# Patient Record
Sex: MALE | Race: WHITE | Employment: FULL TIME | URBAN - METROPOLITAN AREA
[De-identification: names, ages, dates, MRNs, and addresses within clinical notes are randomized per-mention and may not be internally consistent; named-entity substitution may affect disease eponyms.]

---

## 2017-01-09 ENCOUNTER — RADIANT APPOINTMENT (OUTPATIENT)
Dept: ULTRASOUND IMAGING | Facility: CLINIC | Age: 62
End: 2017-01-09
Attending: PREVENTIVE MEDICINE
Payer: COMMERCIAL

## 2017-01-09 DIAGNOSIS — I71.9 AORTIC ANEURYSM OF UNSPECIFIED SITE, WITHOUT RUPTURE (H): ICD-10-CM

## 2017-01-09 PROCEDURE — 76775 US EXAM ABDO BACK WALL LIM: CPT

## 2017-01-10 ENCOUNTER — OFFICE VISIT (OUTPATIENT)
Dept: FAMILY MEDICINE | Facility: CLINIC | Age: 62
End: 2017-01-10
Payer: COMMERCIAL

## 2017-01-10 VITALS
SYSTOLIC BLOOD PRESSURE: 128 MMHG | BODY MASS INDEX: 34.36 KG/M2 | DIASTOLIC BLOOD PRESSURE: 82 MMHG | OXYGEN SATURATION: 97 % | HEART RATE: 68 BPM | WEIGHT: 240 LBS | HEIGHT: 70 IN

## 2017-01-10 DIAGNOSIS — I10 BENIGN ESSENTIAL HYPERTENSION: Primary | ICD-10-CM

## 2017-01-10 DIAGNOSIS — M25.552 HIP PAIN, LEFT: ICD-10-CM

## 2017-01-10 DIAGNOSIS — N52.8 OTHER MALE ERECTILE DYSFUNCTION: ICD-10-CM

## 2017-01-10 DIAGNOSIS — Z87.898 H/O MOTION SICKNESS: ICD-10-CM

## 2017-01-10 PROCEDURE — 99213 OFFICE O/P EST LOW 20 MIN: CPT | Performed by: PHYSICIAN ASSISTANT

## 2017-01-10 RX ORDER — SCOLOPAMINE TRANSDERMAL SYSTEM 1 MG/1
PATCH, EXTENDED RELEASE TRANSDERMAL
Qty: 10 PATCH | Refills: 1 | Status: SHIPPED
Start: 2017-01-10 | End: 2017-03-28

## 2017-01-10 RX ORDER — AMLODIPINE AND BENAZEPRIL HYDROCHLORIDE 10; 40 MG/1; MG/1
1 CAPSULE ORAL DAILY
Qty: 90 CAPSULE | Refills: 1 | Status: SHIPPED | OUTPATIENT
Start: 2017-01-10 | End: 2017-06-09

## 2017-01-10 RX ORDER — TADALAFIL 20 MG/1
10-20 TABLET ORAL DAILY PRN
Qty: 6 TABLET | Refills: 11 | Status: SHIPPED | OUTPATIENT
Start: 2017-01-10 | End: 2017-03-28

## 2017-01-10 RX ORDER — TRAMADOL HYDROCHLORIDE 50 MG/1
50-100 TABLET ORAL EVERY 6 HOURS PRN
Qty: 30 TABLET | Refills: 0 | Status: ON HOLD | OUTPATIENT
Start: 2017-01-10 | End: 2017-04-19

## 2017-01-10 NOTE — MR AVS SNAPSHOT
After Visit Summary   1/10/2017    Santos Rajan    MRN: [id number]           Patient Information     Date Of Birth          1955        Visit Information        Provider Department      1/10/2017 8:00 AM Roly Tomas PA-C [facility]        Today's Diagnoses     Benign essential hypertension    -  1     Other male erectile dysfunction         Hip pain, left         H/O motion sickness            Follow-ups after your visit        Your next 10 appointments already scheduled     Jan 13, 2017  3:15 PM   XR LUMBAR EPIDURAL INJECTION with MGXR3, MG NEURO RAD   [facility] ([facility]    [address] [address] [place] [id number] [phone]           For nerve root injection, please send or bring copies of any MRIs or other scans you have had.  Bring a list of your current medicines to your exam. (Include vitamins, minerals and over-the-counter medicines.) Leave your valuables at home.  Plan to have someone drive you home afterward.  Stop taking the following medicines (but talk to your doctor first):   If you take blood thinners, you may need to stop taking them a few days before treatment. Talk to your doctor before stopping these medicines.Stop taking Coumadin (warfarin) 3 days before treatment. Restart the day after treatment.   If you take Plavix, Ticlid, Pletal or Persantine, please ask your doctor if you should stop these medicines. You may need extra tests on the morning of your scan. You may take your other medicines as normal.  Stop all food and drink (including water) 3 hours before your test or treatment.  Please tell the doctor:   If you are allergic to X-ray dye (contrast fluid).   If you may be pregnant.  Injections take about 30 to 45 minutes. Most people spend up to 2 hours in the clinic or hospital.  Please call the Imaging Department at your exam site with any questions              Who to contact     Normal or  "non-critical lab and imaging results will be communicated to you by MyChart, letter or phone within 4 business days after the clinic has received the results. If you do not hear from us within 7 days, please contact the clinic through [employer] or phone. If you have a critical or abnormal lab result, we will notify you by phone as soon as possible.  Submit refill requests through [employer] or call your pharmacy and they will forward the refill request to us. Please allow 3 business days for your refill to be completed.          If you need to speak with a  for additional information , please call: [phone]             Additional Information About Your Visit        [employer] Information     [employer] gives you secure access to your electronic health record. If you see a primary care provider, you can also send messages to your care team and make appointments. If you have questions, please call your primary care clinic.  If you do not have a primary care provider, please call [phone] and they will assist you.        Care EveryWhere ID     This is your Care EveryWhere ID. This could be used by other organizations to access your [facility] medical records  [id number]        Your Vitals Were     Pulse Height BMI (Body Mass Index) Pulse Oximetry          68 5' 10\" (1.778 m) 34.44 kg/m2 97%         Blood Pressure from Last 3 Encounters:   01/10/17 128/82   12/30/16 160/94   12/22/16 155/91    Weight from Last 3 Encounters:   01/10/17 240 lb (108.863 kg)   12/19/16 240 lb 3.2 oz (108.954 kg)   11/15/16 235 lb 11.2 oz (106.913 kg)              Today, you had the following     No orders found for display         Today's Medication Changes          These changes are accurate as of: 1/10/17  9:43 AM.  If you have any questions, ask your nurse or doctor.               Start taking these medicines.        Dose/Directions    scopolamine 72 hr patch   Commonly known as:  TRANSDERM   Used for:  H/O motion sickness "   Started by:  Roly Tomas PA-C        Place 1 patch onto the skin every 72 hours.  Apply to hairless area behind one ear at least 4 hours before travel.  Remove old patch and change every 3 days.   Quantity:  10 patch   Refills:  1       tadalafil 20 MG tablet   Commonly known as:  CIALIS   Used for:  Other male erectile dysfunction   Started by:  Roly Tomas PA-C        Dose:  10-20 mg   Take 0.5-1 tablets (10-20 mg) by mouth daily as needed for erectile dysfunction Never use with nitroglycerin, terazosin or doxazosin.   Quantity:  6 tablet   Refills:  11       traMADol 50 MG tablet   Commonly known as:  ULTRAM   Used for:  Hip pain, left   Started by:  Roly Tomas PA-C        Dose:   mg   Take 1-2 tablets ( mg) by mouth every 6 hours as needed for pain   Quantity:  30 tablet   Refills:  0         These medicines have changed or have updated prescriptions.        Dose/Directions    tiZANidine 4 MG tablet   Commonly known as:  ZANAFLEX   This may have changed:  Another medication with the same name was removed. Continue taking this medication, and follow the directions you see here.   Used for:  Bursitis of left hip, Lumbar herniated disc   Changed by:  Nico Alva MD        Dose:  4-8 mg   Take 1-2 tablets (4-8 mg) by mouth nightly as needed for muscle spasms   Quantity:  30 tablet   Refills:  1         Stop taking these medicines if you haven't already. Please contact your care team if you have questions.     ADVIL PO   Stopped by:  Roly Tomas PA-C           sildenafil 20 MG tablet   Commonly known as:  REVATIO/VIAGRA   Stopped by:  Roly Tomas PA-C                Where to get your medicines      These medications were sent to [facility] - [place], [place] - [address]  AT [facility] [address]  [address] , [facility] [address]     Phone:  [phone]    - amLODIPine-benazepril 10-40 MG per capsule  - scopolamine 72 hr  patch  - tadalafil 20 MG tablet      Some of these will need a paper prescription and others can be bought over the counter.  Ask your nurse if you have questions.     Bring a paper prescription for each of these medications    - traMADol 50 MG tablet             Primary Care Provider Office Phone # Fax #    Roly Tomas PA-C [phone]       [facility] [address]  [facility] [address]        Thank you!     Thank you for choosing [facility]  for your care. Our goal is always to provide you with excellent care. Hearing back from our patients is one way we can continue to improve our services. Please take a few minutes to complete the written survey that you may receive in the mail after your visit with us. Thank you!             Your Updated Medication List - Protect others around you: Learn how to safely use, store and throw away your medicines at www.disposemymeds.org.          This list is accurate as of: 1/10/17  9:43 AM.  Always use your most recent med list.                   Brand Name Dispense Instructions for use    amLODIPine-benazepril 10-40 MG per capsule    LOTREL    90 capsule    Take 1 capsule by mouth daily       naproxen 500 MG tablet    NAPROSYN    40 tablet    Take 1 tablet (500 mg) by mouth 2 times daily (with meals)       nicotine 21 MG/24HR 24 hr patch    NICODERM CQ    15 patch    Place 1 patch onto the skin every 24 hours       scopolamine 72 hr patch    TRANSDERM    10 patch    Place 1 patch onto the skin every 72 hours.  Apply to hairless area behind one ear at least 4 hours before travel.  Remove old patch and change every 3 days.       tadalafil 20 MG tablet    CIALIS    6 tablet    Take 0.5-1 tablets (10-20 mg) by mouth daily as needed for erectile dysfunction Never use with nitroglycerin, terazosin or doxazosin.       tiZANidine 4 MG tablet    ZANAFLEX    30 tablet    Take 1-2 tablets (4-8 mg) by mouth nightly as needed for muscle spasms        traMADol 50 MG tablet    ULTRAM    30 tablet    Take 1-2 tablets ( mg) by mouth every 6 hours as needed for pain

## 2017-01-10 NOTE — PROGRESS NOTES
SUBJECTIVE:                                                    Santos Rajan is a 61 year old male who presents to clinic today for the following health issues:      Hypertension Follow-up      Outpatient blood pressures are not being checked.    Low Salt Diet: not monitoring salt     Erectile dysfunction-tried viagra without success. Would like to discuss other medication options      Amount of exercise or physical activity: goes for 1 mile walks daily    Problems taking medications regularly: No    Medication side effects: none    Diet: regular (no restrictions)        Problem list and histories reviewed & adjusted, as indicated.  Additional history: as documented    Problem list, Medication list, Allergies, and Medical/Social/Surgical histories reviewed in [facility] and updated as appropriate.    Patient Active Problem List   Diagnosis     CARDIOVASCULAR SCREENING; LDL GOAL LESS THAN 160     Impaired fasting glucose     Low testosterone     Fatty liver     Umbilical hernia     Colon polyps     Advanced directives, counseling/discussion     Benign essential hypertension     Diverticulosis of large intestine without hemorrhage     Social History   Substance Use Topics     Smoking status: Current Some Day Smoker -- 0.50 packs/day for 40 years     Start date: 03/01/1973     Last Attempt to Quit: 08/01/2015     Smokeless tobacco: Never Used      Comment: Quiting as of 7/1/15      Alcohol Use: 0.0 oz/week     0 Standard drinks or equivalent per week      Comment: minimal     All other systems negative except as outline above  OBJECTIVE:    Eye exam - right eye normal lid, conjunctiva, cornea, pupil and fundus, left eye normal lid, conjunctiva, cornea, pupil and fundus.  Thyroid not palpable, not enlarged, no nodules detected.  CHEST:chest clear to IPPA, no tachypnea, retractions or cyanosis and S1, S2 normal, no murmur, no gallop, rate regular.  Pulses normal.    Santos was seen today for hypertension and erectile  dysfunction.    Diagnoses and all orders for this visit:    Benign essential hypertension  -     amLODIPine-benazepril (LOTREL) 10-40 MG per capsule; Take 1 capsule by mouth daily      work on lifestyle modification  Recheck in 6mos

## 2017-01-13 ENCOUNTER — RADIANT APPOINTMENT (OUTPATIENT)
Dept: GENERAL RADIOLOGY | Facility: CLINIC | Age: 62
End: 2017-01-13
Attending: PREVENTIVE MEDICINE
Payer: COMMERCIAL

## 2017-01-13 VITALS — DIASTOLIC BLOOD PRESSURE: 86 MMHG | OXYGEN SATURATION: 99 % | HEART RATE: 70 BPM | SYSTOLIC BLOOD PRESSURE: 159 MMHG

## 2017-01-13 DIAGNOSIS — M51.16 LUMBAR DISC HERNIATION WITH RADICULOPATHY: ICD-10-CM

## 2017-01-13 DIAGNOSIS — M51.369 LUMBAR DEGENERATIVE DISC DISEASE: ICD-10-CM

## 2017-01-13 PROCEDURE — 62323 NJX INTERLAMINAR LMBR/SAC: CPT | Performed by: RADIOLOGY

## 2017-01-13 RX ORDER — LIDOCAINE HYDROCHLORIDE 10 MG/ML
5 INJECTION, SOLUTION EPIDURAL; INFILTRATION; INTRACAUDAL; PERINEURAL ONCE
Status: COMPLETED | OUTPATIENT
Start: 2017-01-13 | End: 2017-01-13

## 2017-01-13 RX ORDER — BUPIVACAINE HYDROCHLORIDE 5 MG/ML
10 INJECTION, SOLUTION PERINEURAL ONCE
Status: COMPLETED | OUTPATIENT
Start: 2017-01-13 | End: 2017-01-13

## 2017-01-13 RX ORDER — IOPAMIDOL 408 MG/ML
10 INJECTION, SOLUTION INTRATHECAL ONCE
Status: COMPLETED | OUTPATIENT
Start: 2017-01-13 | End: 2017-01-13

## 2017-01-13 RX ORDER — METHYLPREDNISOLONE ACETATE 80 MG/ML
80 INJECTION, SUSPENSION INTRA-ARTICULAR; INTRALESIONAL; INTRAMUSCULAR; SOFT TISSUE ONCE
Status: COMPLETED | OUTPATIENT
Start: 2017-01-13 | End: 2017-01-13

## 2017-01-13 RX ADMIN — LIDOCAINE HYDROCHLORIDE 50 MG: 10 INJECTION, SOLUTION EPIDURAL; INFILTRATION; INTRACAUDAL; PERINEURAL at 15:53

## 2017-01-13 RX ADMIN — METHYLPREDNISOLONE ACETATE 80 MG: 80 INJECTION, SUSPENSION INTRA-ARTICULAR; INTRALESIONAL; INTRAMUSCULAR; SOFT TISSUE at 15:54

## 2017-01-13 RX ADMIN — BUPIVACAINE HYDROCHLORIDE 10 ML: 5 INJECTION, SOLUTION PERINEURAL at 15:51

## 2017-01-13 RX ADMIN — IOPAMIDOL 2 ML: 408 INJECTION, SOLUTION INTRATHECAL at 15:53

## 2017-01-13 NOTE — PROGRESS NOTES
: Santos was seen in X-ray today for a lumbar epidural injection. Patient rated pain before procedure 5/10. After procedure patient rated pain 4/10. This pain level is acceptable to patient. Patient discharged home with his .

## 2017-01-23 ENCOUNTER — MYC MEDICAL ADVICE (OUTPATIENT)
Dept: ORTHOPEDICS | Facility: CLINIC | Age: 62
End: 2017-01-23

## 2017-01-24 NOTE — TELEPHONE ENCOUNTER
Spoke with Dr. Alva on 1/23/17 and he is OK for left hip MRI. Orders placed and signed and patient was contacted that he may schedule at earliest convenience.

## 2017-01-26 ENCOUNTER — RADIANT APPOINTMENT (OUTPATIENT)
Dept: MRI IMAGING | Facility: CLINIC | Age: 62
End: 2017-01-26
Attending: PREVENTIVE MEDICINE
Payer: COMMERCIAL

## 2017-01-26 DIAGNOSIS — M25.552 LEFT HIP PAIN: ICD-10-CM

## 2017-01-26 PROCEDURE — 73721 MRI JNT OF LWR EXTRE W/O DYE: CPT | Mod: TC

## 2017-01-30 ENCOUNTER — OFFICE VISIT (OUTPATIENT)
Dept: ORTHOPEDICS | Facility: CLINIC | Age: 62
End: 2017-01-30
Payer: COMMERCIAL

## 2017-01-30 VITALS — HEART RATE: 71 BPM | DIASTOLIC BLOOD PRESSURE: 79 MMHG | SYSTOLIC BLOOD PRESSURE: 127 MMHG

## 2017-01-30 DIAGNOSIS — M51.16 LUMBAR DISC HERNIATION WITH RADICULOPATHY: ICD-10-CM

## 2017-01-30 DIAGNOSIS — M51.9 LUMBAR DISC DISORDER: Primary | ICD-10-CM

## 2017-01-30 PROCEDURE — 99213 OFFICE O/P EST LOW 20 MIN: CPT | Performed by: PREVENTIVE MEDICINE

## 2017-01-30 RX ORDER — NAPROXEN 500 MG/1
500 TABLET ORAL 2 TIMES DAILY WITH MEALS
Qty: 40 TABLET | Refills: 1 | Status: ON HOLD | OUTPATIENT
Start: 2017-01-30 | End: 2017-03-21

## 2017-01-30 RX ORDER — GABAPENTIN 100 MG/1
100 CAPSULE ORAL 3 TIMES DAILY
Qty: 60 CAPSULE | Refills: 0 | Status: SHIPPED | OUTPATIENT
Start: 2017-01-30 | End: 2017-02-23

## 2017-01-30 ASSESSMENT — PAIN SCALES - GENERAL: PAINLEVEL: SEVERE PAIN (6)

## 2017-01-30 NOTE — PATIENT INSTRUCTIONS
Thanks for coming today.  [facility] Medicine Clinic  [address] [place] [address]    To schedule future appointments in Ortho Clinic, you may call [phone].    To schedule ordered imaging by your Provider: Call [facility] at [phone]    [email or web address] available online at:   [email or web address]    Please call if any further questions or concerns [phone] and ask for the Orthopedic Department. Clinic hours 8 am to 5 pm.    Return to clinic if symptoms worsen.

## 2017-01-30 NOTE — PROGRESS NOTES
HISTORY OF PRESENT ILLNESS  Santos returns for left low back and hip pain. He would like to go over his left hip MRI  He continues to have pain and uses advil and tizanadine for this. He had an SI joint and left ANAY about 2 weeks ago injected, he has less pain in his low back but still gets pain when he walks a lot that goes into his hip and left leg  Location: left hip and low back  Quality:  sharp    Severity:   8  Of 10 at worst and   1  of 10 at best    Duration: back: years, hip: months  Timing: with activity and rolling over in bed  Context: occurs while walking and standing a lot  Associated symptoms: chronic low back pain and radicular symptoms into his bilateral thighs  Additional history: as documented  Problem list, Medication list, Allergies, and Medical/Social/Surgical histories reviewed in [facility] and updated as appropriate.     REVIEW OF SYSTEMS 1/30  Constitutional: negative for fever, chills, change in weight  Skin: negative for worrisome rashes, moles or lesions  Eyes: negative for vision changes or irritation  GI: negative for nausea, abdominal pain, heartburn, or change in bowel habits   MSK: negative for other joint problems currently, refer to [facility]     PHYSICAL EXAM  Vital Signs: /79 mmHg  Pulse 71 Patient declined being weighed. There is no weight on file to calculate BMI.    General  - normal appearance, in no obvious distress  CV  - normal femoral pulse  Pulm  - normal respiratory pattern, non-labored  Musculoskeletal - left hip and lumbar spine  - stance: gait today shows no limp, no obvious leg length discrepancy, normal heel and toe walk  - inspection: no swelling or effusion,  normal bone and joint alignment, no obvious deformity  - palpation: no lateral or anterior hip tenderness, left lateral hip TTP over bursa  - ROM:  Today, NOT hip limited flexion and internal rotation secondary to pain, no pain with passive and active ROM   - strength: 5/5 in all planes  - special tests:  (-)  HANNAH  (+) FADIR on left hip- worse today  no pain with axial femoral load  Neuro  - no sensory or motor deficit, grossly normal coordination, normal muscle tone  Skin  - no ecchymosis, erythema, warmth, or induration, no obvious rash  Psych  - interactive, appropriate, normal mood and affect  Lumbar spine: has NO pain with full extension of back, negative SLR, this pain is located over his left SI joint  Overall improved     ASSESSMENT    Mr. Rajan is a 61 year old year old male who is in the office today for : left hip pain due to lumbar radicular pain, lumbar DDD, improved, not resolved      PLAN  I ordered PT for hip  Start gabapentin as well - night time with slowly adding day time dose.    Santos had all of their questions answered and understand to refrain from activities that are not tolerable    Consider A nerve root specific lumbar injection in the future as well   He should follow up sooner if the condition worsens or if other problems arise.  It was a pleasure taking care of Santos.    Dr. Nico Alva

## 2017-01-30 NOTE — NURSING NOTE
"Santos Rajan's goals for this visit include:Follow up after left hip MRI.   He requests these members of his care team be copied on today's visit information: no    PCP: Roly Tomas    Referring Provider:  Referred Self, MD  No address on file    Chief Complaint   Patient presents with     RECHECK     Follow up for left hip following MRI. No change in symptoms.        Initial /79 mmHg  Pulse 71 Estimated body mass index is 34.44 kg/(m^2) as calculated from the following:    Height as of 1/10/17: 1.778 m (5' 10\").    Weight as of 1/10/17: 108.863 kg (240 lb).  BP completed using cuff size: regular    "

## 2017-02-01 ENCOUNTER — THERAPY VISIT (OUTPATIENT)
Dept: PHYSICAL THERAPY | Facility: CLINIC | Age: 62
End: 2017-02-01
Payer: COMMERCIAL

## 2017-02-01 DIAGNOSIS — M54.42 CHRONIC BILATERAL LOW BACK PAIN WITH LEFT-SIDED SCIATICA: Primary | ICD-10-CM

## 2017-02-01 DIAGNOSIS — G89.29 CHRONIC BILATERAL LOW BACK PAIN WITH LEFT-SIDED SCIATICA: Primary | ICD-10-CM

## 2017-02-01 DIAGNOSIS — M25.552 HIP PAIN, LEFT: ICD-10-CM

## 2017-02-01 PROCEDURE — 97161 PT EVAL LOW COMPLEX 20 MIN: CPT | Mod: GP | Performed by: PHYSICAL THERAPIST

## 2017-02-01 PROCEDURE — 97110 THERAPEUTIC EXERCISES: CPT | Mod: GP | Performed by: PHYSICAL THERAPIST

## 2017-02-01 NOTE — Clinical Note
[facility] ATHLETIC [facility] PHYSICAL [facility]  [address]  [facility] [id number] [phone]    2017    Re: Santos Rajan   :   1955  MRN:  [id number]   REFERRING PHYSICIAN:   Nico Alva    [facility]    Date of Initial Evaluation:  ***  Visits:  Rxs Used: 1  Reason for Referral:     Chronic bilateral low back pain with left-sided sciatica  Hip pain, left    EVALUATION SUMMARY    [facility] Initial Evaluation      Subjective:    Santos Rajan is a 61 year old male with a lumbar condition.  Condition occurred with:  Insidious onset (slowly over time getting more and more hip pain and difficulty walking).  Condition occurred: for unknown reasons.  This is a chronic condition  Back pain on and off for a while and now pain is progressing into his left leg 17 Date of MD order. .    Patient reports pain:  Lower lumbar spine.  Radiates to:  Gluteals left, thigh left and lower leg left.  Pain is described as burning and sharp and is intermittent and reported as 8/10.  Associated symptoms:  Tingling, loss of strength and loss of motion/stiffness ( tingling bilateral thighs). Pain is the same all the time.  Exacerbated by: walking about 100 yards or more, standing 40-45 min. Relieved by: sitting or lying down.   Since onset symptoms are gradually worsening.  Special tests:  X-ray and MRI.  Previous treatment: 1 injection in low back  no help, injection in left hip bursa no relief, Left hip joint  injection slight relief but then no long term relief, SI injection no relief, epidural in low back again with no relief.     General health as reported by patient is fair.  Pertinent medical history includes:  History of fractures, overweight, high blood pressure and smoking.    Other surgeries include:  Orthopedic surgery (Broken Right femur, jaw).  Current medications:  Pain medication,  muscle relaxants and high blood pressure medication.  Current occupation is Contractor     .  Patient is working in normal job without restrictions.  Primary job tasks include:  Repetitive tasks and prolonged sitting.    Barriers include:  None as reported by the patient.    Red flags:  None as reported by the patient.                      Objective:    Standing Alignment:        Lumbar:  Lordosis decr            Gait:    Gait Type:  Normal   Assistive Devices:  None                 Lumbar/SI Evaluation    Lumbar Myotomes:    T12-L3 (Hip Flex):  Left: 4+    Right: 5  L2-4 (Quads):  Left:  5    Right:  5  L4 (Ankle DF):  Left:  5    Right:  5  L5 (Great Toe Ext): Left: 5    Right: 5   S1 (Toe Raise):  Left: 5    Right: 5      Lumbar Dermtomes:  Lumbar dermatomes: L3 hyposensation from scaring from femur fracture.        L3 Left:  Normal-light touch     L3 Right:  Normal-light touch  L4 Left:  Normal-light touch       L4 Right:  Normal-light touch  L5 Left:  Normal-light touch     L5 Right:  Normal-light touch  S1 Left:  Normal-light touch     S1 Right:  Normal-light touch  Neural Tension/Mobility:    Left side:  Slump positive.  Left side:SLR  negative.     Right side:   Slump or SLR  negative.   Lumbar Palpation:  normal        Lumbar Provocation:      Left negative with:  Mobility and PROM hip    Right negative with:  Mobility and PROM hip  Spinal Segmental Conclusions:     Level: Hypo noted at L1, L2, L3, L5 and L4                                        Hip Evaluation  HIP AROM:  AROM:   Left Hip:     Normal (slight end range abduction pain. )    Right Hip:                      Hip Strength:  : Left hip abduction 3+/5, ER 5/5, extension 4/5, flexion 4+/5.                          Hip Special Testing:      Left hip negative for the following special tests:  César or Fadir/Labrum      Hip Palpation:  Normal                      Cherry Lumbar Evaluation        Test Movements:  FIS: During: no effect   After: no effect    Repeat FIS: During: no effect  After: no effect    EIS: During: no effect  After: no effect    Repeat EIS: During: increases  After: no effect              Conclusion lumbar: no directional preference indentified, subjective consistent with stenosis.                                          ROS    Assessment/Plan:      Patient is a 61 year old male with lumbar and left side hip complaints.    Patient has the following significant findings with corresponding treatment plan.                Diagnosis 1:  Left low back pain with sciatica / hip pain  Pain -  hot/cold therapy, manual therapy, self management, education, directional preference exercise and home program  Decreased ROM/flexibility - manual therapy, therapeutic exercise, therapeutic activity and home program  Decreased joint mobility - manual therapy and therapeutic exercise  Decreased strength - therapeutic exercise, therapeutic activities and home program  Decreased function - therapeutic activities and home program  Impaired posture - neuro re-education, therapeutic activities and home program    Therapy Evaluation Codes:   1) History comprised of:   Personal factors that impact the plan of care:      Past/current experiences.    Comorbidity factors that impact the plan of care are:      None.     Medications impacting care: None.  2) Examination of Body Systems comprised of:   Body structures and functions that impact the plan of care:      Hip and Lumbar spine.   Activity limitations that impact the plan of care are:      Standing and Walking.  3) Clinical presentation characteristics are:   Stable/Uncomplicated.  4) Decision-Making    Moderate complexity using standardized patient assessment instrument and/or measureable assessment of functional outcome.  Cumulative Therapy Evaluation is: Low complexity.    Previous and current functional limitations:  (See Goal Flow Sheet for this information)    Short term and Long term goals: (See  Goal Flow Sheet for this information)     Communication ability:  Patient appears to be able to clearly communicate and understand verbal and written communication and follow directions correctly.  Treatment Explanation - The following has been discussed with the patient:   RX ordered/plan of care  Anticipated outcomes  Possible risks and side effects  This patient would benefit from PT intervention to resume normal activities.   Rehab potential is good.    Frequency:  1 X week, once daily  Duration:  for 8 weeks  Discharge Plan:  Achieve all LTG.  Independent in home treatment program.  Reach maximal therapeutic benefit.      Thank you for your referral.    INQUIRIES  Therapist:    [facility]  [address] [place] [address] [address]  [facility] [address]  Phone: [phone]  Fax: [phone]

## 2017-02-01 NOTE — PROGRESS NOTES
[facility] Initial Evaluation      Subjective:    Santos Rajan is a 61 year old male with a lumbar condition.  Condition occurred with:  Insidious onset (slowly over time getting more and more hip pain and difficulty walking).  Condition occurred: for unknown reasons.  This is a chronic condition  Back pain on and off for a while and now pain is progressing into his left leg 1/30/17 Date of MD order. .    Patient reports pain:  Lower lumbar spine.  Radiates to:  Gluteals left, thigh left and lower leg left.  Pain is described as burning and sharp and is intermittent and reported as 8/10.  Associated symptoms:  Tingling, loss of strength and loss of motion/stiffness ( tingling bilateral thighs). Pain is the same all the time.  Exacerbated by: walking about 100 yards or more, standing 40-45 min. Relieved by: sitting or lying down.   Since onset symptoms are gradually worsening.  Special tests:  X-ray and MRI.  Previous treatment: 1 injection in low back  no help, injection in left hip bursa no relief, Left hip joint  injection slight relief but then no long term relief, SI injection no relief, epidural in low back again with no relief.     General health as reported by patient is fair.  Pertinent medical history includes:  History of fractures, overweight, high blood pressure and smoking.    Other surgeries include:  Orthopedic surgery (Broken Right femur, jaw).  Current medications:  Pain medication, muscle relaxants and high blood pressure medication.  Current occupation is Contractor     .  Patient is working in normal job without restrictions.  Primary job tasks include:  Repetitive tasks and prolonged sitting.    Barriers include:  None as reported by the patient.    Red flags:  None as reported by the patient.                      Objective:    Standing Alignment:        Lumbar:  Lordosis decr            Gait:    Gait Type:  Normal   Assistive Devices:  None                  Lumbar/SI Evaluation    Lumbar Myotomes:    T12-L3 (Hip Flex):  Left: 4+    Right: 5  L2-4 (Quads):  Left:  5    Right:  5  L4 (Ankle DF):  Left:  5    Right:  5  L5 (Great Toe Ext): Left: 5    Right: 5   S1 (Toe Raise):  Left: 5    Right: 5      Lumbar Dermtomes:  Lumbar dermatomes: L3 hyposensation from scaring from femur fracture.        L3 Left:  Normal-light touch     L3 Right:  Normal-light touch  L4 Left:  Normal-light touch       L4 Right:  Normal-light touch  L5 Left:  Normal-light touch     L5 Right:  Normal-light touch  S1 Left:  Normal-light touch     S1 Right:  Normal-light touch  Neural Tension/Mobility:    Left side:  Slump positive.  Left side:SLR  negative.     Right side:   Slump or SLR  negative.   Lumbar Palpation:  normal        Lumbar Provocation:      Left negative with:  Mobility and PROM hip    Right negative with:  Mobility and PROM hip  Spinal Segmental Conclusions:     Level: Hypo noted at L1, L2, L3, L5 and L4                                        Hip Evaluation  HIP AROM:  AROM:   Left Hip:     Normal (slight end range abduction pain. )    Right Hip:                      Hip Strength:  : Left hip abduction 3+/5, ER 5/5, extension 4/5, flexion 4+/5.                          Hip Special Testing:      Left hip negative for the following special tests:  César or Fadir/Labrum      Hip Palpation:  Normal                      Cherry Lumbar Evaluation        Test Movements:  FIS: During: no effect  After: no effect    Repeat FIS: During: no effect  After: no effect    EIS: During: no effect  After: no effect    Repeat EIS: During: increases  After: no effect              Conclusion lumbar: no directional preference indentified, subjective consistent with stenosis.                                          ROS    Assessment/Plan:      Patient is a 61 year old male with lumbar and left side hip complaints.    Patient has the following significant findings with corresponding treatment plan.                 Diagnosis 1:  Left low back pain with sciatica / hip pain  Pain -  hot/cold therapy, manual therapy, self management, education, directional preference exercise and home program  Decreased ROM/flexibility - manual therapy, therapeutic exercise, therapeutic activity and home program  Decreased joint mobility - manual therapy and therapeutic exercise  Decreased strength - therapeutic exercise, therapeutic activities and home program  Decreased function - therapeutic activities and home program  Impaired posture - neuro re-education, therapeutic activities and home program    Therapy Evaluation Codes:   1) History comprised of:   Personal factors that impact the plan of care:      Past/current experiences.    Comorbidity factors that impact the plan of care are:      None.     Medications impacting care: None.  2) Examination of Body Systems comprised of:   Body structures and functions that impact the plan of care:      Hip and Lumbar spine.   Activity limitations that impact the plan of care are:      Standing and Walking.  3) Clinical presentation characteristics are:   Stable/Uncomplicated.  4) Decision-Making    Moderate complexity using standardized patient assessment instrument and/or measureable assessment of functional outcome.  Cumulative Therapy Evaluation is: Low complexity.    Previous and current functional limitations:  (See Goal Flow Sheet for this information)    Short term and Long term goals: (See Goal Flow Sheet for this information)     Communication ability:  Patient appears to be able to clearly communicate and understand verbal and written communication and follow directions correctly.  Treatment Explanation - The following has been discussed with the patient:   RX ordered/plan of care  Anticipated outcomes  Possible risks and side effects  This patient would benefit from PT intervention to resume normal activities.   Rehab potential is good.    Frequency:  1 X week, once  daily  Duration:  for 8 weeks  Discharge Plan:  Achieve all LTG.  Independent in home treatment program.  Reach maximal therapeutic benefit.    Please refer to the daily flowsheet for treatment today, total treatment time and time spent performing 1:1 timed codes.

## 2017-02-08 ENCOUNTER — THERAPY VISIT (OUTPATIENT)
Dept: PHYSICAL THERAPY | Facility: CLINIC | Age: 62
End: 2017-02-08
Payer: COMMERCIAL

## 2017-02-08 DIAGNOSIS — M54.42 CHRONIC BILATERAL LOW BACK PAIN WITH LEFT-SIDED SCIATICA: ICD-10-CM

## 2017-02-08 DIAGNOSIS — M25.552 HIP PAIN, LEFT: Primary | ICD-10-CM

## 2017-02-08 DIAGNOSIS — G89.29 CHRONIC BILATERAL LOW BACK PAIN WITH LEFT-SIDED SCIATICA: ICD-10-CM

## 2017-02-08 PROCEDURE — 97110 THERAPEUTIC EXERCISES: CPT | Mod: GP | Performed by: PHYSICAL THERAPIST

## 2017-02-16 ENCOUNTER — OFFICE VISIT (OUTPATIENT)
Dept: ORTHOPEDICS | Facility: CLINIC | Age: 62
End: 2017-02-16
Payer: COMMERCIAL

## 2017-02-16 VITALS — DIASTOLIC BLOOD PRESSURE: 91 MMHG | HEART RATE: 64 BPM | SYSTOLIC BLOOD PRESSURE: 148 MMHG

## 2017-02-16 DIAGNOSIS — M51.9 LUMBAR DISC DISORDER: ICD-10-CM

## 2017-02-16 DIAGNOSIS — I73.9 CLAUDICATION OF BOTH LOWER EXTREMITIES (H): Primary | ICD-10-CM

## 2017-02-16 DIAGNOSIS — I73.9 PERIPHERAL ARTERIAL DISEASE (H): ICD-10-CM

## 2017-02-16 PROCEDURE — 99214 OFFICE O/P EST MOD 30 MIN: CPT | Performed by: PREVENTIVE MEDICINE

## 2017-02-16 ASSESSMENT — PAIN SCALES - GENERAL: PAINLEVEL: MILD PAIN (3)

## 2017-02-16 NOTE — PATIENT INSTRUCTIONS
Thanks for coming today.  Ortho/Sports Medicine Clinic  [address] [place] [address]    To schedule future appointments in Ortho Clinic, you may call [phone].    To schedule ordered imaging by your Provider: Call [facility] at [phone]    Cont[employer] available online at:   [email or web address]    Please call if any further questions or concerns [phone] and ask for the Orthopedic Department. Clinic hours 8 am to 5 pm.    Return to clinic if symptoms worsen.

## 2017-02-16 NOTE — MR AVS SNAPSHOT
After Visit Summary   2/16/2017    Santos Rajan    MRN: [id number]           Patient Information     Date Of Birth          1955        Visit Information        Provider Department      2/16/2017 4:40 PM Nico Alva MD [facility]        Today's Diagnoses     Claudication of both lower extremities (H)    -  1    Peripheral arterial disease (H)        Lumbar disc disorder          Care Instructions    Thanks for coming today.  Ortho/Sports Medicine Clinic  [address] [place] [address]    To schedule future appointments in Ortho Clinic, you may call [phone].    To schedule ordered imaging by your Provider: Call [facility] at [phone]    [employer] available online at:   Network/[employer]    Please call if any further questions or concerns [phone] and ask for the Orthopedic Department. Clinic hours 8 am to 5 pm.    Return to clinic if symptoms worsen.        Follow-ups after your visit        Additional Services     VASCULAR SURGERY REFERRAL       Your provider has referred you to: [facility]: [facility] [phone]   [email or web address]    Please be aware that coverage of these services is subject to the terms and limitations of your health insurance plan.  Call member services at your health plan with any benefit or coverage questions.      Please bring the following with you to your appointment:    (1) Any X-Rays, CTs or MRIs which have been performed.  Contact the facility where they were done to arrange for  prior to your scheduled appointment.    (2) List of current medications   (3) This referral request   (4) Any documents/labs given to you for this referral                  Your next 10 appointments already scheduled     Feb 27, 2017 12:00 PM CST   SHORT with Roly Tomas PA-C   [facility] [place] [facility]    [address]  [place]  [place] [address]   [phone]            Mar 08, 2017  5:20 PM CST   TINO Spine with Ever Boles PT   [facility] ([facility] [place]  )    [address]  [place] [id number] [phone]              Who to contact     If you have questions or need follow up information about today's clinic visit or your schedule please contact [facility] directly at [phone].  Normal or non-critical lab and imaging results will be communicated to you by SMCproshart, letter or phone within 4 business days after the clinic has received the results. If you do not hear from us within 7 days, please contact the clinic through SMCproshart or phone. If you have a critical or abnormal lab result, we will notify you by phone as soon as possible.  Submit refill requests through [email or web address] or call your pharmacy and they will forward the refill request to us. Please allow 3 business days for your refill to be completed.          Additional Information About Your Visit        SMCprosharBlipify Information     [email or web address] gives you secure access to your electronic health record. If you see a primary care provider, you can also send messages to your care team and make appointments. If you have questions, please call your primary care clinic.  If you do not have a primary care provider, please call [phone] and they will assist you.      [email or web address] is an electronic gateway that provides easy, online access to your medical records. With [email or web address], you can request a clinic appointment, read your test results, renew a prescription or communicate with your care team.     To access your existing account, please contact your [facility] or call [phone] for assistance.        Care EveryWhere ID     This is your Care EveryWhere ID. This could be used by other organizations to access your [facility] medical records  [id number]        Your Vitals Were     Pulse                    64            Blood Pressure from Last 3 Encounters:   02/16/17 (!) 148/91   01/30/17 127/79   01/13/17 159/86    Weight from Last 3 Encounters:   01/10/17 108.9 kg (240 lb)   12/19/16 109 kg (240 lb 3.2 oz)   11/15/16 106.9 kg (235 lb 11.2 oz)              We Performed the Following     VASCULAR SURGERY REFERRAL          Today's Medication Changes          These changes are accurate as of: 2/16/17 11:59 PM.  If you have any questions, ask your nurse or doctor.               These medicines have changed or have updated prescriptions.        Dose/Directions    * traMADol 50 MG tablet   Commonly known as:  ULTRAM   This may have changed:  Another medication with the same name was added. Make sure you understand how and when to take each.   Used for:  Hip pain, left        Dose:   mg   Take 1-2 tablets ( mg) by mouth every 6 hours as needed for pain   Quantity:  30 tablet   Refills:  0       * traMADol 50 MG tablet   Commonly known as:  ULTRAM   This may have changed:  You were already taking a medication with the same name, and this prescription was added. Make sure you understand how and when to take each.   Used for:  Peripheral arterial disease (H), Claudication of both lower extremities (H)        Dose:   mg   Take 1-2 tablets ( mg) by mouth every 6 hours as needed for pain   Quantity:  20 tablet   Refills:  0       * Notice:  This list has 2 medication(s) that are the same as other medications prescribed for you. Read the directions carefully, and ask your doctor or other care provider to review them with you.         Where to get your medicines      These medications were sent to [facility] [address] [place], [place] - [address] [address]  AT [facility] [address]  [address] [address] , [facility] [address]     Phone:  [phone]     gabapentin 100 MG capsule         Some of these will need a paper prescription and others can be bought over the counter.  Ask your  nurse if you have questions.     Bring a paper prescription for each of these medications     traMADol 50 MG tablet                Primary Care Provider Office Phone # Fax #    Roly Tomas PA-C [phone]       [facility] [place] [facility] [place] [address]  [place] [address]        Thank you!     Thank you for choosing [facility]  for your care. Our goal is always to provide you with excellent care. Hearing back from our patients is one way we can continue to improve our services. Please take a few minutes to complete the written survey that you may receive in the mail after your visit with us. Thank you!             Your Updated Medication List - Protect others around you: Learn how to safely use, store and throw away your medicines at www.disposemymeds.org.          This list is accurate as of: 2/16/17 11:59 PM.  Always use your most recent med list.                   Brand Name Dispense Instructions for use    amLODIPine-benazepril 10-40 MG per capsule    LOTREL    90 capsule    Take 1 capsule by mouth daily       gabapentin 100 MG capsule    NEURONTIN    60 capsule    Take 1 capsule (100 mg) by mouth 3 times daily       * naproxen 500 MG tablet    NAPROSYN    40 tablet    Take 1 tablet (500 mg) by mouth 2 times daily (with meals)       * naproxen 500 MG tablet    NAPROSYN    40 tablet    Take 1 tablet (500 mg) by mouth 2 times daily (with meals)       nicotine 21 MG/24HR 24 hr patch    NICODERM CQ    15 patch    Place 1 patch onto the skin every 24 hours       scopolamine 72 hr patch    TRANSDERM    10 patch    Place 1 patch onto the skin every 72 hours.  Apply to hairless area behind one ear at least 4 hours before travel.  Remove old patch and change every 3 days.       tadalafil 20 MG tablet    CIALIS    6 tablet    Take 0.5-1 tablets (10-20 mg) by mouth daily as needed for erectile dysfunction Never use with nitroglycerin, terazosin or doxazosin.       tiZANidine 4 MG tablet     ZANAFLEX    30 tablet    Take 1-2 tablets (4-8 mg) by mouth nightly as needed for muscle spasms       * traMADol 50 MG tablet    ULTRAM    30 tablet    Take 1-2 tablets ( mg) by mouth every 6 hours as needed for pain       * traMADol 50 MG tablet    ULTRAM    20 tablet    Take 1-2 tablets ( mg) by mouth every 6 hours as needed for pain       * Notice:  This list has 4 medication(s) that are the same as other medications prescribed for you. Read the directions carefully, and ask your doctor or other care provider to review them with you.

## 2017-02-16 NOTE — PROGRESS NOTES
HISTORY OF PRESENT ILLNESS  Santos returns for left low back and hip pain that radiates to his legs moreso now with more walking. He has an aortic aneurysm that we are aware of that he has not followed up with his PCP yet for.   Location: left hip and low back  Quality:  sharp    Severity:   8  Of 10 at worst and   1  of 10 at best    Duration: back: years, hip: months  Timing: with activity and rolling over in bed  Context: occurs while walking and standing a lot  Associated symptoms: chronic low back pain and radicular symptoms into his bilateral thighs  Additional history: as documented  Problem list, Medication list, Allergies, and Medical/Social/Surgical histories reviewed in [facility] and updated as appropriate.     REVIEW OF SYSTEMS 2/16  Constitutional: negative for fever, chills, change in weight  Skin: negative for worrisome rashes, moles or lesions  Eyes: negative for vision changes or irritation  GI: negative for nausea, abdominal pain, heartburn, or change in bowel habits   MSK: negative for other joint problems currently, refer to HPI     PHYSICAL EXAM  Vital Signs: BP (!) 148/91 (BP Location: Left arm, Patient Position: Chair, Cuff Size: Adult Large)  Pulse 64 Patient declined being weighed. There is no height or weight on file to calculate BMI.    General  - normal appearance, in no obvious distress  CV  - normal femoral pulse  Pulm  - normal respiratory pattern, non-labored  Musculoskeletal - left hip and lumbar spine  - stance: gait today shows no limp, no obvious leg length discrepancy, normal heel and toe walk  - inspection: no swelling or effusion,  normal bone and joint alignment, no obvious deformity  - palpation: no lateral or anterior hip tenderness, left lateral hip TTP over bursa  - ROM:  Today, NOT hip limited flexion and internal rotation secondary to pain, no pain with passive and active ROM   - strength: 5/5 in all planes  - special tests:  (-) HANNAH  (+) FADIR on left hip- worse today  no  pain with axial femoral load  Neuro  - no sensory or motor deficit, grossly normal coordination, normal muscle tone  Skin  - no ecchymosis, erythema, warmth, or induration, no obvious rash  Psych  - interactive, appropriate, normal mood and affect  Lumbar spine: has NO pain with full extension of back, negative SLR, this pain is located over his left SI joint  Overall similar to previous visit     ASSESSMENT    Mr. Rajan is a 61 year old year old male who is in the office today for : left hip pain due to lumbar radicular pain, lumbar DDD, improved, not resolved, and lower extremity claudication/PAD      PLAN    Cont. Gabapentin, tramadol PRN  LE KIRILL exercise study ordered  Sent referral for him to see his PCP    Santos had all of their questions answered and understand to refrain from activities that are not tolerable  Consider referral to vascular surgery after lower extremities ABIs   He should follow up sooner if the condition worsens or if other problems arise.  It was a pleasure taking care of Santos.    Dr. Nico Alva

## 2017-02-17 ENCOUNTER — TELEPHONE (OUTPATIENT)
Dept: ORTHOPEDICS | Facility: CLINIC | Age: 62
End: 2017-02-17

## 2017-02-17 NOTE — TELEPHONE ENCOUNTER
Message from Dr. Alva, schedule KIRILL and US for his lower extremities.  Order placed by Dr. Alva, revised per imaging guidelines.  Gave phone number to patient. Also reminded patient to follow up with primary care provider.  Pt expressed understanding of this and will call back with any other questions  Jing Dailey RN

## 2017-02-23 ENCOUNTER — TELEPHONE (OUTPATIENT)
Dept: OTHER | Facility: CLINIC | Age: 62
End: 2017-02-23

## 2017-02-23 RX ORDER — TRAMADOL HYDROCHLORIDE 50 MG/1
50-100 TABLET ORAL EVERY 6 HOURS PRN
Qty: 20 TABLET | Refills: 0 | Status: ON HOLD | OUTPATIENT
Start: 2017-02-23 | End: 2017-03-21

## 2017-02-23 RX ORDER — GABAPENTIN 100 MG/1
100 CAPSULE ORAL 3 TIMES DAILY
Qty: 60 CAPSULE | Refills: 0 | Status: SHIPPED | OUTPATIENT
Start: 2017-02-23 | End: 2017-04-17

## 2017-02-23 NOTE — TELEPHONE ENCOUNTER
Pt referred to [facility] by  for PAD with claudication.    Pt had KIRILL and bilateral LE arterial US done 2/21/17.    Pt needs to be scheduled for a consult with vascular surgery or IR.  Will route to scheduling to coordinate an appointment next available.    Ashleigh Garcia RN BSN

## 2017-02-24 ASSESSMENT — ENCOUNTER SYMPTOMS
CLAUDICATION: 1
DIZZINESS: 0
HEARTBURN: 0
BOWEL INCONTINENCE: 0
DECREASED APPETITE: 0
WEIGHT GAIN: 0
PALPITATIONS: 1
ABDOMINAL PAIN: 0
ORTHOPNEA: 0
NUMBNESS: 1
ARTHRALGIAS: 1
POLYDIPSIA: 0
CHILLS: 0
MYALGIAS: 1
BLOATING: 0
JOINT SWELLING: 0
PARALYSIS: 0
MUSCLE CRAMPS: 1
STIFFNESS: 1
NECK PAIN: 1
LIGHT-HEADEDNESS: 0
WEIGHT LOSS: 0
BACK PAIN: 1
SYNCOPE: 0
TINGLING: 1
LOSS OF CONSCIOUSNESS: 0
BLOOD IN STOOL: 0
RECTAL PAIN: 0
HEADACHES: 0
VOMITING: 0
SLEEP DISTURBANCES DUE TO BREATHING: 0
INCREASED ENERGY: 1
CONSTIPATION: 1
HALLUCINATIONS: 0
POLYPHAGIA: 0
DISTURBANCES IN COORDINATION: 0
MUSCLE WEAKNESS: 1
DIARRHEA: 0
HYPERTENSION: 1
LEG PAIN: 1
BRUISES/BLEEDS EASILY: 1
LEG SWELLING: 0
FATIGUE: 1
NAUSEA: 0
TREMORS: 0
TACHYCARDIA: 1
RECTAL BLEEDING: 0
EXERCISE INTOLERANCE: 1
HYPOTENSION: 0
ALTERED TEMPERATURE REGULATION: 0
WEAKNESS: 1
SEIZURES: 0
NIGHT SWEATS: 1
MEMORY LOSS: 0
SWOLLEN GLANDS: 0
FEVER: 0
JAUNDICE: 0
SPEECH CHANGE: 0

## 2017-02-27 ENCOUNTER — CARE COORDINATION (OUTPATIENT)
Dept: VASCULAR SURGERY | Facility: CLINIC | Age: 62
End: 2017-02-27

## 2017-02-27 ENCOUNTER — OFFICE VISIT (OUTPATIENT)
Dept: VASCULAR SURGERY | Facility: CLINIC | Age: 62
End: 2017-02-27

## 2017-02-27 VITALS — HEART RATE: 62 BPM | DIASTOLIC BLOOD PRESSURE: 100 MMHG | SYSTOLIC BLOOD PRESSURE: 164 MMHG

## 2017-02-27 DIAGNOSIS — I73.9 PVD (PERIPHERAL VASCULAR DISEASE) (H): Primary | ICD-10-CM

## 2017-02-27 RX ORDER — ROSUVASTATIN CALCIUM 10 MG/1
10 TABLET, COATED ORAL DAILY
Qty: 30 TABLET | Refills: 1 | Status: SHIPPED | OUTPATIENT
Start: 2017-02-27 | End: 2017-04-11

## 2017-02-27 ASSESSMENT — PAIN SCALES - GENERAL: PAINLEVEL: MILD PAIN (3)

## 2017-02-27 NOTE — TELEPHONE ENCOUNTER
Left message on home number for patient to call back to schedule consult appointment for PAD with Vasc Surg/IR.

## 2017-02-27 NOTE — NURSING NOTE
Vascular Rooming Note    Santos Rajan's goals for this visit include:   Chief Complaint   Patient presents with     Referral     Santos is here today for Claudication of both lower extremities      Randee Mccoy MA

## 2017-02-27 NOTE — PROGRESS NOTES
VASCULAR SURGERY CLINIC PATIENT CONSULTATION NOTE    HPI:  62 yo male smoker, 1/2 - 1 ppd for 40 years comes for evaluation of left leg severe lifestyle limiting claudication.  He can walk only 50 yards and then needs to stop and rest.  He has had physical therapy and it appears he is walking less.  Denies sores or tissue loss on the feet.  He denies any previous MI, or DM.  He has history of HTN. He is planning on a trip to [place] in 2 weeks.  He also was aware of a small aortic aneurysm that his PCP was following.    REFERRAL SOURCE:  Nico Alva.  PCP Roly Tomas    PAST MEDICAL HISTORY:  Past Medical History   Diagnosis Date     Arthritis 1995     knee and hands     Benign essential hypertension 6/14/2016     Brain tumor (benign) (H)      Being monitored, Lizbeth      Diverticula of colon      No surgery        PAST SURGICAL HISTORY:  Past Surgical History   Procedure Laterality Date     Gallbladder surgery       gall bladder removal      Dental surgery       implants      Abdomen surgery  1998     Gall bladder     Cholecystectomy  1998     Colonoscopy  2015     Eye surgery  2013     Lasik     Orthopedic surgery  1996     Broken jaw & femur       FAMILY HISTORY:  Family History   Problem Relation Age of Onset     CANCER Mother      kidney     Other Cancer Mother      kidney     Aneurysm Father      Other Cancer Brother      lieukemia     CANCER Brother      Leukemia      Sleep Apnea Brother      Coronary Artery Disease No family hx of      DIABETES No family hx of        SOCIAL HISTORY:   Social History   Substance Use Topics     Smoking status: Current Some Day Smoker     Packs/day: 0.50     Years: 40.00     Start date: 3/1/1973     Last attempt to quit: 8/1/2015     Smokeless tobacco: Never Used      Comment: Quiting as of 7/1/15      Alcohol use 0.0 oz/week     0 Standard drinks or equivalent per week      Comment: minimal       TOBACCO USE:  Yes, 1/2 - 1 ppd x 40 yrs.    FUNCTIONAL CAPACITY: Home, fully  functional.    HOME MEDICATIONS:  Prior to Admission medications    Medication Sig Start Date End Date Taking? Authorizing Provider   traMADol (ULTRAM) 50 MG tablet Take 1-2 tablets ( mg) by mouth every 6 hours as needed for pain 2/23/17   Nico Alva MD   gabapentin (NEURONTIN) 100 MG capsule Take 1 capsule (100 mg) by mouth 3 times daily 2/23/17   Nico Alva MD   naproxen (NAPROSYN) 500 MG tablet Take 1 tablet (500 mg) by mouth 2 times daily (with meals) 1/30/17   Nico Alva MD   amLODIPine-benazepril (LOTREL) 10-40 MG per capsule Take 1 capsule by mouth daily 1/10/17   Roly Tomas PA-C   tadalafil (CIALIS) 20 MG tablet Take 0.5-1 tablets (10-20 mg) by mouth daily as needed for erectile dysfunction Never use with nitroglycerin, terazosin or doxazosin. 1/10/17   Roly Tomas PA-C   scopolamine (TRANSDERM) 72 hr patch Place 1 patch onto the skin every 72 hours.  Apply to hairless area behind one ear at least 4 hours before travel.  Remove old patch and change every 3 days. 1/10/17   Roly Tomas PA-C   traMADol (ULTRAM) 50 MG tablet Take 1-2 tablets ( mg) by mouth every 6 hours as needed for pain 1/10/17   Roly Tomas PA-C   naproxen (NAPROSYN) 500 MG tablet Take 1 tablet (500 mg) by mouth 2 times daily (with meals) 12/22/16   Nico Alva MD   nicotine (NICODERM CQ) 21 MG/24HR patch 2h hr Place 1 patch onto the skin every 24 hours 11/15/16   Lisbeth Horowitz MD   tiZANidine (ZANAFLEX) 4 MG tablet Take 1-2 tablets (4-8 mg) by mouth nightly as needed for muscle spasms 9/16/16   Nico Alva MD       VITAL SIGNS:      BP: (!) 164/100 Pulse: 62              0 lbs 0 oz    PHYSICAL EXAM:  NEURO/PSYCH:  The patient is alert and oriented.  Appropriate.  Moves all extremities.   SKIN: Color is appropriate for race, warm, dry.  CARDIAC: RRR  PULMONARY:  Lungs symmetric.    GI:  Bowel sounds present.  Abdomen soft, nontender to light  palpation.  EXTREMITIES: Skin over the extremities warm, no tissue loss.  Non-palpable femoral pulses bilaterally.  Palpable upper extremity brachial arteries.    2/21/2017 KIRILL RESULTS:  RIGHT 0.81  LEFT 0.56    ASSESSMENT:  Aortoiliac occlusive disease, symptomatic, dejon 3, lifestyle-limiting.  Small aortic aneurysm    Patient Active Problem List   Diagnosis     CARDIOVASCULAR SCREENING; LDL GOAL LESS THAN 160     Impaired fasting glucose     Low testosterone     Fatty liver     Umbilical hernia     Colon polyps     Advanced directives, counseling/discussion     Benign essential hypertension     Diverticulosis of large intestine without hemorrhage     Hip pain, left     Chronic bilateral low back pain with left-sided sciatica       PLAN:  Begin statin therapy.  Long discussion with patient regarding likely aortoiliac occlusive disease and need for EVAR given small aneurysm, versus open surgery aortobifemoral bypass.  Will need to obtain CTA aorta with runoff and also cardiac evaluation to better determine risk.  I encourage him to go to [place] as planned as this is purely elective and he has no tissue loss.  I will call him after his CT scan and cardiac evaluation.    Nelly Granados MD      ANTI-PLATELET: Yes, ASA 81mg.  ANTI-COAGULANT: No  STATIN:  No, crestor started  DIABETES MEDICATIONS: No  TOBACCO CESSATION: Discussed for greater than 10 minutes.      Answers for HPI/ROS submitted by the patient on 2/24/2017   General Symptoms: Yes  Skin Symptoms: No  HENT Symptoms: No  EYE SYMPTOMS: No  HEART SYMPTOMS: Yes  LUNG SYMPTOMS: No  INTESTINAL SYMPTOMS: Yes  URINARY SYMPTOMS: No  REPRODUCTIVE SYMPTOMS: Yes  SKELETAL SYMPTOMS: Yes  BLOOD SYMPTOMS: Yes  NERVOUS SYSTEM SYMPTOMS: Yes  MENTAL HEALTH SYMPTOMS: No  Fever: No  Loss of appetite: No  Weight loss: No  Weight gain: No  Fatigue: Yes  Night sweats: Yes  Chills: No  Increased stress: No  Excessive hunger: No  Excessive thirst: No  Feeling hot or cold when  others believe the temperature is normal: No  Loss of height: No  Post-operative complications: No  Surgical site pain: No  Hallucinations: No  Change in or Loss of Energy: Yes  Hyperactivity: No  Confusion: No  Chest pain or pressure: No  Fast or irregular heartbeat: Yes  Pain in legs with walking: Yes  Swelling in feet or ankles: No  Trouble breathing while lying down: No  Fingers or Toes appear blue: No  High blood pressure: Yes  Low blood pressure: No  Fainting: No  Murmurs: No  Chest pain on exertion: No  Chest pain at rest: No  Cramping pain in leg during exercise: Yes  Pacemaker: No  Varicose veins: No  Edema or swelling: No  Fast heart beat: Yes  Wake up at night with shortness of breath: No  Heart flutters: No  Light-headedness: No  Exercise intolerance: Yes  Heart burn or indigestion: No  Nausea: No  Vomiting: No  Abdominal pain: No  Bloating: No  Constipation: Yes  Diarrhea: No  Blood in stool: No  Black stools: No  Rectal or Anal pain: No  Fecal incontinence: No  Rectal bleeding: No  Yellowing of skin or eyes: No  Vomit with blood: No  Change in stools: No  Hemorrhoids: No  Back pain: Yes  Muscle aches: Yes  Neck pain: Yes  Swollen joints: No  Joint pain: Yes  Bone pain: No  Muscle cramps: Yes  Muscle weakness: Yes  Joint stiffness: Yes  Bone fracture: No  Anemia: No  Swollen glands: No  Easy bleeding or bruising: Yes  Trouble with coordination: No  Dizziness or trouble with balance: No  Fainting or black-out spells: No  Memory loss: No  Headache: No  Seizures: No  Speech problems: No  Tingling: Yes  Tremor: No  Weakness: Yes  Difficulty walking: Yes  Paralysis: No  Numbness: Yes  Scrotal pain or swelling: No  Erectile dysfunction: No  Penile discharge: No  Genital ulcers: No  Reduced libido: Yes      Addendum:  Long discussion encouraging smoking cessation as well.  Time spent face to face ~ 45minutes+.  Nelly Granados MD

## 2017-02-27 NOTE — LETTER
2/27/2017       RE: Santos Rajan  [address]  [place] [address]     Dear Colleague,    Thank you for referring your patient, Santos Rajan, to the [facility]ULAR [facility] at [facility]. Please see a copy of my visit note below.    VASCULAR SURGERY CLINIC PATIENT CONSULTATION NOTE    HPI:  62 yo male smoker, 1/2 - 1 ppd for 40 years comes for evaluation of left leg severe lifestyle limiting claudication.  He can walk only 50 yards and then needs to stop and rest.  He has had physical therapy and it appears he is walking less.  Denies sores or tissue loss on the feet.  He denies any previous MI, or DM.  He has history of HTN. He is planning on a trip to [place] in 2 weeks.  He also was aware of a small aortic aneurysm that his PCP was following.    REFERRAL SOURCE:  Nico Alva.  PCP Roly Tomas    PAST MEDICAL HISTORY:  Past Medical History   Diagnosis Date     Arthritis 1995     knee and hands     Benign essential hypertension 6/14/2016     Brain tumor (benign) (H)      Being monitored, Lizbeth      Diverticula of colon      No surgery        PAST SURGICAL HISTORY:  Past Surgical History   Procedure Laterality Date     Gallbladder surgery       gall bladder removal      Dental surgery       implants      Abdomen surgery  1998     Gall bladder     Cholecystectomy  1998     Colonoscopy  2015     Eye surgery  2013     Lasik     Orthopedic surgery  1996     Broken jaw & femur       FAMILY HISTORY:  Family History   Problem Relation Age of Onset     CANCER Mother      kidney     Other Cancer Mother      kidney     Aneurysm Father      Other Cancer Brother      lieukemia     CANCER Brother      Leukemia      Sleep Apnea Brother      Coronary Artery Disease No family hx of      DIABETES No family hx of        SOCIAL HISTORY:   Social History   Substance Use Topics     Smoking status: Current Some Day Smoker     Packs/day: 0.50     Years: 40.00     Start date: 3/1/1973      Last attempt to quit: 8/1/2015     Smokeless tobacco: Never Used      Comment: Quiting as of 7/1/15      Alcohol use 0.0 oz/week     0 Standard drinks or equivalent per week      Comment: minimal       TOBACCO USE:  Yes, 1/2 - 1 ppd x 40 yrs.    FUNCTIONAL CAPACITY: Home, fully functional.    HOME MEDICATIONS:  Prior to Admission medications    Medication Sig Start Date End Date Taking? Authorizing Provider   traMADol (ULTRAM) 50 MG tablet Take 1-2 tablets ( mg) by mouth every 6 hours as needed for pain 2/23/17   Nico Alva MD   gabapentin (NEURONTIN) 100 MG capsule Take 1 capsule (100 mg) by mouth 3 times daily 2/23/17   Nico Alva MD   naproxen (NAPROSYN) 500 MG tablet Take 1 tablet (500 mg) by mouth 2 times daily (with meals) 1/30/17   Nico Alva MD   amLODIPine-benazepril (LOTREL) 10-40 MG per capsule Take 1 capsule by mouth daily 1/10/17   Roly Tomas PA-C   tadalafil (CIALIS) 20 MG tablet Take 0.5-1 tablets (10-20 mg) by mouth daily as needed for erectile dysfunction Never use with nitroglycerin, terazosin or doxazosin. 1/10/17   Roly Tomas PA-C   scopolamine (TRANSDERM) 72 hr patch Place 1 patch onto the skin every 72 hours.  Apply to hairless area behind one ear at least 4 hours before travel.  Remove old patch and change every 3 days. 1/10/17   Roly Tomas PA-C   traMADol (ULTRAM) 50 MG tablet Take 1-2 tablets ( mg) by mouth every 6 hours as needed for pain 1/10/17   Roly Tomas PA-C   naproxen (NAPROSYN) 500 MG tablet Take 1 tablet (500 mg) by mouth 2 times daily (with meals) 12/22/16   Nico Alva MD   nicotine (NICODERM CQ) 21 MG/24HR patch 2h hr Place 1 patch onto the skin every 24 hours 11/15/16   Lisbeth Horowitz MD   tiZANidine (ZANAFLEX) 4 MG tablet Take 1-2 tablets (4-8 mg) by mouth nightly as needed for muscle spasms 9/16/16   Nico Alva MD       VITAL SIGNS:      BP: (!) 164/100 Pulse: 62               0 lbs 0 oz    PHYSICAL EXAM:  NEURO/PSYCH:  The patient is alert and oriented.  Appropriate.  Moves all extremities.   SKIN: Color is appropriate for race, warm, dry.  CARDIAC: RRR  PULMONARY:  Lungs symmetric.    GI:  Bowel sounds present.  Abdomen soft, nontender to light palpation.  EXTREMITIES: Skin over the extremities warm, no tissue loss.  Non-palpable femoral pulses bilaterally.  Palpable upper extremity brachial arteries.    2/21/2017 KIRILL RESULTS:  RIGHT 0.81  LEFT 0.56    ASSESSMENT:  Aortoiliac occlusive disease, symptomatic, dejon 3, lifestyle-limiting.  Small aortic aneurysm    Patient Active Problem List   Diagnosis     CARDIOVASCULAR SCREENING; LDL GOAL LESS THAN 160     Impaired fasting glucose     Low testosterone     Fatty liver     Umbilical hernia     Colon polyps     Advanced directives, counseling/discussion     Benign essential hypertension     Diverticulosis of large intestine without hemorrhage     Hip pain, left     Chronic bilateral low back pain with left-sided sciatica       PLAN:  Begin statin therapy.  Long discussion with patient regarding likely aortoiliac occlusive disease and need for EVAR given small aneurysm, versus open surgery aortobifemoral bypass.  Will need to obtain CTA aorta with runoff and also cardiac evaluation to better determine risk.  I encourage him to go to [place] as planned as this is purely elective and he has no tissue loss.  I will call him after his CT scan and cardiac evaluation.    ANTI-PLATELET: Yes, ASA 81mg.  ANTI-COAGULANT: No  STATIN:  No, crestor started  DIABETES MEDICATIONS: No  TOBACCO CESSATION: Discussed for greater than 10 minutes.    Addendum:  Long discussion encouraging smoking cessation as well.  Time spent face to face ~ 45minutes+.      Nelly Granados MD

## 2017-02-27 NOTE — MR AVS SNAPSHOT
After Visit Summary   2/27/2017    Santos Rajan    MRN: [id number]           Patient Information     Date Of Birth          1955        Visit Information        Provider Department      2/27/2017 2:15 PM Nelly Granados MD [facility]        Today's Diagnoses     PVD (peripheral vascular disease) (H)    -  1       Follow-ups after your visit        Follow-up notes from your care team     Return in about 4 weeks (around 3/27/2017).      Your next 10 appointments already scheduled     Mar 21, 2017 10:30 AM CDT   Procedure 1.5 hr with U2A ROOM 16   [address] [facility] ([facility])    [address] [address]               Mar 21, 2017 12:00 PM CDT   Cath 90 Minute with UCVR3   [facility],  Heart Cath Lab ([facility])    [address] [address] [phone]            Mar 28, 2017   Procedure with Nelly Granados MD   [facility], Same Day Surgery (--)    [address] [address] [phone]              Who to contact     Please call your clinic at [phone] to:    Ask questions about your health    Make or cancel appointments    Discuss your medicines    Learn about your test results    Speak to your doctor   If you have compliments or concerns about an experience at your clinic, or if you wish to file a complaint, please contact [facility] Patient Relations at [phone] or email us at [email or web address]         Additional Information About Your Visit        MyChart Information     [facility] gives you secure access to your electronic health record. If you see a primary care provider, you can also send messages to your care team and make appointments. If you have questions, please call your primary care clinic.  If you do not have a primary care provider, please call [phone] and they  will assist you.      [facility] is an electronic gateway that provides easy, online access to your medical records. With [facility], you can request a clinic appointment, read your test results, renew a prescription or communicate with your care team.     To access your existing account, please contact your [facility] or call [phone] for assistance.        Care EveryWhere ID     This is your Care EveryWhere ID. This could be used by other organizations to access your [place] medical records  [id number]        Your Vitals Were     Pulse                   62            Blood Pressure from Last 3 Encounters:   02/27/17 (!) 164/100   02/16/17 (!) 148/91   01/30/17 127/79    Weight from Last 3 Encounters:   01/10/17 240 lb   12/19/16 240 lb 3.2 oz   11/15/16 235 lb 11.2 oz                 Today's Medication Changes          These changes are accurate as of: 2/27/17 11:59 PM.  If you have any questions, ask your nurse or doctor.               Start taking these medicines.        Dose/Directions    rosuvastatin 10 MG tablet   Commonly known as:  CRESTOR   Used for:  PVD (peripheral vascular disease) (H)   Started by:  Nelly Granados MD        Dose:  10 mg   Take 1 tablet (10 mg) by mouth daily   Quantity:  30 tablet   Refills:  1            Where to get your medicines      These medications were sent to [facility] [address] [place] [address] [address] [facility] [address] [facility] [address]  [address] [facility], [facility] [address]     Phone:  [phone]     rosuvastatin 10 MG tablet                Primary Care Provider Office Phone # Fax #    Roly Tomas PA-C [phone]       [facility] [place] [address]  [facility] [address]        Thank you!     Thank you for choosing [facility] VASCULAR CLINIC  for your care. Our goal is always to provide you with excellent care. Hearing back from our patients is one way we can continue to improve our services.  Please take a few minutes to complete the written survey that you may receive in the mail after your visit with us. Thank you!             Your Updated Medication List - Protect others around you: Learn how to safely use, store and throw away your medicines at www.disposemymeds.org.          This list is accurate as of: 2/27/17 11:59 PM.  Always use your most recent med list.                   Brand Name Dispense Instructions for use    amLODIPine-benazepril 10-40 MG per capsule    LOTREL    90 capsule    Take 1 capsule by mouth daily       gabapentin 100 MG capsule    NEURONTIN    60 capsule    Take 1 capsule (100 mg) by mouth 3 times daily       * naproxen 500 MG tablet    NAPROSYN    40 tablet    Take 1 tablet (500 mg) by mouth 2 times daily (with meals)       * naproxen 500 MG tablet    NAPROSYN    40 tablet    Take 1 tablet (500 mg) by mouth 2 times daily (with meals)       nicotine 21 MG/24HR 24 hr patch    NICODERM CQ    15 patch    Place 1 patch onto the skin every 24 hours       rosuvastatin 10 MG tablet    CRESTOR    30 tablet    Take 1 tablet (10 mg) by mouth daily       scopolamine 72 hr patch    TRANSDERM    10 patch    Place 1 patch onto the skin every 72 hours.  Apply to hairless area behind one ear at least 4 hours before travel.  Remove old patch and change every 3 days.       tadalafil 20 MG tablet    CIALIS    6 tablet    Take 0.5-1 tablets (10-20 mg) by mouth daily as needed for erectile dysfunction Never use with nitroglycerin, terazosin or doxazosin.       tiZANidine 4 MG tablet    ZANAFLEX    30 tablet    Take 1-2 tablets (4-8 mg) by mouth nightly as needed for muscle spasms       * traMADol 50 MG tablet    ULTRAM    30 tablet    Take 1-2 tablets ( mg) by mouth every 6 hours as needed for pain       * traMADol 50 MG tablet    ULTRAM    20 tablet    Take 1-2 tablets ( mg) by mouth every 6 hours as needed for pain       * Notice:  This list has 4 medication(s) that are the same as  other medications prescribed for you. Read the directions carefully, and ask your doctor or other care provider to review them with you.

## 2017-02-27 NOTE — PROGRESS NOTES
Received call from Call Center regarding Mr Rajan: requesting appt for severe claudication.  Requesting appt soon.  Appointment made with Dr Granados on Monday 2/27 with Dr Granados.  Mr Rajan called and will come to appointment on Monday.

## 2017-02-28 DIAGNOSIS — Z01.810 PRE-OPERATIVE CARDIOVASCULAR EXAMINATION: Primary | ICD-10-CM

## 2017-03-02 ENCOUNTER — HOSPITAL ENCOUNTER (OUTPATIENT)
Dept: NUCLEAR MEDICINE | Facility: CLINIC | Age: 62
Setting detail: NUCLEAR MEDICINE
Discharge: HOME | End: 2017-03-02
Attending: SURGERY | Admitting: SURGERY
Payer: COMMERCIAL

## 2017-03-02 DIAGNOSIS — Z01.810 PRE-OPERATIVE CARDIOVASCULAR EXAMINATION: ICD-10-CM

## 2017-03-02 PROCEDURE — 25000128 H RX IP 250 OP 636: Performed by: SURGERY

## 2017-03-02 PROCEDURE — 34300033 ZZH RX 343: Performed by: SURGERY

## 2017-03-02 PROCEDURE — 78452 HT MUSCLE IMAGE SPECT MULT: CPT

## 2017-03-02 PROCEDURE — A9502 TC99M TETROFOSMIN: HCPCS | Performed by: SURGERY

## 2017-03-02 PROCEDURE — 93018 CV STRESS TEST I&R ONLY: CPT | Performed by: INTERNAL MEDICINE

## 2017-03-02 PROCEDURE — 78452 HT MUSCLE IMAGE SPECT MULT: CPT | Mod: 26 | Performed by: INTERNAL MEDICINE

## 2017-03-02 PROCEDURE — 93016 CV STRESS TEST SUPVJ ONLY: CPT | Performed by: INTERNAL MEDICINE

## 2017-03-02 PROCEDURE — 93017 CV STRESS TEST TRACING ONLY: CPT

## 2017-03-02 RX ORDER — REGADENOSON 0.08 MG/ML
0.4 INJECTION, SOLUTION INTRAVENOUS ONCE
Status: COMPLETED | OUTPATIENT
Start: 2017-03-02 | End: 2017-03-02

## 2017-03-02 RX ADMIN — REGADENOSON 0.4 MG: 0.08 INJECTION, SOLUTION INTRAVENOUS at 10:52

## 2017-03-02 RX ADMIN — TETROFOSMIN 33 MCI.: 0.23 INJECTION, POWDER, LYOPHILIZED, FOR SOLUTION INTRAVENOUS at 11:05

## 2017-03-02 RX ADMIN — TETROFOSMIN 10.7 MCI.: 0.23 INJECTION, POWDER, LYOPHILIZED, FOR SOLUTION INTRAVENOUS at 09:35

## 2017-03-03 ENCOUNTER — HOSPITAL ENCOUNTER (OUTPATIENT)
Facility: CLINIC | Age: 62
End: 2017-03-03
Payer: COMMERCIAL

## 2017-03-03 DIAGNOSIS — Z01.810 PRE-OPERATIVE CARDIOVASCULAR EXAMINATION: ICD-10-CM

## 2017-03-03 DIAGNOSIS — R94.39 POSITIVE CARDIAC STRESS TEST: Primary | ICD-10-CM

## 2017-03-21 ENCOUNTER — APPOINTMENT (OUTPATIENT)
Dept: CARDIOLOGY | Facility: CLINIC | Age: 62
End: 2017-03-21
Attending: SURGERY
Payer: COMMERCIAL

## 2017-03-21 ENCOUNTER — APPOINTMENT (OUTPATIENT)
Dept: MEDSURG UNIT | Facility: CLINIC | Age: 62
End: 2017-03-21
Attending: SURGERY
Payer: COMMERCIAL

## 2017-03-21 ENCOUNTER — HOSPITAL ENCOUNTER (OUTPATIENT)
Facility: CLINIC | Age: 62
Discharge: HOME | End: 2017-03-21
Attending: SURGERY | Admitting: INTERNAL MEDICINE
Payer: COMMERCIAL

## 2017-03-21 VITALS
HEIGHT: 70 IN | RESPIRATION RATE: 18 BRPM | DIASTOLIC BLOOD PRESSURE: 79 MMHG | OXYGEN SATURATION: 93 % | HEART RATE: 77 BPM | BODY MASS INDEX: 34.07 KG/M2 | WEIGHT: 238 LBS | TEMPERATURE: 98.1 F | SYSTOLIC BLOOD PRESSURE: 124 MMHG

## 2017-03-21 DIAGNOSIS — Z98.61 STATUS POST PERCUTANEOUS TRANSLUMINAL CORONARY ANGIOPLASTY: Primary | ICD-10-CM

## 2017-03-21 DIAGNOSIS — Z98.61 STATUS POST PERCUTANEOUS TRANSLUMINAL CORONARY ANGIOPLASTY: ICD-10-CM

## 2017-03-21 DIAGNOSIS — Z01.810 PRE-OPERATIVE CARDIOVASCULAR EXAMINATION: ICD-10-CM

## 2017-03-21 DIAGNOSIS — I25.83 CORONARY ATHEROSCLEROSIS DUE TO LIPID RICH PLAQUE: ICD-10-CM

## 2017-03-21 DIAGNOSIS — R94.39 POSITIVE CARDIAC STRESS TEST: ICD-10-CM

## 2017-03-21 DIAGNOSIS — Z98.890 STATUS POST CORONARY ANGIOGRAM: ICD-10-CM

## 2017-03-21 LAB
ANION GAP SERPL CALCULATED.3IONS-SCNC: 6 MMOL/L (ref 3–14)
BUN SERPL-MCNC: 17 MG/DL (ref 7–30)
CALCIUM SERPL-MCNC: 8.8 MG/DL (ref 8.5–10.1)
CHLORIDE SERPL-SCNC: 107 MMOL/L (ref 94–109)
CO2 SERPL-SCNC: 29 MMOL/L (ref 20–32)
CREAT SERPL-MCNC: 0.91 MG/DL (ref 0.66–1.25)
ERYTHROCYTE [DISTWIDTH] IN BLOOD BY AUTOMATED COUNT: 14.5 % (ref 10–15)
GFR SERPL CREATININE-BSD FRML MDRD: 84 ML/MIN/1.7M2
GLUCOSE SERPL-MCNC: 111 MG/DL (ref 70–99)
HCT VFR BLD AUTO: 50 % (ref 40–53)
HGB BLD-MCNC: 16.2 G/DL (ref 13.3–17.7)
KCT BLD-ACNC: 265 SEC (ref 105–167)
MCH RBC QN AUTO: 31.2 PG (ref 26.5–33)
MCHC RBC AUTO-ENTMCNC: 32.4 G/DL (ref 31.5–36.5)
MCV RBC AUTO: 96 FL (ref 78–100)
PLATELET # BLD AUTO: 200 10E9/L (ref 150–450)
POTASSIUM SERPL-SCNC: 4.2 MMOL/L (ref 3.4–5.3)
RBC # BLD AUTO: 5.2 10E12/L (ref 4.4–5.9)
SODIUM SERPL-SCNC: 143 MMOL/L (ref 133–144)
WBC # BLD AUTO: 10.3 10E9/L (ref 4–11)

## 2017-03-21 PROCEDURE — 99152 MOD SED SAME PHYS/QHP 5/>YRS: CPT

## 2017-03-21 PROCEDURE — 27210893 ZZH CATH CR5

## 2017-03-21 PROCEDURE — 27211089 ZZH KIT ACIST INJECTOR CR3

## 2017-03-21 PROCEDURE — 27210787 ZZH MANIFOLD CR2

## 2017-03-21 PROCEDURE — 93010 ELECTROCARDIOGRAM REPORT: CPT | Performed by: INTERNAL MEDICINE

## 2017-03-21 PROCEDURE — 99153 MOD SED SAME PHYS/QHP EA: CPT | Performed by: INTERNAL MEDICINE

## 2017-03-21 PROCEDURE — C1894 INTRO/SHEATH, NON-LASER: HCPCS

## 2017-03-21 PROCEDURE — 93571 IV DOP VEL&/PRESS C FLO 1ST: CPT | Mod: 26 | Performed by: INTERNAL MEDICINE

## 2017-03-21 PROCEDURE — 25000128 H RX IP 250 OP 636: Performed by: SURGERY

## 2017-03-21 PROCEDURE — 25000132 ZZH RX MED GY IP 250 OP 250 PS 637: Performed by: SURGERY

## 2017-03-21 PROCEDURE — C1769 GUIDE WIRE: HCPCS

## 2017-03-21 PROCEDURE — 85347 COAGULATION TIME ACTIVATED: CPT

## 2017-03-21 PROCEDURE — 40000065 ZZH STATISTIC EKG NON-CHARGEABLE

## 2017-03-21 PROCEDURE — 25000128 H RX IP 250 OP 636: Performed by: INTERNAL MEDICINE

## 2017-03-21 PROCEDURE — 4A033BC MEASUREMENT OF ARTERIAL PRESSURE, CORONARY, PERCUTANEOUS APPROACH: ICD-10-PCS | Performed by: INTERNAL MEDICINE

## 2017-03-21 PROCEDURE — 93571 IV DOP VEL&/PRESS C FLO 1ST: CPT

## 2017-03-21 PROCEDURE — 80048 BASIC METABOLIC PNL TOTAL CA: CPT | Performed by: SURGERY

## 2017-03-21 PROCEDURE — B2111ZZ FLUOROSCOPY OF MULTIPLE CORONARY ARTERIES USING LOW OSMOLAR CONTRAST: ICD-10-PCS | Performed by: INTERNAL MEDICINE

## 2017-03-21 PROCEDURE — C1887 CATHETER, GUIDING: HCPCS

## 2017-03-21 PROCEDURE — 93454 CORONARY ARTERY ANGIO S&I: CPT

## 2017-03-21 PROCEDURE — 93454 CORONARY ARTERY ANGIO S&I: CPT | Mod: 26 | Performed by: INTERNAL MEDICINE

## 2017-03-21 PROCEDURE — 99153 MOD SED SAME PHYS/QHP EA: CPT

## 2017-03-21 PROCEDURE — 93005 ELECTROCARDIOGRAM TRACING: CPT

## 2017-03-21 PROCEDURE — 27210946 ZZH KIT HC TOTES DISP CR8

## 2017-03-21 PROCEDURE — 25000125 ZZHC RX 250: Performed by: INTERNAL MEDICINE

## 2017-03-21 PROCEDURE — 40000172 ZZH STATISTIC PROCEDURE PREP ONLY

## 2017-03-21 PROCEDURE — 85027 COMPLETE CBC AUTOMATED: CPT | Performed by: SURGERY

## 2017-03-21 PROCEDURE — 99152 MOD SED SAME PHYS/QHP 5/>YRS: CPT | Performed by: INTERNAL MEDICINE

## 2017-03-21 RX ORDER — ARGATROBAN 1 MG/ML
350 INJECTION, SOLUTION INTRAVENOUS
Status: DISCONTINUED | OUTPATIENT
Start: 2017-03-21 | End: 2017-03-21 | Stop reason: HOSPADM

## 2017-03-21 RX ORDER — SODIUM CHLORIDE 9 MG/ML
INJECTION, SOLUTION INTRAVENOUS CONTINUOUS
Status: DISCONTINUED | OUTPATIENT
Start: 2017-03-21 | End: 2017-03-21 | Stop reason: HOSPADM

## 2017-03-21 RX ORDER — NITROGLYCERIN 0.4 MG/1
0.4 TABLET SUBLINGUAL EVERY 5 MIN PRN
Status: DISCONTINUED | OUTPATIENT
Start: 2017-03-21 | End: 2017-03-21 | Stop reason: HOSPADM

## 2017-03-21 RX ORDER — EPTIFIBATIDE 2 MG/ML
180 INJECTION, SOLUTION INTRAVENOUS EVERY 10 MIN PRN
Status: DISCONTINUED | OUTPATIENT
Start: 2017-03-21 | End: 2017-03-21 | Stop reason: HOSPADM

## 2017-03-21 RX ORDER — NALOXONE HYDROCHLORIDE 0.4 MG/ML
0.4 INJECTION, SOLUTION INTRAMUSCULAR; INTRAVENOUS; SUBCUTANEOUS EVERY 5 MIN PRN
Status: DISCONTINUED | OUTPATIENT
Start: 2017-03-21 | End: 2017-03-21 | Stop reason: HOSPADM

## 2017-03-21 RX ORDER — ADENOSINE 3 MG/ML
12-12000 INJECTION, SOLUTION INTRAVENOUS
Status: DISCONTINUED | OUTPATIENT
Start: 2017-03-21 | End: 2017-03-21 | Stop reason: HOSPADM

## 2017-03-21 RX ORDER — ARGATROBAN 1 MG/ML
150 INJECTION, SOLUTION INTRAVENOUS
Status: DISCONTINUED | OUTPATIENT
Start: 2017-03-21 | End: 2017-03-21 | Stop reason: HOSPADM

## 2017-03-21 RX ORDER — DOBUTAMINE HYDROCHLORIDE 200 MG/100ML
2-20 INJECTION INTRAVENOUS CONTINUOUS PRN
Status: DISCONTINUED | OUTPATIENT
Start: 2017-03-21 | End: 2017-03-21 | Stop reason: HOSPADM

## 2017-03-21 RX ORDER — ACETAMINOPHEN 325 MG/1
325-650 TABLET ORAL EVERY 4 HOURS PRN
Status: DISCONTINUED | OUTPATIENT
Start: 2017-03-21 | End: 2017-03-21 | Stop reason: HOSPADM

## 2017-03-21 RX ORDER — PRASUGREL 10 MG/1
10-60 TABLET, FILM COATED ORAL
Status: DISCONTINUED | OUTPATIENT
Start: 2017-03-21 | End: 2017-03-21 | Stop reason: HOSPADM

## 2017-03-21 RX ORDER — PROMETHAZINE HYDROCHLORIDE 25 MG/ML
6.25-25 INJECTION, SOLUTION INTRAMUSCULAR; INTRAVENOUS EVERY 4 HOURS PRN
Status: DISCONTINUED | OUTPATIENT
Start: 2017-03-21 | End: 2017-03-21 | Stop reason: HOSPADM

## 2017-03-21 RX ORDER — PROTAMINE SULFATE 10 MG/ML
25-100 INJECTION, SOLUTION INTRAVENOUS EVERY 5 MIN PRN
Status: DISCONTINUED | OUTPATIENT
Start: 2017-03-21 | End: 2017-03-21 | Stop reason: HOSPADM

## 2017-03-21 RX ORDER — IOPAMIDOL 755 MG/ML
110 INJECTION, SOLUTION INTRAVASCULAR ONCE
Status: COMPLETED | OUTPATIENT
Start: 2017-03-21 | End: 2017-03-21

## 2017-03-21 RX ORDER — SODIUM NITROPRUSSIDE 25 MG/ML
100-200 INJECTION INTRAVENOUS
Status: DISCONTINUED | OUTPATIENT
Start: 2017-03-21 | End: 2017-03-21 | Stop reason: HOSPADM

## 2017-03-21 RX ORDER — LIDOCAINE 40 MG/G
CREAM TOPICAL
Status: DISCONTINUED | OUTPATIENT
Start: 2017-03-21 | End: 2017-03-21 | Stop reason: HOSPADM

## 2017-03-21 RX ORDER — NICARDIPINE HYDROCHLORIDE 2.5 MG/ML
100 INJECTION INTRAVENOUS
Status: DISCONTINUED | OUTPATIENT
Start: 2017-03-21 | End: 2017-03-21 | Stop reason: HOSPADM

## 2017-03-21 RX ORDER — CLOPIDOGREL BISULFATE 75 MG/1
75 TABLET ORAL
Status: DISCONTINUED | OUTPATIENT
Start: 2017-03-21 | End: 2017-03-21 | Stop reason: HOSPADM

## 2017-03-21 RX ORDER — HEPARIN SODIUM 1000 [USP'U]/ML
1000-10000 INJECTION, SOLUTION INTRAVENOUS; SUBCUTANEOUS EVERY 5 MIN PRN
Status: DISCONTINUED | OUTPATIENT
Start: 2017-03-21 | End: 2017-03-21 | Stop reason: HOSPADM

## 2017-03-21 RX ORDER — NIFEDIPINE 10 MG/1
10 CAPSULE ORAL
Status: DISCONTINUED | OUTPATIENT
Start: 2017-03-21 | End: 2017-03-21 | Stop reason: HOSPADM

## 2017-03-21 RX ORDER — LORAZEPAM 2 MG/ML
.5-2 INJECTION INTRAMUSCULAR EVERY 4 HOURS PRN
Status: DISCONTINUED | OUTPATIENT
Start: 2017-03-21 | End: 2017-03-21 | Stop reason: HOSPADM

## 2017-03-21 RX ORDER — CLOPIDOGREL BISULFATE 75 MG/1
300-600 TABLET ORAL
Status: DISCONTINUED | OUTPATIENT
Start: 2017-03-21 | End: 2017-03-21 | Stop reason: HOSPADM

## 2017-03-21 RX ORDER — FENTANYL CITRATE 50 UG/ML
25-50 INJECTION, SOLUTION INTRAMUSCULAR; INTRAVENOUS
Status: DISCONTINUED | OUTPATIENT
Start: 2017-03-21 | End: 2017-03-21 | Stop reason: HOSPADM

## 2017-03-21 RX ORDER — ASPIRIN 81 MG/1
81 TABLET ORAL DAILY
Status: DISCONTINUED | OUTPATIENT
Start: 2017-03-22 | End: 2017-03-21 | Stop reason: HOSPADM

## 2017-03-21 RX ORDER — DIPHENHYDRAMINE HYDROCHLORIDE 50 MG/ML
25-50 INJECTION INTRAMUSCULAR; INTRAVENOUS
Status: DISCONTINUED | OUTPATIENT
Start: 2017-03-21 | End: 2017-03-21 | Stop reason: HOSPADM

## 2017-03-21 RX ORDER — DEXTROSE MONOHYDRATE 25 G/50ML
12.5-5 INJECTION, SOLUTION INTRAVENOUS EVERY 30 MIN PRN
Status: DISCONTINUED | OUTPATIENT
Start: 2017-03-21 | End: 2017-03-21 | Stop reason: HOSPADM

## 2017-03-21 RX ORDER — METOPROLOL SUCCINATE 25 MG/1
25 TABLET, EXTENDED RELEASE ORAL DAILY
Qty: 30 TABLET | Refills: 3 | Status: SHIPPED
Start: 2017-03-22 | End: 2017-04-27

## 2017-03-21 RX ORDER — FLUMAZENIL 0.1 MG/ML
0.2 INJECTION, SOLUTION INTRAVENOUS
Status: DISCONTINUED | OUTPATIENT
Start: 2017-03-21 | End: 2017-03-21 | Stop reason: HOSPADM

## 2017-03-21 RX ORDER — POTASSIUM CHLORIDE 29.8 MG/ML
20 INJECTION INTRAVENOUS
Status: DISCONTINUED | OUTPATIENT
Start: 2017-03-21 | End: 2017-03-21 | Stop reason: HOSPADM

## 2017-03-21 RX ORDER — DOPAMINE HYDROCHLORIDE 160 MG/100ML
2-20 INJECTION, SOLUTION INTRAVENOUS CONTINUOUS PRN
Status: DISCONTINUED | OUTPATIENT
Start: 2017-03-21 | End: 2017-03-21 | Stop reason: HOSPADM

## 2017-03-21 RX ORDER — NALOXONE HYDROCHLORIDE 0.4 MG/ML
.2-.4 INJECTION, SOLUTION INTRAMUSCULAR; INTRAVENOUS; SUBCUTANEOUS
Status: DISCONTINUED | OUTPATIENT
Start: 2017-03-21 | End: 2017-03-21 | Stop reason: HOSPADM

## 2017-03-21 RX ORDER — NALOXONE HYDROCHLORIDE 0.4 MG/ML
.1-.4 INJECTION, SOLUTION INTRAMUSCULAR; INTRAVENOUS; SUBCUTANEOUS
Status: DISCONTINUED | OUTPATIENT
Start: 2017-03-21 | End: 2017-03-21 | Stop reason: HOSPADM

## 2017-03-21 RX ORDER — ENALAPRILAT 1.25 MG/ML
1.25-2.5 INJECTION INTRAVENOUS
Status: DISCONTINUED | OUTPATIENT
Start: 2017-03-21 | End: 2017-03-21 | Stop reason: HOSPADM

## 2017-03-21 RX ORDER — ASPIRIN 81 MG/1
81-324 TABLET, CHEWABLE ORAL
Status: DISCONTINUED | OUTPATIENT
Start: 2017-03-21 | End: 2017-03-21 | Stop reason: HOSPADM

## 2017-03-21 RX ORDER — EPTIFIBATIDE 2 MG/ML
1 INJECTION, SOLUTION INTRAVENOUS CONTINUOUS PRN
Status: DISCONTINUED | OUTPATIENT
Start: 2017-03-21 | End: 2017-03-21 | Stop reason: HOSPADM

## 2017-03-21 RX ORDER — NITROGLYCERIN 20 MG/100ML
.07-1.85 INJECTION INTRAVENOUS CONTINUOUS PRN
Status: DISCONTINUED | OUTPATIENT
Start: 2017-03-21 | End: 2017-03-21 | Stop reason: HOSPADM

## 2017-03-21 RX ORDER — FUROSEMIDE 10 MG/ML
20-100 INJECTION INTRAMUSCULAR; INTRAVENOUS
Status: DISCONTINUED | OUTPATIENT
Start: 2017-03-21 | End: 2017-03-21 | Stop reason: HOSPADM

## 2017-03-21 RX ORDER — LIDOCAINE HYDROCHLORIDE 10 MG/ML
30 INJECTION, SOLUTION EPIDURAL; INFILTRATION; INTRACAUDAL; PERINEURAL
Status: DISCONTINUED | OUTPATIENT
Start: 2017-03-21 | End: 2017-03-21 | Stop reason: HOSPADM

## 2017-03-21 RX ORDER — POTASSIUM CHLORIDE 7.45 MG/ML
10 INJECTION INTRAVENOUS
Status: DISCONTINUED | OUTPATIENT
Start: 2017-03-21 | End: 2017-03-21 | Stop reason: HOSPADM

## 2017-03-21 RX ORDER — PROTAMINE SULFATE 10 MG/ML
1-5 INJECTION, SOLUTION INTRAVENOUS
Status: DISCONTINUED | OUTPATIENT
Start: 2017-03-21 | End: 2017-03-21 | Stop reason: HOSPADM

## 2017-03-21 RX ORDER — VERAPAMIL HYDROCHLORIDE 2.5 MG/ML
1-2.5 INJECTION, SOLUTION INTRAVENOUS
Status: COMPLETED | OUTPATIENT
Start: 2017-03-21 | End: 2017-03-21

## 2017-03-21 RX ORDER — NITROGLYCERIN 5 MG/ML
100-500 VIAL (ML) INTRAVENOUS
Status: COMPLETED | OUTPATIENT
Start: 2017-03-21 | End: 2017-03-21

## 2017-03-21 RX ORDER — PHENYLEPHRINE HCL IN 0.9% NACL 1 MG/10 ML
20-100 SYRINGE (ML) INTRAVENOUS
Status: DISCONTINUED | OUTPATIENT
Start: 2017-03-21 | End: 2017-03-21 | Stop reason: HOSPADM

## 2017-03-21 RX ORDER — METHYLPREDNISOLONE SODIUM SUCCINATE 125 MG/2ML
125 INJECTION, POWDER, LYOPHILIZED, FOR SOLUTION INTRAMUSCULAR; INTRAVENOUS
Status: DISCONTINUED | OUTPATIENT
Start: 2017-03-21 | End: 2017-03-21 | Stop reason: HOSPADM

## 2017-03-21 RX ORDER — ONDANSETRON 2 MG/ML
4 INJECTION INTRAMUSCULAR; INTRAVENOUS EVERY 4 HOURS PRN
Status: DISCONTINUED | OUTPATIENT
Start: 2017-03-21 | End: 2017-03-21 | Stop reason: HOSPADM

## 2017-03-21 RX ORDER — METOPROLOL SUCCINATE 25 MG/1
25 TABLET, EXTENDED RELEASE ORAL DAILY
Status: DISCONTINUED | OUTPATIENT
Start: 2017-03-22 | End: 2017-03-21 | Stop reason: HOSPADM

## 2017-03-21 RX ORDER — ASPIRIN 325 MG
325 TABLET ORAL
Status: DISCONTINUED | OUTPATIENT
Start: 2017-03-21 | End: 2017-03-21 | Stop reason: HOSPADM

## 2017-03-21 RX ORDER — NITROGLYCERIN 5 MG/ML
100-200 VIAL (ML) INTRAVENOUS
Status: DISCONTINUED | OUTPATIENT
Start: 2017-03-21 | End: 2017-03-21 | Stop reason: HOSPADM

## 2017-03-21 RX ORDER — HYDRALAZINE HYDROCHLORIDE 20 MG/ML
10-20 INJECTION INTRAMUSCULAR; INTRAVENOUS
Status: DISCONTINUED | OUTPATIENT
Start: 2017-03-21 | End: 2017-03-21 | Stop reason: HOSPADM

## 2017-03-21 RX ADMIN — Medication 5000 UNITS: at 12:31

## 2017-03-21 RX ADMIN — NITROGLYCERIN 200 MCG: 5 INJECTION, SOLUTION INTRAVENOUS at 12:10

## 2017-03-21 RX ADMIN — MIDAZOLAM HYDROCHLORIDE 1 MG: 1 INJECTION, SOLUTION INTRAMUSCULAR; INTRAVENOUS at 12:07

## 2017-03-21 RX ADMIN — ADENOSINE 40 MCG: 3 INJECTION, SOLUTION INTRAVENOUS at 13:00

## 2017-03-21 RX ADMIN — FENTANYL CITRATE 50 MCG: 50 INJECTION, SOLUTION INTRAMUSCULAR; INTRAVENOUS at 12:07

## 2017-03-21 RX ADMIN — ADENOSINE 80 MCG: 3 INJECTION, SOLUTION INTRAVENOUS at 13:02

## 2017-03-21 RX ADMIN — ADENOSINE 80 MCG: 3 INJECTION, SOLUTION INTRAVENOUS at 13:01

## 2017-03-21 RX ADMIN — SODIUM CHLORIDE: 9 INJECTION, SOLUTION INTRAVENOUS at 11:33

## 2017-03-21 RX ADMIN — ASPIRIN 325 MG: 325 TABLET, DELAYED RELEASE ORAL at 11:32

## 2017-03-21 RX ADMIN — IOPAMIDOL 110 ML: 755 INJECTION, SOLUTION INTRAVASCULAR at 13:30

## 2017-03-21 RX ADMIN — MIDAZOLAM HYDROCHLORIDE 1 MG: 1 INJECTION, SOLUTION INTRAMUSCULAR; INTRAVENOUS at 12:17

## 2017-03-21 RX ADMIN — Medication 5000 UNITS: at 12:10

## 2017-03-21 RX ADMIN — VERAPAMIL HYDROCHLORIDE 5 MG: 2.5 INJECTION, SOLUTION INTRAVENOUS at 12:10

## 2017-03-21 RX ADMIN — FENTANYL CITRATE 50 MCG: 50 INJECTION, SOLUTION INTRAMUSCULAR; INTRAVENOUS at 12:26

## 2017-03-21 NOTE — PROGRESS NOTES
Patient alert and oriented. TR band x2 with air being deflated from #2. Patient is tolerating oral fluids. Bedrest till 1545. Wife at bedside.

## 2017-03-21 NOTE — DISCHARGE INSTRUCTIONS
Going Home after Coronary Angiogram       Name: Santos Rajan  Medical Record Number:  [id number]  Today's Date: March 21, 2017        For 24 hours:         Have an adult stay with you for 24 hours.         Relax and take it easy.         Drink plenty of fluids.         You may eat your normal diet, unless your doctor tells you otherwise.         Do NOT make any important or legal decisions.         Do NOT drive or operate machines at home or at work.         Do NOT drink alcohol.      Do NOT smoke.     Medicines:         If you are on metformin (Glucophage), do not restart it until you have blood tests (within 2 to 3 days after discharge). When your doctor tells you it is safe, you may restart the metformin.         If you have stopped any other medicines, check with your nurse or provider about when to restart them.    Care of wrist or arm site:         It is normal to have soreness at the puncture site and mild tingling in your hand for up to 3 days.           Remove the Band-Aid after 24 hours. If there is minor oozing, apply another Band-aid and remove it after 12 hours.          Do NOT take a bath, or use a hot tub or pool for the next 48 hours. You may shower.          It is normal to have a small bruise.  There should not be a lump at the site.         Do not scrub the site.         Do not use lotion or powder near the puncture site for 3 days.         For 2 days, do not use your hand or arm to support your weight (such as rising from a chair) or bend your wrist (such as lifting a garage door).         For 2 days, do not lift more than 5 pounds or exercise your arm (tennis, golf or bowling).    If you start bleeding from the site in your arm: Sit down and press firmly on the site with your fingers for 10 minutes. Call your doctor as soon as you can.    Call 911 right away if you have bleeding that is heavy or does not stop.     Call your doctor if:         You have a large or growing hard lump around the  site.         The site is red, swollen, hot or tender.         Blood or fluid is draining from the site.         You have chills or a fever greater than 101 F (38 C).         Your leg or arm turns bluish, feels numb or cool.         You have hives, a rash or unusual itching.     ADDITIONAL INSTRUCTIONS: You are scheduled for Cardiology follow-up on this Saturday, March 25th at 11:00 am at the [facility] ([address] [place] [address]). If you have any questions, please contact the Cardiology Clinic at [phone].    [facility] at [place]:   [phone] (7 days a week)      Cardiology Fellow on call (24 hours per day) at [facility]:   [phone] (ask for Cardiology Fellow on call)      Number where we can reach you:  ____________

## 2017-03-21 NOTE — IP AVS SNAPSHOT
MRN:[id number]                      After Visit Summary   3/21/2017    Santos Rajan    MRN: [id number]           Thank you!     Thank you for choosing [facility] for your care. Our goal is always to provide you with excellent care. Hearing back from our patients is one way we can continue to improve our services. Please take a few minutes to complete the written survey that you may receive in the mail after you visit with us. Thank you!        Patient Information     Date Of Birth          1955        About your hospital stay     You were admitted on:  March 21, 2017 You last received care in the:  Unit 6D Observation [facility]    You were discharged on:  March 21, 2017       Who to Call     For medical emergencies, please call 911.  For non-urgent questions about your medical care, please call your primary care provider or clinic, [phone]          Attending Provider     Provider Specialty    Nelly Granados MD Vascular Surgery    Mike Burk MD Cardiology       Primary Care Provider Office Phone # Fax #    Roly Tomas PA-C [phone]       [facility] [place] [address]  [place] [address]        After Care Instructions     Discharge Instructions - IF on Metformin (Glucophage or Glucovance) or Metformin containing medications       IF on Metformin (Glucophage or Glucovance) or Metformin containing medications , schedule a Basic Metabolic Panel at [facility] or Primary Clinic in 48 - 72 hours post procedure and PRIOR TO resuming the Metformin or Metformin containing medications.  Hold Metformin (Glucophage or Glucovance) or Metformin containing medications until after the Basic Metabolic Panel on the 2nd or 3rd day following the procedure.  May resume after blood draw is complete.                  Your next 10 appointments already scheduled     Mar 25, 2017 11:00 AM CDT   (Arrive by 10:45 AM)   New Patient Visit with MD BRENT Teran  [facility] ([facility] [place])    [address]  3rd Floor  [facility] [address] [phone]            Mar 28, 2017   Procedure with Nelly Granados MD   [facility] [place], Same Day [facility] (--)    [address]  [place] [address]   [phone]              Further instructions from your care team       Going Home after Coronary Angiogram       Name: Santos Rajan  Medical Record Number:  [id number]  Today's Date: March 21, 2017        For 24 hours:         Have an adult stay with you for 24 hours.         Relax and take it easy.         Drink plenty of fluids.         You may eat your normal diet, unless your doctor tells you otherwise.         Do NOT make any important or legal decisions.         Do NOT drive or operate machines at home or at work.         Do NOT drink alcohol.      Do NOT smoke.     Medicines:         If you are on metformin (Glucophage), do not restart it until you have blood tests (within 2 to 3 days after discharge). When your doctor tells you it is safe, you may restart the metformin.         If you have stopped any other medicines, check with your nurse or provider about when to restart them.    Care of wrist or arm site:         It is normal to have soreness at the puncture site and mild tingling in your hand for up to 3 days.           Remove the Band-Aid after 24 hours. If there is minor oozing, apply another Band-aid and remove it after 12 hours.          Do NOT take a bath, or use a hot tub or pool for the next 48 hours. You may shower.          It is normal to have a small bruise.  There should not be a lump at the site.         Do not scrub the site.         Do not use lotion or powder near the puncture site for 3 days.         For 2 days, do not use your hand or arm to support your weight (such as rising from a chair) or bend your wrist (such as lifting a garage door).         For 2 days, do not lift more than 5 pounds or exercise your  "arm (tennis, golf or bowling).    If you start bleeding from the site in your arm: Sit down and press firmly on the site with your fingers for 10 minutes. Call your doctor as soon as you can.    Call 911 right away if you have bleeding that is heavy or does not stop.     Call your doctor if:         You have a large or growing hard lump around the site.         The site is red, swollen, hot or tender.         Blood or fluid is draining from the site.         You have chills or a fever greater than 101 F (38 C).         Your leg or arm turns bluish, feels numb or cool.         You have hives, a rash or unusual itching.     ADDITIONAL INSTRUCTIONS: You are scheduled for Cardiology follow-up on this Saturday, March 25th at 11:00 am at the [facility] ([address] [place] [address]). If you have any questions, please contact the Cardiology Clinic at [phone].    [facility] [place]:   [phone] (7 days a week)      Cardiology Fellow on call (24 hours per day) at [facility]:   [phone] (ask for Cardiology Fellow on call)      Number where we can reach you:  ____________    Pending Results     Date and Time Order Name Status Description    3/21/2017 1106 EKG 12-lead, tracing only Preliminary             Admission Information     Date & Time Provider Department Dept. Phone    3/21/2017 Mike Burk MD Unit 6D Observation [facility] [phone]      Your Vitals Were     Blood Pressure Pulse Temperature Respirations Height Weight    124/79 (BP Location: Left arm) 77 98.1  F (36.7  C) (Oral) 18 1.778 m (5' 10\") 108 kg (238 lb)    Pulse Oximetry BMI (Body Mass Index)                93% 34.15 kg/m2          MyChart Information     [employer] gives you secure access to your electronic health record. If you see a primary care provider, you can also send messages to your care team and make appointments. If you have questions, please call your primary " care clinic.  If you do not have a primary care provider, please call [phone] and they will assist you.        Care EveryWhere ID     This is your Care EveryWhere ID. This could be used by other organizations to access your [facility] medical records  [id number]           Review of your medicines      START taking        Dose / Directions    aspirin 81 MG EC tablet   Used for:  Coronary atherosclerosis due to lipid rich plaque        Dose:  81 mg   Start taking on:  3/22/2017   Take 1 tablet (81 mg) by mouth daily   Quantity:  60 tablet   Refills:  3       metoprolol 25 MG 24 hr tablet   Commonly known as:  TOPROL-XL   Used for:  Coronary atherosclerosis due to lipid rich plaque        Dose:  25 mg   Start taking on:  3/22/2017   Take 1 tablet (25 mg) by mouth daily   Quantity:  30 tablet   Refills:  3         CONTINUE these medicines which have NOT CHANGED        Dose / Directions    ADVIL COLD/SINUS PO        Dose:  1 tablet   Take 1 tablet by mouth 3 times daily as needed (for cold)   Refills:  0       amLODIPine-benazepril 10-40 MG per capsule   Commonly known as:  LOTREL   Used for:  Benign essential hypertension        Dose:  1 capsule   Take 1 capsule by mouth daily   Quantity:  90 capsule   Refills:  1       gabapentin 100 MG capsule   Commonly known as:  NEURONTIN   Used for:  Lumbar disc disorder        Dose:  100 mg   Take 1 capsule (100 mg) by mouth 3 times daily   Quantity:  60 capsule   Refills:  0       IBUPROFEN PO        Dose:  400 mg   Take 400 mg by mouth every 6 hours as needed for moderate pain (Takes 400-6-800 mg as needed)   Refills:  0       naproxen 500 MG tablet   Commonly known as:  NAPROSYN   Used for:  Pain of left sacroiliac joint, Lumbar degenerative disc disease        Dose:  500 mg   Take 1 tablet (500 mg) by mouth 2 times daily (with meals)   Quantity:  40 tablet   Refills:  1       nicotine 21 MG/24HR 24 hr patch   Commonly known as:  NICODERM CQ   Used for:  Tobacco abuse         Dose:  1 patch   Place 1 patch onto the skin every 24 hours   Quantity:  15 patch   Refills:  0       rosuvastatin 10 MG tablet   Commonly known as:  CRESTOR   Used for:  PVD (peripheral vascular disease) (H)        Dose:  10 mg   Take 1 tablet (10 mg) by mouth daily   Quantity:  30 tablet   Refills:  1       scopolamine 72 hr patch   Commonly known as:  TRANSDERM   Used for:  H/O motion sickness        Place 1 patch onto the skin every 72 hours.  Apply to hairless area behind one ear at least 4 hours before travel.  Remove old patch and change every 3 days.   Quantity:  10 patch   Refills:  1       tadalafil 20 MG tablet   Commonly known as:  CIALIS   Used for:  Other male erectile dysfunction        Dose:  10-20 mg   Take 0.5-1 tablets (10-20 mg) by mouth daily as needed for erectile dysfunction Never use with nitroglycerin, terazosin or doxazosin.   Quantity:  6 tablet   Refills:  11       tiZANidine 4 MG tablet   Commonly known as:  ZANAFLEX   Used for:  Bursitis of left hip, Lumbar herniated disc        Dose:  4-8 mg   Take 1-2 tablets (4-8 mg) by mouth nightly as needed for muscle spasms   Quantity:  30 tablet   Refills:  1       traMADol 50 MG tablet   Commonly known as:  ULTRAM   Used for:  Hip pain, left        Dose:   mg   Take 1-2 tablets ( mg) by mouth every 6 hours as needed for pain   Quantity:  30 tablet   Refills:  0            Where to get your medicines      [facility] [address] [place] [address] [facility] [address]  [address] [facility], [facility] [address]     Phone:  [phone]     aspirin 81 MG EC tablet    metoprolol 25 MG 24 hr tablet                Protect others around you: Learn how to safely use, store and throw away your medicines at www.disposemymeds.org.             Medication List: This is a list of all your medications and when to take them. Check marks below indicate your daily home schedule. Keep this  list as a reference.      Medications           Morning Afternoon Evening Bedtime As Needed    ADVIL COLD/SINUS PO   Take 1 tablet by mouth 3 times daily as needed (for cold)                                amLODIPine-benazepril 10-40 MG per capsule   Commonly known as:  LOTREL   Take 1 capsule by mouth daily                                aspirin 81 MG EC tablet   Take 1 tablet (81 mg) by mouth daily   Start taking on:  3/22/2017   Last time this was given:  325 mg on 3/21/2017 11:32 AM                                gabapentin 100 MG capsule   Commonly known as:  NEURONTIN   Take 1 capsule (100 mg) by mouth 3 times daily                                IBUPROFEN PO   Take 400 mg by mouth every 6 hours as needed for moderate pain (Takes 400-6-800 mg as needed)                                metoprolol 25 MG 24 hr tablet   Commonly known as:  TOPROL-XL   Take 1 tablet (25 mg) by mouth daily   Start taking on:  3/22/2017                                naproxen 500 MG tablet   Commonly known as:  NAPROSYN   Take 1 tablet (500 mg) by mouth 2 times daily (with meals)                                nicotine 21 MG/24HR 24 hr patch   Commonly known as:  NICODERM CQ   Place 1 patch onto the skin every 24 hours                                rosuvastatin 10 MG tablet   Commonly known as:  CRESTOR   Take 1 tablet (10 mg) by mouth daily                                scopolamine 72 hr patch   Commonly known as:  TRANSDERM   Place 1 patch onto the skin every 72 hours.  Apply to hairless area behind one ear at least 4 hours before travel.  Remove old patch and change every 3 days.                                tadalafil 20 MG tablet   Commonly known as:  CIALIS   Take 0.5-1 tablets (10-20 mg) by mouth daily as needed for erectile dysfunction Never use with nitroglycerin, terazosin or doxazosin.                                tiZANidine 4 MG tablet   Commonly known as:  ZANAFLEX   Take 1-2 tablets (4-8 mg) by mouth nightly as  needed for muscle spasms                                traMADol 50 MG tablet   Commonly known as:  ULTRAM   Take 1-2 tablets ( mg) by mouth every 6 hours as needed for pain

## 2017-03-21 NOTE — PROGRESS NOTES
PIV removed from left ac. TR bands off, right wrist is soft and flat to palpation, no signs of bleeding or hematoma. Circulating RN went through DC instructions with patient and wife, no questions at this time. Patient ready for discharge, waiting for wheelchair.

## 2017-03-21 NOTE — PROGRESS NOTES
Prep and teaching complete for [facility] heart Cath. Pt to [facility] with RN. Wife to [facility] Waiting Room.

## 2017-03-21 NOTE — PROCEDURES
FINAL CARDIAC CATH REPORT:     PROCEDURES PERFORMED:   Coronary Angiography  Physiologic Assessment (FFR)    PHYSICIANS:  Attending Physician: Mike Burk MD  Interventional Cardiology Fellow: Case Duron MD  Cardiology Fellow: Diony Quigley MD, PhD    INDICATION:  Santos Rajan is a 61 year old male with PAD who had a positive nuclear stress test prior to operation.  Coronary angiogram for risk stratification.    DESCRIPTION:  1. Consent obtained with discussion of risks.  All questions were answered.  2. Sterile prep and procedure.  3. Location with Sheaths:   RT Radial Arterial  5 Fr Slender 10 cm [short]  4. Access: Local anesthetic with lidocaine.  A standard 18 guage needle with ultrasound guidance was used to establish vascular access using a modified Seldinger technique.  5. Diagnostic Catheters:   5 Fr  Kenan, 5Fr IL3.5  6. Estimated blood loss: < 5 ml    MEDICATIONS:  The procedure was performed under conscious sedation for 57 minutes from 1207 to 1304.  Midazolam 2 mg and Fentanyl 100 mcg were administered.  Heart rate, BP, respiration, oxygen saturation and patient responses were monitored throughout the procedure with the assistance of the RN under my supervision.  >> IA Verapamil and IA NTG were administered to prophylax against radial artery spasm.  >> Antiplatelet Therapy: ASA 81 mg     Procedures:    CORONARY ANGIOGRAM:   1. Both coronary arteries arise from their respective cusps.  2. Dominance: Right  3. The LM is long with less than 10% disease.  The LM gives rise to a ramus intermedius, LCx, and LAD.   4. LAD: Type 3 [LAD supplies the entire apex].. The LAD gives rise to septal perforators, D1.  There is a 10% proximal disease, a 50% lesion distal to diagonal 1.  There is less than 10% disease in D1.  5. LCX gives rise to a large atrial recurrent, OM1 which branches in two vessels.  One of the vessels is small (2 mm) and there is a 99% occlusion prior to branching.  Each of the two avery  beyond the subtotal occlusion are small in caliber and fill by left to left collaterals.  There are significant left to right collaterals and the LCx supplies that PDA.  6. The RI shows less than 10% disease.  7. RCA has 100% proximal occlusion.  There is a % lesion of the backfilled mid RCA.  The RCA gives rise to the PDA which is backfilled from the LCx.     FUNCTIONAL ASSESSMENT:  Adequate anticoagulation was was obtained with IV UFH. A Radi Aeris wire was passed across the lesion.  Hyperemia was induced with IC Adenosine (200 mcg).  The FFR at peak hyperemia was 0.87.    Sheath Removal:  The arterial sheath was manually removed in the cardiac catheterization laboratory.    Contrast: Isovue, 110 ml     Fluoroscopy Time: 15.9 min    COMPLICATIONS:  1. None    SUMMARY:   Diffuse coronary arterial disease.  Proximal RCA disease with distal LCx disease in small vessels.  Non physiologically significant in mid-LAD distal to D1.    PLAN:   >> ASA 81 mg qd  >> Start statin therapy and beta-blocker   >>. Discharge today per protocol    The attending interventional cardiologist was present and supervised all critical aspects the procedure.    Findings discussed with Dr. Granados.    See CVIS report for final draft.    Diony Quigley MD, PhD  Cardiology Fellow      Staff Cardiologist:  I supervised the cardiology fellows and reviewed the coronary angiogram and physiologic data with the fellows at the completion of the procedure.  In summary, the patient has 3 vessel CAD.  The RCA is occluded proximally but well collateralized (both Rt PDA and Rt PL) from the LAD and LCx systems.  PCI of the RCA would be extremely difficult because of the length of the chronic total occlusion.  The LMCA is normal.  The LAD has a moderate stenosis (41% by QCA) in the mid segment with an FFR of 0.87.  Therefore, revascularization of the LAD is not warranted.  The LCx has an occlusion of a relatively small branch that emerges from the  proximal segment of OM1 and is collateralized by the LCx system (OM1).  This vessel is too small to stent and balloon angioplasty would probably not yield an enduring result. The patient therefore has territory in the RCA and LCx distribution (inferior / lateral) that is ischemic.  In the absence of symptoms, I would not recommend PCI of the RCA (due to difficulty) or CABG (single vessel disease).  The patient remains at intermediate risk of a cardiovascular event, particularly if there is prolonged hypotension / bradycardia given the collateral dependent territory.    The results of the stress nuclear study (moderately sized reversible inferior wall) are consistent with inferior wall ischemia.  I recommend initiation of a beta blocker, ASA, and statin at this time.  If additional input is desired, I recommend cardiology consultation.    Mike Burk MD   Cardiology Staff

## 2017-03-21 NOTE — IP AVS SNAPSHOT
Unit 6D Observation [address] [address]    Phone:  [phone]    Fax:  [phone]                                       After Visit Summary   3/21/2017    Santos Rajan    MRN: [id number]           After Visit Summary Signature Page     I have received my discharge instructions, and my questions have been answered. I have discussed any challenges I see with this plan with the nurse or doctor.    ..........................................................................................................................................  Patient/Patient Representative Signature      ..........................................................................................................................................  Patient Representative Print Name and Relationship to Patient    ..................................................               ................................................  Date                                            Time    ..........................................................................................................................................  Reviewed by Signature/Title    ...................................................              ..............................................  Date                                                            Time

## 2017-03-23 ENCOUNTER — PRE VISIT (OUTPATIENT)
Dept: CARDIOLOGY | Facility: CLINIC | Age: 62
End: 2017-03-23

## 2017-03-23 LAB — INTERPRETATION ECG - MUSE: NORMAL

## 2017-03-23 ASSESSMENT — ENCOUNTER SYMPTOMS
SORE THROAT: 0
SMELL DISTURBANCE: 0
MUSCLE CRAMPS: 0
POSTURAL DYSPNEA: 0
CHILLS: 0
WEIGHT LOSS: 0
SEIZURES: 0
TROUBLE SWALLOWING: 0
POLYDIPSIA: 0
SINUS PAIN: 0
NECK MASS: 0
TASTE DISTURBANCE: 0
NECK PAIN: 1
SINUS CONGESTION: 1
PARALYSIS: 0
SPEECH CHANGE: 0
HOARSE VOICE: 0
TREMORS: 0
MYALGIAS: 1
MUSCLE WEAKNESS: 1
HEADACHES: 1
DIZZINESS: 0
FEVER: 0
TINGLING: 1
SHORTNESS OF BREATH: 0
COUGH: 1
LOSS OF CONSCIOUSNESS: 0
DECREASED APPETITE: 0
WHEEZING: 0
MEMORY LOSS: 0
ALTERED TEMPERATURE REGULATION: 0
COUGH DISTURBING SLEEP: 1
FATIGUE: 1
ARTHRALGIAS: 0
RESPIRATORY PAIN: 0
SPUTUM PRODUCTION: 1
SNORES LOUDLY: 0
WEAKNESS: 1
INCREASED ENERGY: 0
DYSPNEA ON EXERTION: 1
DISTURBANCES IN COORDINATION: 0
HEMOPTYSIS: 0
WEIGHT GAIN: 0
BACK PAIN: 1
POLYPHAGIA: 0
NIGHT SWEATS: 1
HALLUCINATIONS: 0
NUMBNESS: 1
JOINT SWELLING: 0

## 2017-03-25 ENCOUNTER — OFFICE VISIT (OUTPATIENT)
Dept: CARDIOLOGY | Facility: CLINIC | Age: 62
End: 2017-03-25
Attending: INTERNAL MEDICINE
Payer: COMMERCIAL

## 2017-03-25 VITALS
HEIGHT: 70 IN | OXYGEN SATURATION: 95 % | SYSTOLIC BLOOD PRESSURE: 117 MMHG | WEIGHT: 236 LBS | DIASTOLIC BLOOD PRESSURE: 75 MMHG | HEART RATE: 73 BPM | BODY MASS INDEX: 33.79 KG/M2

## 2017-03-25 DIAGNOSIS — I73.9 PAD (PERIPHERAL ARTERY DISEASE) (H): Primary | ICD-10-CM

## 2017-03-25 DIAGNOSIS — Z01.810 PRE-OPERATIVE CARDIOVASCULAR EXAMINATION: ICD-10-CM

## 2017-03-25 PROCEDURE — 40000809 ZZH STATISTIC NO DOCUMENTATION TO SUPPORT CHARGE

## 2017-03-25 PROCEDURE — 99203 OFFICE O/P NEW LOW 30 MIN: CPT | Performed by: INTERNAL MEDICINE

## 2017-03-25 PROCEDURE — 99212 OFFICE O/P EST SF 10 MIN: CPT | Mod: ZF

## 2017-03-25 RX ORDER — HYDROCORTISONE 2.5 %
CREAM (GRAM) TOPICAL
Refills: 3 | COMMUNITY
Start: 2017-02-19 | End: 2017-04-11

## 2017-03-25 ASSESSMENT — PAIN SCALES - GENERAL: PAINLEVEL: NO PAIN (0)

## 2017-03-25 NOTE — NURSING NOTE
Return Appointment: Patient given instructions regarding scheduling next clinic visit. Patient demonstrated understanding of this information and agreed to call with further questions or concerns.  Patient stated he understood all health information given and agreed to call with further questions or concerns.

## 2017-03-25 NOTE — PROGRESS NOTES
SUBJECTIVE:  Santos Rajan is a 61 year old male who presents forpre-op evaluation. Planning for endovascular stent for Lt leg PAD.  Patient not very active for 8 months due to claudication.  Denied prior cardiac history. No prior cardiac symptoms.  Smoker. HTN+. On statin. No DM. No premature CAD in family.    Patient Active Problem List    Diagnosis Date Noted     Status post coronary angiogram 03/21/2017     Priority: Medium     Hip pain, left 02/01/2017     Priority: Medium     Chronic bilateral low back pain with left-sided sciatica 02/01/2017     Priority: Medium     Diverticulosis of large intestine without hemorrhage 07/23/2016     Priority: Medium     Benign essential hypertension 06/14/2016     Priority: Medium     Advanced directives, counseling/discussion 09/17/2015     Priority: Medium     Advance Care Planning 9/17/2015: ACP Review and Resources Provided:  Reviewed chart for advance care plan.  Santos Rajan has no plan or code status on file.  available resources provided with information.  code status current choice pending further ACP discussions.   Added by CHARLA DOUGLAS           Impaired fasting glucose 09/01/2015     Priority: Medium     Low testosterone 09/01/2015     Priority: Medium     Fatty liver 09/01/2015     Priority: Medium     Umbilical hernia 09/01/2015     Priority: Medium     Colon polyps 09/01/2015     Priority: Medium     On Colon[facility] 11/9/[facility]       CARDIOVASCULAR SCREENING; LDL GOAL LESS THAN 160 08/06/2015     Priority: Medium    .  Current Outpatient Prescriptions   Medication Sig     hydrocortisone 2.5 % cream APPLY TO FACE TOPICALLY BID     IBUPROFEN PO Take 400 mg by mouth every 6 hours as needed for moderate pain (Takes 400-6-800 mg as needed)     Pseudoephedrine-Ibuprofen (ADVIL COLD/SINUS PO) Take 1 tablet by mouth 3 times daily as needed (for cold)     aspirin EC 81 MG EC tablet Take 1 tablet (81 mg) by mouth daily     metoprolol  (TOPROL-XL) 25 MG 24 hr tablet Take 1 tablet (25 mg) by mouth daily     rosuvastatin (CRESTOR) 10 MG tablet Take 1 tablet (10 mg) by mouth daily     gabapentin (NEURONTIN) 100 MG capsule Take 1 capsule (100 mg) by mouth 3 times daily     amLODIPine-benazepril (LOTREL) 10-40 MG per capsule Take 1 capsule by mouth daily     tadalafil (CIALIS) 20 MG tablet Take 0.5-1 tablets (10-20 mg) by mouth daily as needed for erectile dysfunction Never use with nitroglycerin, terazosin or doxazosin.     scopolamine (TRANSDERM) 72 hr patch Place 1 patch onto the skin every 72 hours.  Apply to hairless area behind one ear at least 4 hours before travel.  Remove old patch and change every 3 days.     traMADol (ULTRAM) 50 MG tablet Take 1-2 tablets ( mg) by mouth every 6 hours as needed for pain     naproxen (NAPROSYN) 500 MG tablet Take 1 tablet (500 mg) by mouth 2 times daily (with meals)     nicotine (NICODERM CQ) 21 MG/24HR patch 2h hr Place 1 patch onto the skin every 24 hours     tiZANidine (ZANAFLEX) 4 MG tablet Take 1-2 tablets (4-8 mg) by mouth nightly as needed for muscle spasms     No current facility-administered medications for this visit.      Past Medical History:   Diagnosis Date     Arthritis 1995    knee and hands     Benign essential hypertension 6/14/2016     Brain tumor (benign) (H)     Being monitored, [facility]      Diverticula of colon     No surgery      Past Surgical History:   Procedure Laterality Date     ABDOMEN SURGERY  1998    Gall bladder     CHOLECYSTECTOMY  1998     COLONOSCOPY  2015     DENTAL SURGERY      implants      EYE SURGERY  2013    Lasik     GALLBLADDER SURGERY      gall bladder removal      ORTHOPEDIC SURGERY  1996    Broken jaw & femur     Allergies   Allergen Reactions     Codeine      Social History     Social History     Marital status:      Spouse name: N/A     Number of children: N/A     Years of education: N/A     Occupational History     Not on file.     Social History Main  Topics     Smoking status: Current Some Day Smoker     Packs/day: 0.50     Years: 40.00     Start date: 3/1/1973     Last attempt to quit: 8/1/2015     Smokeless tobacco: Never Used      Comment: Quiting as of 7/1/15      Alcohol use 0.0 oz/week     0 Standard drinks or equivalent per week      Comment: minimal     Drug use: No     Sexual activity: Yes     Partners: Female     Birth control/ protection: None     Other Topics Concern     Parent/Sibling W/ Cabg, Mi Or Angioplasty Before 65f 55m? Yes     father - bypass surgery     Social History Narrative     Family History   Problem Relation Age of Onset     CANCER Mother      kidney     Other Cancer Mother      kidney     Aneurysm Father      Other Cancer Brother      lieukemia     CANCER Brother      Leukemia      Sleep Apnea Brother      Coronary Artery Disease No family hx of      DIABETES No family hx of           REVIEW OF SYSTEMS:  General: negative, fever, chills, night sweats  Skin: negative, acne, rash and scaling  Eyes: negative, double vision, eye pain and photophobia  Ears/Nose/Throat: negative, nasal congestion and purulent rhinorrhea  Respiratory: No dyspnea on exertion, No cough, No hemoptysis and negative  Cardiovascular: negative, palpitations, tachycardia, irregular heart beat, chest pain, exertional chest pain or pressure, paroxysmal nocturnal dyspnea, dyspnea on exertion and orthopnea  Gastrointestinal: negative, dysphagia, nausea and vomiting  Genitourinary: negative, nocturia, dysuria and frequency  Musculoskeletal: negative, fracture, back pain and neck pain  Neurologic: negative, headaches, syncope, stroke, seizures and paralysis  Psychiatric: negative, nervous breakdown, thoughts of self-harm and thoughts of hurting someone else  Hematologic/Lymphatic/Immunologic: negative, bleeding disorder, chills and fever  Endocrine: negative, cold intolerance, heat intolerance and hot flashes       OBJECTIVE:  Blood pressure 117/75, pulse 73, height  "1.778 m (5' 10\"), weight 107 kg (236 lb), SpO2 95 %.  General Appearance: alert and no distress  Head: Normocephalic. No masses, lesions, tenderness or abnormalities  Eyes: conjuctiva clear, PERRL, EOM intact  Ears: External ears normal. Canals clear. TM's normal.  Nose: Nares normal  Mouth: normal  Neck: Supple, no cervical adenopathy, no thyromegaly  Lungs: clear to auscultation  Cardiac: regular rate and rhythm, normal S1 and S2, no murmur  Abdomen: Soft, nontender.  Normal bowel sounds.  No hepatosplenomegaly or abnormal masses  Extremities: no peripheral edema.  Musculoskeletal: negative  Neurological: Cranial nerves 2-12 intact, motor strength intact       ASSESSMENT/PLAN:  Patient planning for Lt leg stent for PAD.  No cardiac complaints, though not very active for 8months due to claudication. No prior cardiac history.  HTN=. Smoker on patches to quit,On statin.  Had coronary angiogram due to abnormal stress MPI. Reviewed angiogram.  TO of RCA with collaterals. LCx-OM with severe disease. Too small for intervention. 50% LAD disease.  Patient may proceed with planned PAD treatment.  Adv. Not to smoke and need for statin and HTN control.  F/U with cardiology at [facility].  Per orders.   Return to Clinic PRN.  Answers for HPI/ROS submitted by the patient on 3/23/2017   General Symptoms: Yes  Skin Symptoms: No  HENT Symptoms: Yes  EYE SYMPTOMS: No  HEART SYMPTOMS: No  LUNG SYMPTOMS: Yes  INTESTINAL SYMPTOMS: No  URINARY SYMPTOMS: No  REPRODUCTIVE SYMPTOMS: No  SKELETAL SYMPTOMS: Yes  BLOOD SYMPTOMS: No  NERVOUS SYSTEM SYMPTOMS: Yes  MENTAL HEALTH SYMPTOMS: No  Fever: No  Loss of appetite: No  Weight loss: No  Weight gain: No  Fatigue: Yes  Night sweats: Yes  Chills: No  Increased stress: No  Excessive hunger: No  Excessive thirst: No  Feeling hot or cold when others believe the temperature is normal: No  Loss of height: No  Post-operative complications: No  Surgical site pain: No  Hallucinations: " No  Change in or Loss of Energy: No  Hyperactivity: No  Confusion: No  Ear pain: Yes  Ear discharge: No  Hearing loss: No  Tinnitus: No  Nosebleeds: No  Congestion: Yes  Sinus pain: No  Trouble swallowing: No   Voice hoarseness: No  Mouth sores: No  Sore throat: No  Tooth pain: Yes  Gum tenderness: No  Bleeding gums: No  Change in taste: No  Change in sense of smell: No  Hearing aid used: No  Neck lump: No  Cough: Yes  Sputum or phlegm: Yes  Coughing up blood: No  Difficulty breating or shortness of breath: No  Snoring: No  Wheezing: No  Difficulty breathing on exertion: Yes  Respiratory pain: No  Nighttime Cough: Yes  Difficulty breathing when lying flat: No  Back pain: Yes  Muscle aches: Yes  Neck pain: Yes  Swollen joints: No  Joint pain: No  Bone pain: No  Muscle cramps: No  Muscle weakness: Yes  Bone fracture: No  Trouble with coordination: No  Dizziness or trouble with balance: No  Fainting or black-out spells: No  Memory loss: No  Headache: Yes  Seizures: No  Speech problems: No  Tingling: Yes  Tremor: No  Weakness: Yes  Difficulty walking: Yes  Paralysis: No  Numbness: Yes

## 2017-03-25 NOTE — NURSING NOTE
Chief Complaint   Patient presents with     New Patient     61yr old male with a h/o HTN presenting for evaluation of new CAD s/p angio

## 2017-03-25 NOTE — PATIENT INSTRUCTIONS
"You were seen today in the Cardiovascular Clinic at the [facility].     Cardiology Providers you saw during your visit: Dr. Grant     Please schedule yearly Cardiology follow up with either us at the [place] or closer to home in [place].        Cholo Guillen RN  [facility]  Cardiology Care Coordinator    Questions and [phone].   First press #1 for the University and then press #3 for \"Medical Questions\" to reach us Cardiology Nurses.      "

## 2017-03-25 NOTE — MR AVS SNAPSHOT
"              After Visit Summary   3/25/2017    Santos Rajan    MRN: [id number]           Patient Information     Date Of Birth          1955        Visit Information        Provider Department      3/25/2017 11:00 AM FILIBERTO De Anda MD [facility]        Care Instructions    You were seen today in the Cardiovascular Clinic at the [facility].     Cardiology Providers you saw during your visit: Dr. Grant     Please schedule yearly Cardiology follow up with either us at the [facility] or closer to home in [place].        Cholo Guillen RN  [facility]  Cardiology Care Coordinator    Questions and [phone].   First press #1 for the [facility] and then press #3 for \"Medical Questions\" to reach us Cardiology Nurses.            Follow-ups after your visit        Your next 10 appointments already scheduled     Mar 28, 2017   Procedure with Nelly Granados MD   [facility] [place], Same Day Surgery (--)    [address]  [place] [address] [phone]              Who to contact     If you have questions or need follow up information about today's clinic visit or your schedule please contact [facility] directly at [phone].  Normal or non-critical lab and imaging results will be communicated to you by [employer], letter or phone within 4 business days after the clinic has received the results. If you do not hear from us within 7 days, please contact the clinic through [employer] or phone. If you have a critical or abnormal lab result, we will notify you by phone as soon as possible.  Submit refill requests through [employer] or call your pharmacy and they will forward the refill request to us. Please allow 3 business days for your refill to be completed.          Additional Information About Your Visit        MyChart Information     [employer] gives you secure access to your electronic health record. If you see a primary care provider, you can also send " "messages to your care team and make appointments. If you have questions, please call your primary care clinic.  If you do not have a primary care provider, please call [phone] and they will assist you.        Care EveryWhere ID     This is your Care EveryWhere ID. This could be used by other organizations to access your [facility] medical records  [id number]        Your Vitals Were     Pulse Height Pulse Oximetry BMI (Body Mass Index)          73 1.778 m (5' 10\") 95% 33.86 kg/m2         Blood Pressure from Last 3 Encounters:   03/25/17 117/75   03/21/17 124/79   02/27/17 (!) 164/100    Weight from Last 3 Encounters:   03/25/17 107 kg (236 lb)   03/21/17 108 kg (238 lb)   01/10/17 108.9 kg (240 lb)              Today, you had the following     No orders found for display       Primary Care Provider Office Phone # Fax #    Roly Tomas PA-C [phone]       [facility] [place] [address]  [place] [address]        Thank you!     Thank you for choosing [facility]  for your care. Our goal is always to provide you with excellent care. Hearing back from our patients is one way we can continue to improve our services. Please take a few minutes to complete the written survey that you may receive in the mail after your visit with us. Thank you!             Your Updated Medication List - Protect others around you: Learn how to safely use, store and throw away your medicines at www.disposemymeds.org.          This list is accurate as of: 3/25/17 11:17 AM.  Always use your most recent med list.                   Brand Name Dispense Instructions for use    ADVIL COLD/SINUS PO      Take 1 tablet by mouth 3 times daily as needed (for cold)       amLODIPine-benazepril 10-40 MG per capsule    LOTREL    90 capsule    Take 1 capsule by mouth daily       aspirin 81 MG EC tablet     60 tablet    Take 1 tablet (81 mg) by mouth daily       gabapentin 100 MG capsule    NEURONTIN    60 capsule    Take " 1 capsule (100 mg) by mouth 3 times daily       hydrocortisone 2.5 % cream      APPLY TO FACE TOPICALLY BID       IBUPROFEN PO      Take 400 mg by mouth every 6 hours as needed for moderate pain (Takes 400-6-800 mg as needed)       metoprolol 25 MG 24 hr tablet    TOPROL-XL    30 tablet    Take 1 tablet (25 mg) by mouth daily       naproxen 500 MG tablet    NAPROSYN    40 tablet    Take 1 tablet (500 mg) by mouth 2 times daily (with meals)       nicotine 21 MG/24HR 24 hr patch    NICODERM CQ    15 patch    Place 1 patch onto the skin every 24 hours       rosuvastatin 10 MG tablet    CRESTOR    30 tablet    Take 1 tablet (10 mg) by mouth daily       scopolamine 72 hr patch    TRANSDERM    10 patch    Place 1 patch onto the skin every 72 hours.  Apply to hairless area behind one ear at least 4 hours before travel.  Remove old patch and change every 3 days.       tadalafil 20 MG tablet    CIALIS    6 tablet    Take 0.5-1 tablets (10-20 mg) by mouth daily as needed for erectile dysfunction Never use with nitroglycerin, terazosin or doxazosin.       tiZANidine 4 MG tablet    ZANAFLEX    30 tablet    Take 1-2 tablets (4-8 mg) by mouth nightly as needed for muscle spasms       traMADol 50 MG tablet    ULTRAM    30 tablet    Take 1-2 tablets ( mg) by mouth every 6 hours as needed for pain

## 2017-03-25 NOTE — LETTER
3/25/2017      RE: Santos Rajan  [id number]  [facility] [address]       Dear Colleague,    Thank you for the opportunity to participate in the care of your patient, Santos Rajan, at the [facility] at [facility]. Please see a copy of my visit note below.       SUBJECTIVE:  Santos Rajan is a 61 year old male who presents forpre-op evaluation. Planning for endovascular stent for Lt leg PAD.  Patient not very active for 8 months due to claudication.  Denied prior cardiac history. No prior cardiac symptoms.  Smoker. HTN+. On statin. No DM. No premature CAD in family.    Patient Active Problem List    Diagnosis Date Noted     Status post coronary angiogram 03/21/2017     Priority: Medium     Hip pain, left 02/01/2017     Priority: Medium     Chronic bilateral low back pain with left-sided sciatica 02/01/2017     Priority: Medium     Diverticulosis of large intestine without hemorrhage 07/23/2016     Priority: Medium     Benign essential hypertension 06/14/2016     Priority: Medium     Advanced directives, counseling/discussion 09/17/2015     Priority: Medium     Advance Care Planning 9/17/2015: ACP Review and Resources Provided:  Reviewed chart for advance care plan.  Santos Rajan has no plan or code status on file.  available resources provided with information.  code status current choice pending further ACP discussions.   Added by CHARLA DOUGLAS           Impaired fasting glucose 09/01/2015     Priority: Medium     Low testosterone 09/01/2015     Priority: Medium     Fatty liver 09/01/2015     Priority: Medium     Umbilical hernia 09/01/2015     Priority: Medium     Colon polyps 09/01/2015     Priority: Medium     On Colonoscopy 11/9/2011 - [facility]       CARDIOVASCULAR SCREENING; LDL GOAL LESS THAN 160 08/06/2015     Priority: Medium    .  Current Outpatient Prescriptions   Medication Sig     hydrocortisone 2.5 % cream APPLY TO FACE  TOPICALLY BID     IBUPROFEN PO Take 400 mg by mouth every 6 hours as needed for moderate pain (Takes 400-6-800 mg as needed)     Pseudoephedrine-Ibuprofen (ADVIL COLD/SINUS PO) Take 1 tablet by mouth 3 times daily as needed (for cold)     aspirin EC 81 MG EC tablet Take 1 tablet (81 mg) by mouth daily     metoprolol (TOPROL-XL) 25 MG 24 hr tablet Take 1 tablet (25 mg) by mouth daily     rosuvastatin (CRESTOR) 10 MG tablet Take 1 tablet (10 mg) by mouth daily     gabapentin (NEURONTIN) 100 MG capsule Take 1 capsule (100 mg) by mouth 3 times daily     amLODIPine-benazepril (LOTREL) 10-40 MG per capsule Take 1 capsule by mouth daily     tadalafil (CIALIS) 20 MG tablet Take 0.5-1 tablets (10-20 mg) by mouth daily as needed for erectile dysfunction Never use with nitroglycerin, terazosin or doxazosin.     scopolamine (TRANSDERM) 72 hr patch Place 1 patch onto the skin every 72 hours.  Apply to hairless area behind one ear at least 4 hours before travel.  Remove old patch and change every 3 days.     traMADol (ULTRAM) 50 MG tablet Take 1-2 tablets ( mg) by mouth every 6 hours as needed for pain     naproxen (NAPROSYN) 500 MG tablet Take 1 tablet (500 mg) by mouth 2 times daily (with meals)     nicotine (NICODERM CQ) 21 MG/24HR patch 2h hr Place 1 patch onto the skin every 24 hours     tiZANidine (ZANAFLEX) 4 MG tablet Take 1-2 tablets (4-8 mg) by mouth nightly as needed for muscle spasms     No current facility-administered medications for this visit.      Past Medical History:   Diagnosis Date     Arthritis 1995    knee and hands     Benign essential hypertension 6/14/2016     Brain tumor (benign) (H)     Being monitored, Lizbeth      Diverticula of colon     No surgery      Past Surgical History:   Procedure Laterality Date     ABDOMEN SURGERY  1998    Gall bladder     CHOLECYSTECTOMY  1998     COLONOSCOPY  2015     DENTAL SURGERY      implants      EYE SURGERY  2013    Lasik     GALLBLADDER SURGERY      gall  bladder removal      ORTHOPEDIC SURGERY  1996    Broken jaw & femur     Allergies   Allergen Reactions     Codeine      Social History     Social History     Marital status:      Spouse name: N/A     Number of children: N/A     Years of education: N/A     Occupational History     Not on file.     Social History Main Topics     Smoking status: Current Some Day Smoker     Packs/day: 0.50     Years: 40.00     Start date: 3/1/1973     Last attempt to quit: 8/1/2015     Smokeless tobacco: Never Used      Comment: Quiting as of 7/1/15      Alcohol use 0.0 oz/week     0 Standard drinks or equivalent per week      Comment: minimal     Drug use: No     Sexual activity: Yes     Partners: Female     Birth control/ protection: None     Other Topics Concern     Parent/Sibling W/ Cabg, Mi Or Angioplasty Before 65f 55m? Yes     father - bypass surgery     Social History Narrative     Family History   Problem Relation Age of Onset     CANCER Mother      kidney     Other Cancer Mother      kidney     Aneurysm Father      Other Cancer Brother      lieukemia     CANCER Brother      Leukemia      Sleep Apnea Brother      Coronary Artery Disease No family hx of      DIABETES No family hx of           REVIEW OF SYSTEMS:  General: negative, fever, chills, night sweats  Skin: negative, acne, rash and scaling  Eyes: negative, double vision, eye pain and photophobia  Ears/Nose/Throat: negative, nasal congestion and purulent rhinorrhea  Respiratory: No dyspnea on exertion, No cough, No hemoptysis and negative  Cardiovascular: negative, palpitations, tachycardia, irregular heart beat, chest pain, exertional chest pain or pressure, paroxysmal nocturnal dyspnea, dyspnea on exertion and orthopnea  Gastrointestinal: negative, dysphagia, nausea and vomiting  Genitourinary: negative, nocturia, dysuria and frequency  Musculoskeletal: negative, fracture, back pain and neck pain  Neurologic: negative, headaches, syncope, stroke, seizures and  "paralysis  Psychiatric: negative, nervous breakdown, thoughts of self-harm and thoughts of hurting someone else  Hematologic/Lymphatic/Immunologic: negative, bleeding disorder, chills and fever  Endocrine: negative, cold intolerance, heat intolerance and hot flashes       OBJECTIVE:  Blood pressure 117/75, pulse 73, height 1.778 m (5' 10\"), weight 107 kg (236 lb), SpO2 95 %.  General Appearance: alert and no distress  Head: Normocephalic. No masses, lesions, tenderness or abnormalities  Eyes: conjuctiva clear, PERRL, EOM intact  Ears: External ears normal. Canals clear. TM's normal.  Nose: Nares normal  Mouth: normal  Neck: Supple, no cervical adenopathy, no thyromegaly  Lungs: clear to auscultation  Cardiac: regular rate and rhythm, normal S1 and S2, no murmur  Abdomen: Soft, nontender.  Normal bowel sounds.  No hepatosplenomegaly or abnormal masses  Extremities: no peripheral edema.  Musculoskeletal: negative  Neurological: Cranial nerves 2-12 intact, motor strength intact       ASSESSMENT/PLAN:  Patient planning for Lt leg stent for PAD.  No cardiac complaints, though not very active for 8months due to claudication. No prior cardiac history.  HTN=. Smoker on patches to quit,On statin.  Had coronary angiogram due to abnormal stress MPI. Reviewed angiogram.  TO of RCA with collaterals. LCx-OM with severe disease. Too small for intervention. 50% LAD disease.  Patient may proceed with planned PAD treatment.  Adv. Not to smoke and need for statin and HTN control.  F/U with cardiology at [facility].  Per orders.   Return to Clinic PRN.  Answers for HPI/ROS submitted by the patient on 3/23/2017   General Symptoms: Yes  Skin Symptoms: No  HENT Symptoms: Yes  EYE SYMPTOMS: No  HEART SYMPTOMS: No  LUNG SYMPTOMS: Yes  INTESTINAL SYMPTOMS: No  URINARY SYMPTOMS: No  REPRODUCTIVE SYMPTOMS: No  SKELETAL SYMPTOMS: Yes  BLOOD SYMPTOMS: No  NERVOUS SYSTEM SYMPTOMS: Yes  MENTAL HEALTH SYMPTOMS: No  Fever: No  Loss " of appetite: No  Weight loss: No  Weight gain: No  Fatigue: Yes  Night sweats: Yes  Chills: No  Increased stress: No  Excessive hunger: No  Excessive thirst: No  Feeling hot or cold when others believe the temperature is normal: No  Loss of height: No  Post-operative complications: No  Surgical site pain: No  Hallucinations: No  Change in or Loss of Energy: No  Hyperactivity: No  Confusion: No  Ear pain: Yes  Ear discharge: No  Hearing loss: No  Tinnitus: No  Nosebleeds: No  Congestion: Yes  Sinus pain: No  Trouble swallowing: No   Voice hoarseness: No  Mouth sores: No  Sore throat: No  Tooth pain: Yes  Gum tenderness: No  Bleeding gums: No  Change in taste: No  Change in sense of smell: No  Hearing aid used: No  Neck lump: No  Cough: Yes  Sputum or phlegm: Yes  Coughing up blood: No  Difficulty breating or shortness of breath: No  Snoring: No  Wheezing: No  Difficulty breathing on exertion: Yes  Respiratory pain: No  Nighttime Cough: Yes  Difficulty breathing when lying flat: No  Back pain: Yes  Muscle aches: Yes  Neck pain: Yes  Swollen joints: No  Joint pain: No  Bone pain: No  Muscle cramps: No  Muscle weakness: Yes  Bone fracture: No  Trouble with coordination: No  Dizziness or trouble with balance: No  Fainting or black-out spells: No  Memory loss: No  Headache: Yes  Seizures: No  Speech problems: No  Tingling: Yes  Tremor: No  Weakness: Yes  Difficulty walking: Yes  Paralysis: No  Numbness: Yes      Please do not hesitate to contact me if you have any questions/concerns.     Sincerely,     FILIBERTO De Anda MD

## 2017-03-27 ENCOUNTER — ANESTHESIA EVENT (OUTPATIENT)
Dept: SURGERY | Facility: CLINIC | Age: 62
End: 2017-03-27

## 2017-03-27 ASSESSMENT — LIFESTYLE VARIABLES: TOBACCO_USE: 1

## 2017-03-27 NOTE — OR NURSING
Left message and in basket messaged Ashley Pemberton that patient is on aspirin 81 mg and has not stopped prior to procedure 3/28/17.

## 2017-03-27 NOTE — PROGRESS NOTES
Vascular surgery staff    Called patient to review cardiology evaluation and risks of surgery.  Long discussion via phone regarding findings of coronary angiogram and outpatient cardiology clinic appointment.  Risks discussed with patient including MI, bleeding, infection of graft, failure of graft, failure of ability to cross the lesion, need for open surgery, colonic ischemia.  He voiced understanding and agrees to proceed.  Also discussed possibility of bilateral femoral endarterectomies and need for cutdown of femoral arteries.  Discussed with patient that he would be admitted to the hospital for 1-2 days post-op.  Time spent ~ 20-30 minutes.  Nelly Granados MD

## 2017-03-28 ENCOUNTER — ALLIED HEALTH/NURSE VISIT (OUTPATIENT)
Dept: SURGERY | Facility: CLINIC | Age: 62
End: 2017-03-28

## 2017-03-28 ENCOUNTER — SURGERY (OUTPATIENT)
Age: 62
End: 2017-03-28

## 2017-03-28 ENCOUNTER — HOSPITAL ENCOUNTER (OUTPATIENT)
Facility: CLINIC | Age: 62
Discharge: HOME | End: 2017-03-28
Attending: SURGERY | Admitting: SURGERY
Payer: COMMERCIAL

## 2017-03-28 ENCOUNTER — APPOINTMENT (OUTPATIENT)
Dept: GENERAL RADIOLOGY | Facility: CLINIC | Age: 62
End: 2017-03-28
Attending: SURGERY
Payer: COMMERCIAL

## 2017-03-28 ENCOUNTER — ANESTHESIA (OUTPATIENT)
Dept: SURGERY | Facility: CLINIC | Age: 62
End: 2017-03-28

## 2017-03-28 ENCOUNTER — OFFICE VISIT (OUTPATIENT)
Dept: SURGERY | Facility: CLINIC | Age: 62
End: 2017-03-28

## 2017-03-28 ENCOUNTER — ANESTHESIA EVENT (OUTPATIENT)
Dept: SURGERY | Facility: CLINIC | Age: 62
End: 2017-03-28

## 2017-03-28 VITALS
DIASTOLIC BLOOD PRESSURE: 77 MMHG | BODY MASS INDEX: 33.87 KG/M2 | RESPIRATION RATE: 16 BRPM | OXYGEN SATURATION: 95 % | HEIGHT: 70 IN | WEIGHT: 236.55 LBS | HEART RATE: 67 BPM | SYSTOLIC BLOOD PRESSURE: 105 MMHG | TEMPERATURE: 97.9 F

## 2017-03-28 VITALS
BODY MASS INDEX: 33.79 KG/M2 | RESPIRATION RATE: 16 BRPM | TEMPERATURE: 98.2 F | DIASTOLIC BLOOD PRESSURE: 72 MMHG | HEART RATE: 75 BPM | WEIGHT: 236 LBS | SYSTOLIC BLOOD PRESSURE: 104 MMHG | HEIGHT: 70 IN | OXYGEN SATURATION: 93 %

## 2017-03-28 DIAGNOSIS — Z86.2 HISTORY OF LEUKOCYTOSIS: ICD-10-CM

## 2017-03-28 DIAGNOSIS — Z01.818 PREOP EXAMINATION: Primary | ICD-10-CM

## 2017-03-28 DIAGNOSIS — D72.829 LEUKOCYTOSIS, UNSPECIFIED TYPE: Primary | ICD-10-CM

## 2017-03-28 DIAGNOSIS — R76.8 RED BLOOD CELL ANTIBODY POSITIVE: ICD-10-CM

## 2017-03-28 DIAGNOSIS — I71.40 ABDOMINAL AORTIC ANEURYSM (AAA) WITHOUT RUPTURE (H): ICD-10-CM

## 2017-03-28 LAB
ALBUMIN UR-MCNC: 10 MG/DL
ANION GAP SERPL CALCULATED.3IONS-SCNC: 5 MMOL/L (ref 3–14)
APPEARANCE UR: CLEAR
BASOPHILS # BLD AUTO: 0.1 10E9/L (ref 0–0.2)
BASOPHILS NFR BLD AUTO: 0.4 %
BILIRUB UR QL STRIP: NEGATIVE
BUN SERPL-MCNC: 26 MG/DL (ref 7–30)
CALCIUM SERPL-MCNC: 9 MG/DL (ref 8.5–10.1)
CHLORIDE SERPL-SCNC: 103 MMOL/L (ref 94–109)
CO2 SERPL-SCNC: 30 MMOL/L (ref 20–32)
COLOR UR AUTO: YELLOW
CREAT SERPL-MCNC: 0.83 MG/DL (ref 0.66–1.25)
DIFFERENTIAL METHOD BLD: ABNORMAL
EOSINOPHIL # BLD AUTO: 0.3 10E9/L (ref 0–0.7)
EOSINOPHIL NFR BLD AUTO: 1.9 %
ERYTHROCYTE [DISTWIDTH] IN BLOOD BY AUTOMATED COUNT: 14.2 % (ref 10–15)
GFR SERPL CREATININE-BSD FRML MDRD: ABNORMAL ML/MIN/1.7M2
GLUCOSE SERPL-MCNC: 111 MG/DL (ref 70–99)
GLUCOSE UR STRIP-MCNC: NEGATIVE MG/DL
HCT VFR BLD AUTO: 51.1 % (ref 40–53)
HGB BLD-MCNC: 16.6 G/DL (ref 13.3–17.7)
HGB UR QL STRIP: NEGATIVE
HYALINE CASTS #/AREA URNS LPF: 3 /LPF (ref 0–2)
IMM GRANULOCYTES # BLD: 0.6 10E9/L (ref 0–0.4)
IMM GRANULOCYTES NFR BLD: 3.5 %
INR PPP: 0.98 (ref 0.86–1.14)
KETONES UR STRIP-MCNC: NEGATIVE MG/DL
LEUKOCYTE ESTERASE UR QL STRIP: NEGATIVE
LYMPHOCYTES # BLD AUTO: 4.4 10E9/L (ref 0.8–5.3)
LYMPHOCYTES NFR BLD AUTO: 24.3 %
MCH RBC QN AUTO: 31.6 PG (ref 26.5–33)
MCHC RBC AUTO-ENTMCNC: 32.5 G/DL (ref 31.5–36.5)
MCV RBC AUTO: 97 FL (ref 78–100)
MONOCYTES # BLD AUTO: 1.9 10E9/L (ref 0–1.3)
MONOCYTES NFR BLD AUTO: 10.7 %
MUCOUS THREADS #/AREA URNS LPF: PRESENT /LPF
NEUTROPHILS # BLD AUTO: 10.7 10E9/L (ref 1.6–8.3)
NEUTROPHILS NFR BLD AUTO: 59.2 %
NITRATE UR QL: NEGATIVE
NRBC # BLD AUTO: 0 10*3/UL
NRBC BLD AUTO-RTO: 0 /100
PH UR STRIP: 5 PH (ref 5–7)
PLATELET # BLD AUTO: 265 10E9/L (ref 150–450)
POTASSIUM SERPL-SCNC: 4.5 MMOL/L (ref 3.4–5.3)
RBC # BLD AUTO: 5.26 10E12/L (ref 4.4–5.9)
RBC #/AREA URNS AUTO: 1 /HPF (ref 0–2)
SODIUM SERPL-SCNC: 138 MMOL/L (ref 133–144)
SP GR UR STRIP: 1.02 (ref 1–1.03)
SQUAMOUS #/AREA URNS AUTO: <1 /HPF (ref 0–1)
TRANS CELLS #/AREA URNS HPF: <1 /HPF (ref 0–1)
URN SPEC COLLECT METH UR: ABNORMAL
UROBILINOGEN UR STRIP-MCNC: NORMAL MG/DL (ref 0–2)
WBC # BLD AUTO: 18 10E9/L (ref 4–11)
WBC #/AREA URNS AUTO: 1 /HPF (ref 0–2)

## 2017-03-28 PROCEDURE — 80048 BASIC METABOLIC PNL TOTAL CA: CPT | Performed by: ANESTHESIOLOGY

## 2017-03-28 PROCEDURE — 85025 COMPLETE CBC W/AUTO DIFF WBC: CPT | Performed by: ANESTHESIOLOGY

## 2017-03-28 PROCEDURE — 86900 BLOOD TYPING SEROLOGIC ABO: CPT | Performed by: CLINICAL NURSE SPECIALIST

## 2017-03-28 PROCEDURE — 36415 COLL VENOUS BLD VENIPUNCTURE: CPT | Performed by: ANESTHESIOLOGY

## 2017-03-28 PROCEDURE — 71010 XR CHEST PORT 1 VW: CPT

## 2017-03-28 PROCEDURE — 86850 RBC ANTIBODY SCREEN: CPT | Performed by: CLINICAL NURSE SPECIALIST

## 2017-03-28 PROCEDURE — 81001 URINALYSIS AUTO W/SCOPE: CPT | Performed by: NURSE PRACTITIONER

## 2017-03-28 PROCEDURE — 82565 ASSAY OF CREATININE: CPT | Performed by: ANESTHESIOLOGY

## 2017-03-28 PROCEDURE — 86905 BLOOD TYPING RBC ANTIGENS: CPT | Performed by: CLINICAL NURSE SPECIALIST

## 2017-03-28 PROCEDURE — 86902 BLOOD TYPE ANTIGEN DONOR EA: CPT | Performed by: CLINICAL NURSE SPECIALIST

## 2017-03-28 PROCEDURE — 86901 BLOOD TYPING SEROLOGIC RH(D): CPT | Performed by: CLINICAL NURSE SPECIALIST

## 2017-03-28 PROCEDURE — 40000883 ZZH CANCELLED SURGERY UP TO 61-90 MINS: Performed by: SURGERY

## 2017-03-28 PROCEDURE — 85610 PROTHROMBIN TIME: CPT | Performed by: ANESTHESIOLOGY

## 2017-03-28 PROCEDURE — 86922 COMPATIBILITY TEST ANTIGLOB: CPT | Performed by: CLINICAL NURSE SPECIALIST

## 2017-03-28 PROCEDURE — 86870 RBC ANTIBODY IDENTIFICATION: CPT | Performed by: CLINICAL NURSE SPECIALIST

## 2017-03-28 RX ORDER — NALOXONE HYDROCHLORIDE 0.4 MG/ML
.1-.4 INJECTION, SOLUTION INTRAMUSCULAR; INTRAVENOUS; SUBCUTANEOUS
Status: DISCONTINUED | OUTPATIENT
Start: 2017-03-28 | End: 2017-03-28 | Stop reason: HOSPADM

## 2017-03-28 RX ORDER — LEVOFLOXACIN 500 MG/1
500 TABLET, FILM COATED ORAL DAILY
COMMUNITY
End: 2017-04-04

## 2017-03-28 RX ORDER — CEFAZOLIN SODIUM 2 G/100ML
2 INJECTION, SOLUTION INTRAVENOUS
Status: DISCONTINUED | OUTPATIENT
Start: 2017-03-28 | End: 2017-03-28 | Stop reason: HOSPADM

## 2017-03-28 RX ORDER — LIDOCAINE 40 MG/G
CREAM TOPICAL
Status: DISCONTINUED | OUTPATIENT
Start: 2017-03-28 | End: 2017-03-28 | Stop reason: HOSPADM

## 2017-03-28 RX ORDER — LEVOFLOXACIN 500 MG/1
500 TABLET, FILM COATED ORAL DAILY
Qty: 7 TABLET | Refills: 0 | Status: SHIPPED | OUTPATIENT
Start: 2017-03-28 | End: 2017-03-28

## 2017-03-28 RX ORDER — SODIUM CHLORIDE, SODIUM LACTATE, POTASSIUM CHLORIDE, CALCIUM CHLORIDE 600; 310; 30; 20 MG/100ML; MG/100ML; MG/100ML; MG/100ML
INJECTION, SOLUTION INTRAVENOUS CONTINUOUS
Status: DISCONTINUED | OUTPATIENT
Start: 2017-03-28 | End: 2017-03-28 | Stop reason: HOSPADM

## 2017-03-28 ASSESSMENT — PAIN DESCRIPTION - DESCRIPTORS: DESCRIPTORS: TINGLING

## 2017-03-28 ASSESSMENT — LIFESTYLE VARIABLES: TOBACCO_USE: 1

## 2017-03-28 NOTE — IP AVS SNAPSHOT
Same Day Surgery [address] [address]    Phone:  [phone]                                       After Visit Summary   3/28/2017    Santos Rajan    MRN: [id number]           After Visit Summary Signature Page     I have received my discharge instructions, and my questions have been answered. I have discussed any challenges I see with this plan with the nurse or doctor.    ..........................................................................................................................................  Patient/Patient Representative Signature      ..........................................................................................................................................  Patient Representative Print Name and Relationship to Patient    ..................................................               ................................................  Date                                            Time    ..........................................................................................................................................  Reviewed by Signature/Title    ...................................................              ..............................................  Date                                                            Time

## 2017-03-28 NOTE — OR NURSING
Clean catch urine taken to lab.  IV dc'd-catheter intact.  Site without swelling, redness, tenderness.  Patient dressing for discharge.   MAX Das RN

## 2017-03-28 NOTE — OR NURSING
"Blood Bank called with abnormal Type and Screen result of \"positive antibodies\".  Dr. Weir and Cecille- (staff rounding with Dr. Granados) notified.  Also communicated to Cecille that Dr. Weir requesting surgical delay until there are compatible units available.  Patient and wife notified as well that there will be a delay for surgery because of this result.  OR charge nurse notified of requested delay of surgery.  MAX Das RN  "

## 2017-03-28 NOTE — MR AVS SNAPSHOT
After Visit Summary   3/28/2017    Santos Rajan    MRN: [id number]           Patient Information     Date Of Birth          1955        Visit Information        Provider Department      3/28/2017 2:30 PM Rn, [facility]        Care Instructions    Preparing for Your Surgery      Name:  Santos Rajan   MRN:  [id number]   :  1955   Today's Date:  3/28/2017     Arriving for surgery:  Surgery date:  3/31/17  Surgery time:  10:10 AM  Arrival time:  8:10 AM  Please come to:       Health system [address]  [address] [place]  [address]    -   parking is available in front of the hospital from 5:15 am to 8:00 pm    -  Stop at the Information Desk in the lobby    -   Inform the information person that you are here for surgery. An escort to 3c will be provided. If you would not like an escort, please proceed to 3C on the 3rd floor. [phone]     What can I eat or drink?  -  You may have solid food or milk products until 8 hours prior to your surgery.  -  You may have water, apple juice or 7up/Sprite until 2 hours prior to your surgery.    Which medicines can I take?  -  Do NOT take Naproxen for 2-3 days before surgery.  No Ibuprofen x 24 hrs before surgery.      -  Please take these medications the day of surgery: all scheduled prescription mediciations      How do I prepare myself?  -  Take two showers: one the night before surgery; and one the morning of surgery.         Use Scrubcare or Hibiclens to wash from neck down.  You may use your own shampoo and conditioner. No other hair products.   -  Do NOT use lotion, powder, deodorant, or antiperspirant the day of your surgery.  -  Do NOT wear any makeup, fingernail polish or jewelry.  -  Begin using Incentive Spirometer 1 week prior to surgery.  Use 4 times per day, up to 5-10 breaths each time.  Bring Incentive Spirometer to hospital.  -Do not bring your own  medications to the hospital, except for inhalers and eye drops.  -  Bring your ID and insurance card.    Questions or Concerns:  If you have questions or concerns, please call the  Preoperative Assessment Center, Monday-Friday 7AM-7PM:  [phone]        Using an Incentive Spirometer  Soon after your surgery, a nurse or therapist will teach you breathing exercises. These keep your lungs clear, strengthen your breathing muscles, and help prevent complications.  The exercises include doing a deep-breathing exercise using a device called an incentive spirometer.  To do these exercises, you will breathe in through your mouth and not your nose. The incentive spirometer only works correctly if you breathe in through your mouth.  Four steps to clear lungs     Deep breathing expands the lungs, aids circulation, and helps prevent pneumonia.   1. Exhale normally.    Relax and breathe out.  2. Place your lips tightly around the mouthpiece.    Make sure the device is upright and not tilted.  3. Inhale as much air as you can through the mouthpiece (don't breath through your nose).    Inhale slowly and deeply.    Hold your breath long enough to keep the balls or disk raised for at least 3 seconds.    If you re inhaling too quickly, your device may make a tone. If you hear this tone, inhale more slowly.  4. Repeat the exercise regularly.    Do this exercise every hour while you're awake, or as your health care provider instructs.    You will also be taught coughing exercises and be asked to do them regularly on your own.    3441-5900 [employer]. [address] [place] [address], [place] [address]. All rights reserved. This information is not intended as a substitute for professional medical care. Always follow your healthcare professional's instructions.          AFTER YOUR SURGERY  Breathing exercises   Breathing exercises help you recover faster. Take deep breaths and let the air out slowly. This will:     Help you wake  up after surgery.    Help prevent complications like pneumonia.  Preventing complications will help you go home sooner.   We may give you a breathing device (incentive spirometer) to encourage you to breathe deeply.   Nausea and vomiting   You may feel sick to your stomach after surgery; if so, let your nurse know.    Pain control:  After surgery, you may have pain. Our goal is to help you manage your pain. Pain medicine will help you feel comfortable enough to do activities that will help you heal.  These activities may include breathing exercises, walking and physical therapy.   To help your health care team treat your pain we will ask: 1) If you have pain  2) where it is located 3) describe your pain in your words  Methods of pain control include medications given by mouth, vein or by nerve block for some surgeries.  We may give you a pain control pump that will:  1) Deliver the medicine through a tube placed in your vein  2) Control the amount of medicine you receive  3) Allow you to push a button to deliver a dose of pain medicine  Sequential Compression Device (SCD) or Pneumo Boots:  You may need to wear SCD S on your legs or feet. These are wraps connected to a machine that pumps in air and releases it. The repeated pumping helps prevent blood clots from forming.                 Follow-ups after your visit        Your next 10 appointments already scheduled     Mar 28, 2017  2:30 PM CDT   (Arrive by 2:15 PM)   PAC RN ASSESSMENT with Daniel Pac Rn   [facility] ([facility])    [address] [place] [id number]   [phone]            Mar 28, 2017  3:00 PM CDT   (Arrive by 2:45 PM)   PAC EVALUATION with Daneil Pac Stanford 1   [facility] ([facility])    [address] [place] [id number]   [phone]            Mar 28, 2017  4:10 PM CDT   (Arrive by 3:55 PM)   PAC  Anesthesia Consult with  Pac Anesthesiologist   [facility] Pre[facility] ([facility])    [address]  4th Floor  [facility] [id number] [phone]            Mar 31, 2017   Procedure with Nelly Granados MD   [facility], [place], Same Day Surgery (--)    [address] [facility] [id number] [phone]            Apr 18, 2017   Procedure with Nelly Granados MD   [facility] [place], Same Day Surgery (--)    [address] [facility] [id number] [phone]              Future tests that were ordered for you today     Open Future Orders        Priority Expected Expires Ordered    Red blood cell have available Routine 3/31/2017 4/1/2017 3/28/2017    WBC count Routine 3/30/2017 4/3/2017 3/28/2017            Who to contact     Please call your clinic at [phone] to:    Ask questions about your health    Make or cancel appointments    Discuss your medicines    Learn about your test results    Speak to your doctor   If you have compliments or concerns about an experience at your clinic, or if you wish to file a complaint, please contact [facility] Patient Relations at [phone] or email us at [email or web address]         Additional Information About Your Visit        [facility] [employer] Information     [employer] gives you secure access to your electronic health record. If you see a primary care provider, you can also send messages to your care team and make appointments. If you have questions, please call your primary care clinic.  If you do not have a primary care provider, please call [phone] and they will assist you.      [employer] is an electronic gateway that provides easy, online access to your medical records. With [employer], you can request a clinic appointment, read your test results, renew a prescription or communicate with your care team.     To access your existing account, please contact your [facility] or call [phone] for assistance.        Care EveryWhere ID     This is your Care EveryWhere ID. This could be used by other organizations to access your [place] medical records  [id number]         Blood Pressure from Last 3 Encounters:   03/28/17 105/77   03/25/17 117/75   03/21/17 124/79    Weight from Last 3 Encounters:   03/28/17 107.3 kg (236 lb 8.9 oz)   03/25/17 107 kg (236 lb)   03/21/17 108 kg (238 lb)              Today, you had the following     No orders found for display         Today's Medication Changes          These changes are accurate as of: 3/28/17  1:45 PM.  If you have any questions, ask your nurse or doctor.               These medicines have changed or have updated prescriptions.        Dose/Directions    amLODIPine-benazepril 10-40 MG per capsule   Commonly known as:  LOTREL   This may have changed:  when to take this   Used for:  Benign essential hypertension        Dose:  1 capsule   Take 1 capsule by mouth daily   Quantity:  90 capsule   Refills:  1       aspirin 81 MG EC tablet   This may have changed:  when to take this   Used for:  Coronary atherosclerosis due to lipid rich plaque        Dose:  81 mg   Take 1 tablet (81 mg) by mouth daily   Quantity:  60 tablet   Refills:  3       gabapentin 100 MG capsule   Commonly known as:  NEURONTIN   This may have changed:    - when to take this  - reasons to take this   Used for:  Lumbar disc disorder        Dose:  100 mg   Take 1 capsule (100 mg) by mouth 3 times daily   Quantity:  60 capsule   Refills:  0       metoprolol 25 MG 24 hr tablet   Commonly known as:  TOPROL-XL   This may have changed:  when to take this   Used for:  Coronary atherosclerosis due to lipid rich plaque        Dose:  25 mg   Take 1 tablet (25 mg) by mouth daily   Quantity:  30 tablet   Refills:  3       naproxen 500 MG tablet   Commonly known as:  NAPROSYN   This may have changed:    - when to take this  - reasons to take this   Used for:  Pain  of left sacroiliac joint, Lumbar degenerative disc disease        Dose:  500 mg   Take 1 tablet (500 mg) by mouth 2 times daily (with meals)   Quantity:  40 tablet   Refills:  1       rosuvastatin 10 MG tablet   Commonly known as:  CRESTOR   This may have changed:  when to take this   Used for:  PVD (peripheral vascular disease) (H)        Dose:  10 mg   Take 1 tablet (10 mg) by mouth daily   Quantity:  30 tablet   Refills:  1       traMADol 50 MG tablet   Commonly known as:  ULTRAM   This may have changed:  when to take this   Used for:  Hip pain, left        Dose:   mg   Take 1-2 tablets ( mg) by mouth every 6 hours as needed for pain   Quantity:  30 tablet   Refills:  0                Primary Care Provider Office Phone # Fax #    Roly Tomas PA-C [phone]       [facility] [place] [address]  [place] [address]        Thank you!     Thank you for choosing [facility]  for your care. Our goal is always to provide you with excellent care. Hearing back from our patients is one way we can continue to improve our services. Please take a few minutes to complete the written survey that you may receive in the mail after your visit with us. Thank you!             Your Updated Medication List - Protect others around you: Learn how to safely use, store and throw away your medicines at www.disposemymeds.org.          This list is accurate as of: 3/28/17  1:45 PM.  Always use your most recent med list.                   Brand Name Dispense Instructions for use    amLODIPine-benazepril 10-40 MG per capsule    LOTREL    90 capsule    Take 1 capsule by mouth daily       aspirin 81 MG EC tablet     60 tablet    Take 1 tablet (81 mg) by mouth daily       gabapentin 100 MG capsule    NEURONTIN    60 capsule    Take 1 capsule (100 mg) by mouth 3 times daily       hydrocortisone 2.5 % cream      APPLY TO FACE TOPICALLY BID       IBUPROFEN PO      Take 400 mg by mouth  every 6 hours as needed for moderate pain (Takes 400-6-800 mg as needed)       levofloxacin 500 MG tablet    LEVAQUIN     Take 500 mg by mouth daily       metoprolol 25 MG 24 hr tablet    TOPROL-XL    30 tablet    Take 1 tablet (25 mg) by mouth daily       naproxen 500 MG tablet    NAPROSYN    40 tablet    Take 1 tablet (500 mg) by mouth 2 times daily (with meals)       nicotine 21 MG/24HR 24 hr patch    NICODERM CQ    15 patch    Place 1 patch onto the skin every 24 hours       rosuvastatin 10 MG tablet    CRESTOR    30 tablet    Take 1 tablet (10 mg) by mouth daily       traMADol 50 MG tablet    ULTRAM    30 tablet    Take 1-2 tablets ( mg) by mouth every 6 hours as needed for pain

## 2017-03-28 NOTE — ANESTHESIA PREPROCEDURE EVALUATION
Anesthesia Evaluation     . Pt has had prior anesthetic. Type: General and MAC    No history of anesthetic complications          ROS/MED HX    ENT/Pulmonary:     (+)RAYNE risk factors hypertension, tobacco use, Current use half to 1 PPD for 40 years packs/day  , recent URI unresolved curently having cough and elevated WBC, has gotten better according to patient, Laureano initiated: . .    Neurologic:     (+)other neuro meningioma followed by [facility]    Cardiovascular: Comment: AAA 5.1 cm, bilateral iliac disease    (+) Dyslipidemia, hypertension-Peripheral Vascular Disease-- Other and Symptomatic, CAD, --. Taking blood thinners Pt has received instructions: Instructions Given to patient: ASA 81 mg at HS, will remain on. . . :. . Previous cardiac testing date:results:Stress Testdate:3/2/17 results:Impression  1. Myocardial perfusion imaging using single isotope technique demonstrated a small to moderately sized reversible inferior wall defect suggesting myocardial ischemia. The adequacy of this finding is likely reduced due to the presence of diaphragmatic attenuation.  2. Gated images demonstrated normal wall motion. The left ventricular systolic function is normal with a calculated ejection fraction of 65%. There are no previous studies for comparison .     ECG reviewed date:3/21/17 results:SR with RBBBCath date: 3/21/17 results:2 vessel CAD, TO RCA and occlusion LCx (OM 99% occluded).          METS/Exercise Tolerance:  3 - Able to walk 1-2 blocks without stopping   Hematologic:     (+) Other Hematologic Disorder-ANTI-Nissa blood antibody     (-) History of Transfusion   Musculoskeletal:   (+) arthritis, , , other musculoskeletal- chronic back pain      GI/Hepatic:  - neg GI/hepatic ROS       Renal/Genitourinary:  - ROS Renal section negative       Endo:     (+) Obesity, .      Psychiatric:  - neg psychiatric ROS       Infectious Disease:  - neg infectious disease ROS       Malignancy:      - no malignancy   Other:     (+) No chance of pregnancy C-spine cleared: N/A, H/O Chronic Pain,no other significant disability                    Physical Exam      Airway   Mallampati: II  TM distance: >3 FB  Neck ROM: full    Dental   (+) implants  Comment: Multiple implants upper front and lower right side.    Cardiovascular   Rhythm and rate: regular and normal      Pulmonary    breath sounds clear to auscultation(+) decreased breath sounds       Other findings: For further details of assessment, testing, and physical exam please see H and P completed on same date.           PAC Discussion and Assessment    ASA Classification: 3  Case is suitable for: [place]  Anesthetic techniques and relevant risks discussed: GA  Invasive monitoring and risk discussed: No  Types:   Possibility and Risk of blood transfusion discussed: Yes  NPO instructions given:   Additional anesthetic preparation and risks discussed:   Needs early admission to pre-op area:   Other:     PAC Resident/NP Anesthesia Assessment:  Santos Rajan is a 61 year old male scheduled to undergo endovascular aorto-bifemoral repair with Dr. Granados on 3/31/17. He has the following specific operative considerations:   - RCRI : No serious cardiac risks.    - VTE risk: 0.5%  - RAYNE # of risks 4/8 = Intermediate  - Risk of PONV score = 0.  If > 2, anti-emetic intervention recommended.    BMI 34. 107 kg. Last procedure for angiogram. Last GA for albin. No history of problems with anesthesia.     --Abdominal aortic aneurysm 5.1 cm and bilateral iliac occlusive disease with lifestyle limiting claudication. Above procedure planned.   --HLD. Crestor. HYPERTENSION. Lotrel and Metoprolol taken at HS. CAD, small vessel disease, not requiring intervention. Cardiologist preop evaluation with recommendation to proceed to surgery. ASA 81 mg at HS and will remain on. All testing above. Activity limited due to hip and leg pain.    --Smoking history 20 pack years and continues to smoke a few  cigarettes. Trying to quit. Recent URI, with productive cough and some shortness of breath. CHEST X-RAY today showed aright lung opacity, thought likely to be pneumonia. Levaquin 500 mg was initiated beginning today for 7 days and will take on DOS. WBC 18 today. Will recheck via at outside clinic in Thursday for improvement.   --Chronic back pain. Tramadol and Gabapentin prn.   --Positive blood antibody. Anti-Nissa. Type and screen drawn today, will be good for surgery on 3/31/17, confirmed with Blood Bank. One unit PRBC ordered to be available for DOS.   --Umbilical hernia, chronic with surrounding purplish skin discoloration.   --Stable extraaxial mass adjacent to the right frontal-parietal junction. Findings compatible with a meningioma followed by [facility].      Patient was discussed with Dr Maier and Dr. Granados.  Addendum: Repeat WBC was 16. Surgery team decided to reschedule surgery. They will continue to follow patient's white count and have him return to repeat type and screen 1-3 days prior to surgery.    Reviewed and Signed by PAC Mid-Level Provider/Resident  Mid-Level Provider/Resident: DEANA Mcknight, CNS  Date: 3/28/17  Time: 3:40pm    Attending Anesthesiologist Anesthesia Assessment:  Endovascular repair of abdominal aneurysm canceled today because of concern about increased leucocyte count and blood antibodies. Pt had cardiac and hematological w/u recently. 1. Cardiac-Recent lexiscan showed good EF and small reversible ischemia area. Follow-up coronary angiogram showed LAD-105 PROXIMAL STENOSIS,, 50 % lesion distal to diagonal 1, LCX  GIVES rise to a large atrial recurrent OM1,WHICH BRANCHES INTO 2 vessels. One is small and there is 99% occlusio prior to branching , Significant L TO r COLLATERAL AND THE lcx supplies the PCA. RCA % PROLIMAL OCCLUSION. The RCA GIVES RISE TO THE pda WHICH IS BACKFILLED FROM THE lcs. Summary= diffuse coronary disease. Proximal RCA DISEASE WITH DISTAL LCX  DISEASE IN SMALL VESSELS. 2. cHEST X-RAY TODAY=NL CARDIAC SILHOUETT r MEDIAL LUNG OPAC ITY-NEW SINCE 6/14/16. nO PLEURAL EFFUSION. 3. sTILL SMOKES-BUT HAS CUT BACK. 4. HTN -RX WITH BENAZEPRI, METOPROLOL, AMLODIPINE L-POOR COMPLIANCE. 4. HEMO CONSULT 11/15/93-HYFFQOQSIRLY-VVH-SPECIFIC.   Considerations1. White count to be repeated this Thursday day before his scheduled surgery. 2. Small,chronic umbilical hernia.   I have examined the patient and reviewed the record with the above resident or APN and agree with above assessment and recommendations.     Des Maier M.D.  Staff Anesthesiologist  March 28, 2017, 2:21 PM        Anesthesiologist:   Date:   Time:   Pass/Fail:   Disposition:     PAC Pharmacist Assessment:        Pharmacist:   Date:   Time:                           .

## 2017-03-28 NOTE — OR NURSING
Cecille LEBLANC CNP at bedside to speak with patient and wife about cancellation of today's surgery and follow-up needed.  Patient given prescription and instructions for Levaquin antibiotic per Cecille LEBLANC CNP.  AVS printed and reviewed with patient.  Patient will be seen at 2:30 today in the PAC for coordination and planning for blood products to be ready when patient's surgery is rescheduled as well as recommendations for patient to follow up with repeat lab testing for elevated WBC on today's lab.  Verbal understanding expressed by patient and wife regarding instructions.  MAX Das RN

## 2017-03-28 NOTE — ANESTHESIA PREPROCEDURE EVALUATION
Anesthesia Evaluation     . Pt has had prior anesthetic. Type: General    No history of anesthetic complications          ROS/MED HX    ENT/Pulmonary:     (+)RAYNE risk factors hypertension, tobacco use, Current use half to 1 PPD for 40 years packs/day  , recent URI unresolved curently having cough and elevated WBC, has gotten better according to patient : . .    Neurologic:     (+)other neuro brain tumour (benign)    Cardiovascular:     (+) hypertension--CAD, --. : . . . :. . Previous cardiac testing date:results:Stress Testdate:3/2/17 results:GATED MYOCARDIAL PERFUSION SCINTIGRAPHY WITH INTRAVENOUS PHARMACOLOGIC  VASODILATATION LEXISCAN -ONE DAY STUDY     3/2/2017 12:48 PM EROS SUTTON 61 years Male 1955.     Indication/Clinical History: Preoperative assessment     Impression  1. Myocardial perfusion imaging using single isotope technique  demonstrated a small to moderately sized reversible inferior wall  defect suggesting myocardial ischemia. The adequacy of this finding is  likely reduced due to the presence of diaphragmatic attenuation.  2. Gated images demonstrated normal wall motion. The left ventricular  systolic function is normal with a calculated ejection fraction of  65%.  There are no previous studies for comparison .     Procedure  Pharmacologic stress testing was performed with Lexiscan at a rate of  0.08 mg/ml rapid bolus injection, for 15 seconds, 0.4 mg/5ml  intravenously. Low-level exercise was performed along with the  vasodilator infusion. The heart rate was 64 at baseline and thomas to  81 beats per minute during the Lexiscan infusion. The rest blood  pressure was 142/78 mmHg and was 152/86 mm Hg during Lexiscan  infusion. The patient experienced no chest pain or shortness of breath  during the test.     Myocardial perfusion imaging was performed at rest, approximately 45  minutes after the injection intravenously of 10.7 mCi of Tc-99m  Myoview. At peak pharmacologic effect, 10-20 seconds  after Lexiscan,   the patient was injected intravenously with 33 mCi of Tc-99m Myoview.  The post-stress tomographic imaging was performed approximately 60  minutes after stress.     EKG Findings  The resting EKG demonstrated sinus rhythm with a right bundle branch  block patent nonspecific ST segment changes. The stress EKG  demonstrated nondiagnostic changes due to patient movement.     Tomographic Findings  Overall, the study quality is probably adequate though there is  significant diaphragmatic attenuation . On the stress images, a small  to moderately sized inferior wall defect with a moderate to severe  reduction in radiotracer uptake is noted. On the rest images,  significant reversibility of the defect appeared present . Gated  images demonstrated normal wall motion. The left ventricular ejection  fraction was calculated to be 65%. TID was absent.ECG reviewed date:3/21/17 results:SR with RBBBCath date: 3/21/17 results:2 vessel CAD, TO RCA and occlusion LCx (OM 99% occluded).          METS/Exercise Tolerance:     Hematologic:     (+) Other Hematologic Disorder-Positive antibodies on today's T&S     (-) History of Transfusion   Musculoskeletal:   (+) arthritis, , , -       GI/Hepatic:         Renal/Genitourinary:  - ROS Renal section negative       Endo:         Psychiatric:  - neg psychiatric ROS       Infectious Disease:  - neg infectious disease ROS       Malignancy:      - no malignancy   Other:    - neg other ROS               ANESTHESIA PREOP EVALUATION    Procedure: Procedure(s):  Endovascular Abdominal Aortic Aneurysm  - Wound Class:     HPI: Sanots Rajan is a 61 year old male who is presenting for above stated procedure.    PMHx/PSHx/ROS:  Past Medical History:   Diagnosis Date     Arthritis 1995    knee and hands     Benign essential hypertension 6/14/2016     Brain tumor (benign) (H)     Being monitored, [facility]      Diverticula of colon     No surgery        Past Surgical History:   Procedure  Laterality Date     ABDOMEN SURGERY  1998    Gall bladder     CHOLECYSTECTOMY  1998     COLONOSCOPY  2015     DENTAL SURGERY      implants      EYE SURGERY  2013    Lasik     GALLBLADDER SURGERY      gall bladder removal      ORTHOPEDIC SURGERY  1996    Broken jaw & femur       ROS as stated above    Soc Hx:   Social History   Substance Use Topics     Smoking status: Current Some Day Smoker     Packs/day: 0.50     Years: 40.00     Start date: 3/1/1973     Last attempt to quit: 8/1/2015     Smokeless tobacco: Never Used      Comment: Quiting as of 7/1/15      Alcohol use 0.0 oz/week     0 Standard drinks or equivalent per week      Comment: minimal       Allergies:   Allergies   Allergen Reactions     Codeine      Patient reports mom had severe reaction to codeine       Meds:   No prescriptions prior to admission.       Current Outpatient Prescriptions   Medication Sig Dispense Refill     Pseudoephedrine-Ibuprofen (ADVIL COLD/SINUS PO) Take 1 tablet by mouth 3 times daily as needed (for cold)       aspirin EC 81 MG EC tablet Take 1 tablet (81 mg) by mouth daily 60 tablet 3     metoprolol (TOPROL-XL) 25 MG 24 hr tablet Take 1 tablet (25 mg) by mouth daily 30 tablet 3     rosuvastatin (CRESTOR) 10 MG tablet Take 1 tablet (10 mg) by mouth daily 30 tablet 1     gabapentin (NEURONTIN) 100 MG capsule Take 1 capsule (100 mg) by mouth 3 times daily (Patient taking differently: Take 100 mg by mouth 3 times daily Patient states usually takes in the evening) 60 capsule 0     amLODIPine-benazepril (LOTREL) 10-40 MG per capsule Take 1 capsule by mouth daily 90 capsule 1     tadalafil (CIALIS) 20 MG tablet Take 0.5-1 tablets (10-20 mg) by mouth daily as needed for erectile dysfunction Never use with nitroglycerin, terazosin or doxazosin. 6 tablet 11     traMADol (ULTRAM) 50 MG tablet Take 1-2 tablets ( mg) by mouth every 6 hours as needed for pain 30 tablet 0     naproxen (NAPROSYN) 500 MG tablet Take 1 tablet (500 mg) by  mouth 2 times daily (with meals) 40 tablet 1     nicotine (NICODERM CQ) 21 MG/24HR patch 2h hr Place 1 patch onto the skin every 24 hours 15 patch 0     tiZANidine (ZANAFLEX) 4 MG tablet Take 1-2 tablets (4-8 mg) by mouth nightly as needed for muscle spasms 30 tablet 1     hydrocortisone 2.5 % cream APPLY TO FACE TOPICALLY BID  3     IBUPROFEN PO Take 400 mg by mouth every 6 hours as needed for moderate pain (Takes 400-6-800 mg as needed)       scopolamine (TRANSDERM) 72 hr patch Place 1 patch onto the skin every 72 hours.  Apply to hairless area behind one ear at least 4 hours before travel.  Remove old patch and change every 3 days. 10 patch 1       Physical Exam:  VS:      , Weight   Wt Readings from Last 2 Encounters:   03/25/17 107 kg (236 lb)   03/21/17 108 kg (238 lb)       Labs:    BMP:  Recent Labs   Lab Test  03/21/17   1055   NA  143   POTASSIUM  4.2   CHLORIDE  107   CO2  29   BUN  17   CR  0.91   GLC  111*   YONATAN  8.8     LFTs:   Recent Labs   Lab Test  06/14/16   1247   PROTTOTAL  7.9   ALBUMIN  3.5   BILITOTAL  0.8   ALKPHOS  89   AST  16   ALT  19     CBC:   Recent Labs   Lab Test  03/21/17   1055   WBC  10.3   RBC  5.20   HGB  16.2   HCT  50.0   MCV  96   MCH  31.2   MCHC  32.4   RDW  14.5   PLT  200     Coags:  No results for input(s): INR, PTT, FIBR in the last 09249 hours.  Physical Exam      Airway   Mallampati: III  TM distance: >3 FB  Neck ROM: full    Dental   (+) implants    Cardiovascular   Rhythm and rate: regular and normal      Pulmonary    breath sounds clear to auscultation                    Anesthesia Plan      History & Physical Review  History and physical reviewed and following examination; no interval change.    ASA Status:  3 .    NPO Status:  > 8 hours    Plan for General and ETT with Propofol and Intravenous induction. Maintenance will be Balanced.    PONV prophylaxis:  Ondansetron (or other 5HT-3)  Additional equipment: 2nd IV, Arterial Line and Central Line -possible MARSHAL       Postoperative Care  Postoperative pain management:  IV analgesics.      Consents  Anesthetic plan, risks, benefits and alternatives discussed with:  Patient.  Use of blood products discussed: Yes.   Consented to blood products.  .        Patient discussed with Staff Anesthesiologist.    Ian Jin  Anesthesia Resident CA-3  Pager [phone]  March 27, 2017, 10:15 PM                  .

## 2017-03-28 NOTE — PATIENT INSTRUCTIONS
Preparing for Your Surgery      Name:  Santos Rajan   MRN:  [id number]   :  1955   Today's Date:  3/28/2017     Arriving for surgery:  Surgery date:  3/31/17  Surgery time:  10:10 AM  Arrival time:  8:10 AM  Please come to:       [facility] [address]  [address] [place]  [address]    -   parking is available in front of the hospital from 5:15 am to 8:00 pm    -  Stop at the Information Desk in the lobby    -   Inform the information person that you are here for surgery. An escort to 3c will be provided. If you would not like an escort, please proceed to 3C on the 3rd floor. [phone]     What can I eat or drink?  -  You may have solid food or milk products until 8 hours prior to your surgery.  -  You may have water, apple juice or 7up/Sprite until 2 hours prior to your surgery.    Which medicines can I take?  -  Do NOT take Naproxen for 2-3 days before surgery.  No Ibuprofen x 24 hrs before surgery.      -  Please take these medications the day of surgery: all scheduled prescription mediciations      How do I prepare myself?  -  Take two showers: one the night before surgery; and one the morning of surgery.         Use Scrubcare or Hibiclens to wash from neck down.  You may use your own shampoo and conditioner. No other hair products.   -  Do NOT use lotion, powder, deodorant, or antiperspirant the day of your surgery.  -  Do NOT wear any makeup, fingernail polish or jewelry.  -  Begin using Incentive Spirometer 1 week prior to surgery.  Use 4 times per day, up to 5-10 breaths each time.  Bring Incentive Spirometer to hospital.  -Do not bring your own medications to the hospital, except for inhalers and eye drops.  -  Bring your ID and insurance card.    Questions or Concerns:  If you have questions or concerns, please call the  Preoperative Assessment Center, Monday-Friday 7AM-7PM:  [phone]        Using an Incentive Spirometer  Soon after your  surgery, a nurse or therapist will teach you breathing exercises. These keep your lungs clear, strengthen your breathing muscles, and help prevent complications.  The exercises include doing a deep-breathing exercise using a device called an incentive spirometer.  To do these exercises, you will breathe in through your mouth and not your nose. The incentive spirometer only works correctly if you breathe in through your mouth.  Four steps to clear lungs     Deep breathing expands the lungs, aids circulation, and helps prevent pneumonia.   1. Exhale normally.    Relax and breathe out.  2. Place your lips tightly around the mouthpiece.    Make sure the device is upright and not tilted.  3. Inhale as much air as you can through the mouthpiece (don't breath through your nose).    Inhale slowly and deeply.    Hold your breath long enough to keep the balls or disk raised for at least 3 seconds.    If you re inhaling too quickly, your device may make a tone. If you hear this tone, inhale more slowly.  4. Repeat the exercise regularly.    Do this exercise every hour while you're awake, or as your health care provider instructs.    You will also be taught coughing exercises and be asked to do them regularly on your own.    4381-5361 [employer]. [address] [place] [address], [place] [id number]. All rights reserved. This information is not intended as a substitute for professional medical care. Always follow your healthcare professional's instructions.          AFTER YOUR SURGERY  Breathing exercises   Breathing exercises help you recover faster. Take deep breaths and let the air out slowly. This will:     Help you wake up after surgery.    Help prevent complications like pneumonia.  Preventing complications will help you go home sooner.   We may give you a breathing device (incentive spirometer) to encourage you to breathe deeply.   Nausea and vomiting   You may feel sick to your stomach after surgery; if so, let your  nurse know.    Pain control:  After surgery, you may have pain. Our goal is to help you manage your pain. Pain medicine will help you feel comfortable enough to do activities that will help you heal.  These activities may include breathing exercises, walking and physical therapy.   To help your health care team treat your pain we will ask: 1) If you have pain  2) where it is located 3) describe your pain in your words  Methods of pain control include medications given by mouth, vein or by nerve block for some surgeries.  We may give you a pain control pump that will:  1) Deliver the medicine through a tube placed in your vein  2) Control the amount of medicine you receive  3) Allow you to push a button to deliver a dose of pain medicine  Sequential Compression Device (SCD) or Pneumo Boots:  You may need to wear SCD S on your legs or feet. These are wraps connected to a machine that pumps in air and releases it. The repeated pumping helps prevent blood clots from forming.

## 2017-03-28 NOTE — PROGRESS NOTES
VASCULAR SURGERY PROGRESS NOTE    HPI:  Mr. Rajan is a 61- year old male who was scheduled for endovascular aorto-bifemoral repair for treatment of a small abdominal aortic aneurysm and bilateral iliac occlusive disease today. Several concerns were noted pre-operatively, including antibodies in blood and leukocytosis.     SUBJECTIVE: Endorses cough, however notes does have a smoker's cough daily. Denies urinary symptoms. Otherwise feeling well.     OBJECTIVE:  Vital signs:  Temp: 97.9  F (36.6  C) Temp src: Oral BP: 105/77 Pulse: 67   Resp: 16 SpO2: 95 % O2 Device: None (Room air)      No intake or output data in the 24 hours ending 03/28/17 1033    Vitals:    03/28/17 0640   Weight: 107.3 kg (236 lb 8.9 oz)       PHYSICAL EXAM:  NEURO/PSYCH: The patient is alert and oriented.  Appropriate.  Moves all extremities.    SKIN: Color appropriate for race, warm, dry.  PULMONARY: Respirations easy and quiet, non-labored. Does not require supplemental oxygen. Does not appear distressed.       Current Outpatient Prescriptions   Medication Sig Dispense Refill     levofloxacin (LEVAQUIN) 500 MG tablet Take 1 tablet (500 mg) by mouth daily 7 tablet 0     3/28/2017 PORTABLE CHEST XRAY:  FINDINGS: Cardiac silhouette within normal limits. Right medial lung  base opacity, which appears new since 6/14/2016. No pleural effusion,  or pneumothorax.    ASSESSMENT:  #1 Lifestyle limiting claudication secondary to aorto-iliac occlusive disease  #2 Abdominal aortic aneurysm  - out of concern for leukocytosis and antibodies we will cancel surgery today  - Dr. Granados is attempting to secure OR space on Friday, March 31, 2017  - recommend WBC on Thursday, order entered and will verify if he can have it drawn in [place]  - PAC appointment today to ensure appropriate timing of blood work; had Type and Screen today    #3 Leukocytosis  #4 Right lung opacity, likely pneumonia  - urinalysis without nitrite or leukocytes  - collaboration with  Pharmacy regarding broad-spectrum antibiotic, Levaquin 500 mg by mouth daily x 7 days  - written prescription sent with patient with instruction to fill and begin ASAP    Please contact Dr. Granados's Vascular Surgery Service with questions or concerns.    DEANA Dennison, CNP  Division of Vascular Surgery  [facility]  Pager: [phone]  Office: [phone]
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
## 2017-03-28 NOTE — OR NURSING
Dr. Granados at bedside to speak with patient and wife.  Surgery cancelled for today because of patient's elevated WBC and unresolved URI.  CXR and UA ordered per Dr. Granados and after completed states they will come to see patient prior to discharge.  MAX Das RN

## 2017-03-28 NOTE — IP AVS SNAPSHOT
MRN:[id number]                      After Visit Summary   3/28/2017    Santos Rajan    MRN: [id number]           Thank you!     Thank you for choosing [facility] for your care. Our goal is always to provide you with excellent care. Hearing back from our patients is one way we can continue to improve our services. Please take a few minutes to complete the written survey that you may receive in the mail after you visit with us. Thank you!        Patient Information     Date Of Birth          1955        About your hospital stay     You were admitted on:  March 28, 2017 You last received care in the:  Same Day Surgery [facility]    You were discharged on:  March 28, 2017        Reason for your hospital stay       You were scheduled to have your aneurysm fixed, however it is being cancelled for today for the following reasons:  1. You blood shows antibodies and needs longer time for blood product availability  2. Your white blood cell count is elevated and could mean you have an infection                  Who to Call     For medical emergencies, please call 911.  For non-urgent questions about your medical care, please call your primary care provider or clinic, [phone]  For questions related to your surgery, please call your surgery clinic        Attending Provider     Provider Nelly Brower MD Vascular Surgery       Primary Care Provider Office Phone # Fax #    Roly Tomas PA-C [phone]       [facility] [address] [facility] [address]         When to contact your care team       Please call us if you have additional questions or concerns.    Dr. Nelly Granados MD  Vascular Surgery   [facility]  [address] [place] [address], [facility] [address] [place] [address] [id number] [phone]    Ashley Pemberton, Vascular Surgery Nurse  [phone]    Cecille Kinney APRN, CNP  Division of Vascular Surgery  [facility]  Pager:  [phone]  Office: [phone]    [facility]  [phone]                  After Care Instructions     Activity       Your activity upon discharge: activity as tolerated            Diet       Follow this diet upon discharge: general diet                  Follow-up Appointments     Adult [facility] Follow-up and recommended labs and tests       Tuesday, March 28, 2017, 2: 30 PM  Pre-Anesthesia Clinic, [facility]  - you will have a pre-anesthesia appointment  - they will coordinate timing of blood work if you need a repeat type and screen and repeat white blood cell count  - you may need additional blood work prior to surgery    Friday, March 31,2017, time [id number]  - Dr. Granados is attempting OR space for your surgery  - we will contact you with the details of surgery  - if she is unable to do surgery, you will likely be scheduled on April 18, 2017    Appointments on [place] and/or [facility] campus (with [facility] or [facility] provider or service). Call [phone] if you haven't heard regarding these appointments within 7 days of discharge.                  Your next 10 appointments already scheduled     Mar 28, 2017  2:30 PM CDT   (Arrive by 2:15 PM)   PAC RN ASSESSMENT with Daniel Pac Rn   [facility] Pre[facility] [place] ([facility] [place])    [address] [facility] [id number] [phone]            Mar 28, 2017  3:00 PM CDT   (Arrive by 2:45 PM)   PAC EVALUATION with Daniel Pac [place] [facility]   [facility] ([facility] [place])    [address]  [address] [facility] [id number] [phone]            Mar 28, 2017  4:10 PM CDT   (Arrive by 3:55 PM)   PAC Anesthesia Consult with Daniel Pac Anesthesiologist   [facility] [place] ([facility] [place])    [address]  [address] [facility] [id number] [phone]            Mar 31,  "2017   Procedure with Nelly Granados MD   [facility], [place], Same Day Surgery (--)    [address] [facility] [place] [address] [phone]            Apr 18, 2017   Procedure with Nelly Granados MD   [facility], [place], Same Day Surgery (--)    [address] [facility] [place] [address]   [phone]              Pending Results     Date and Time Order Name Status Description    3/28/2017 0852 UA with Microscopic reflex to Culture In process             Statement of Approval     Ordered          03/28/17 0954  I have reviewed and agree with all the recommendations and orders detailed in this document.  EFFECTIVE NOW     Approved and electronically signed by:  Cecille Kinney APRN CNP             Admission Information     Date & Time Provider Department Dept. Phone    3/28/2017 Nelly Granados MD Same Day Surgery [facility] [place] [phone]      Your Vitals Were     Blood Pressure Pulse Temperature Respirations Height Weight    105/77 (BP Location: Right arm) 67 97.9  F (36.6  C) (Oral) 16 1.778 m (5' 10\") 107.3 kg (236 lb 8.9 oz)    Pulse Oximetry BMI (Body Mass Index)                95% 33.94 kg/m2          [facility] Information     [facility] gives you secure access to your electronic health record. If you see a primary care provider, you can also send messages to your care team and make appointments. If you have questions, please call your primary care clinic.  If you do not have a primary care provider, please call [phone] and they will assist you.        Care EveryWhere ID     This is your Care EveryWhere ID. This could be used by other organizations to access your [place] medical records  [id number]           Review of your medicines      START taking        Dose / Directions    levofloxacin 500 MG tablet   Commonly known as:  LEVAQUIN   Used for:  Leukocytosis, unspecified type        Dose:  500 mg   Take 1 tablet (500 mg) by mouth daily   Quantity:  7 tablet   Refills:  0         CONTINUE these " medicines which may have CHANGED, or have new prescriptions. If we are uncertain of the size of tablets/capsules you have at home, strength may be listed as something that might have changed.        Dose / Directions    gabapentin 100 MG capsule   Commonly known as:  NEURONTIN   This may have changed:  additional instructions   Used for:  Lumbar disc disorder        Dose:  100 mg   Take 1 capsule (100 mg) by mouth 3 times daily   Quantity:  60 capsule   Refills:  0         CONTINUE these medicines which have NOT CHANGED        Dose / Directions    ADVIL COLD/SINUS PO        Dose:  1 tablet   Take 1 tablet by mouth 3 times daily as needed (for cold)   Refills:  0       amLODIPine-benazepril 10-40 MG per capsule   Commonly known as:  LOTREL   Used for:  Benign essential hypertension        Dose:  1 capsule   Take 1 capsule by mouth daily   Quantity:  90 capsule   Refills:  1       aspirin 81 MG EC tablet   Used for:  Coronary atherosclerosis due to lipid rich plaque        Dose:  81 mg   Take 1 tablet (81 mg) by mouth daily   Quantity:  60 tablet   Refills:  3       hydrocortisone 2.5 % cream        APPLY TO FACE TOPICALLY BID   Refills:  3       IBUPROFEN PO        Dose:  400 mg   Take 400 mg by mouth every 6 hours as needed for moderate pain (Takes 400-6-800 mg as needed)   Refills:  0       metoprolol 25 MG 24 hr tablet   Commonly known as:  TOPROL-XL   Used for:  Coronary atherosclerosis due to lipid rich plaque        Dose:  25 mg   Take 1 tablet (25 mg) by mouth daily   Quantity:  30 tablet   Refills:  3       naproxen 500 MG tablet   Commonly known as:  NAPROSYN   Used for:  Pain of left sacroiliac joint, Lumbar degenerative disc disease        Dose:  500 mg   Take 1 tablet (500 mg) by mouth 2 times daily (with meals)   Quantity:  40 tablet   Refills:  1       nicotine 21 MG/24HR 24 hr patch   Commonly known as:  NICODERM CQ   Used for:  Tobacco abuse        Dose:  1 patch   Place 1 patch onto the skin every 24  hours   Quantity:  15 patch   Refills:  0       rosuvastatin 10 MG tablet   Commonly known as:  CRESTOR   Used for:  PVD (peripheral vascular disease) (H)        Dose:  10 mg   Take 1 tablet (10 mg) by mouth daily   Quantity:  30 tablet   Refills:  1       scopolamine 72 hr patch   Commonly known as:  TRANSDERM   Used for:  H/O motion sickness        Place 1 patch onto the skin every 72 hours.  Apply to hairless area behind one ear at least 4 hours before travel.  Remove old patch and change every 3 days.   Quantity:  10 patch   Refills:  1       tadalafil 20 MG tablet   Commonly known as:  CIALIS   Used for:  Other male erectile dysfunction        Dose:  10-20 mg   Take 0.5-1 tablets (10-20 mg) by mouth daily as needed for erectile dysfunction Never use with nitroglycerin, terazosin or doxazosin.   Quantity:  6 tablet   Refills:  11       tiZANidine 4 MG tablet   Commonly known as:  ZANAFLEX   Used for:  Bursitis of left hip, Lumbar herniated disc        Dose:  4-8 mg   Take 1-2 tablets (4-8 mg) by mouth nightly as needed for muscle spasms   Quantity:  30 tablet   Refills:  1       traMADol 50 MG tablet   Commonly known as:  ULTRAM   Used for:  Hip pain, left        Dose:   mg   Take 1-2 tablets ( mg) by mouth every 6 hours as needed for pain   Quantity:  30 tablet   Refills:  0            Where to get your medicines      Some of these will need a paper prescription and others can be bought over the counter. Ask your nurse if you have questions.     Bring a paper prescription for each of these medications     levofloxacin 500 MG tablet                Protect others around you: Learn how to safely use, store and throw away your medicines at www.disposemymeds.org.             Medication List: This is a list of all your medications and when to take them. Check marks below indicate your daily home schedule. Keep this list as a reference.      Medications           Morning Afternoon Evening Bedtime As Needed     ADVIL COLD/SINUS PO   Take 1 tablet by mouth 3 times daily as needed (for cold)                                amLODIPine-benazepril 10-40 MG per capsule   Commonly known as:  LOTREL   Take 1 capsule by mouth daily                                aspirin 81 MG EC tablet   Take 1 tablet (81 mg) by mouth daily                                gabapentin 100 MG capsule   Commonly known as:  NEURONTIN   Take 1 capsule (100 mg) by mouth 3 times daily                                hydrocortisone 2.5 % cream   APPLY TO FACE TOPICALLY BID                                IBUPROFEN PO   Take 400 mg by mouth every 6 hours as needed for moderate pain (Takes 400-6-800 mg as needed)                                levofloxacin 500 MG tablet   Commonly known as:  LEVAQUIN   Take 1 tablet (500 mg) by mouth daily                                metoprolol 25 MG 24 hr tablet   Commonly known as:  TOPROL-XL   Take 1 tablet (25 mg) by mouth daily                                naproxen 500 MG tablet   Commonly known as:  NAPROSYN   Take 1 tablet (500 mg) by mouth 2 times daily (with meals)                                nicotine 21 MG/24HR 24 hr patch   Commonly known as:  NICODERM CQ   Place 1 patch onto the skin every 24 hours                                rosuvastatin 10 MG tablet   Commonly known as:  CRESTOR   Take 1 tablet (10 mg) by mouth daily                                scopolamine 72 hr patch   Commonly known as:  TRANSDERM   Place 1 patch onto the skin every 72 hours.  Apply to hairless area behind one ear at least 4 hours before travel.  Remove old patch and change every 3 days.                                tadalafil 20 MG tablet   Commonly known as:  CIALIS   Take 0.5-1 tablets (10-20 mg) by mouth daily as needed for erectile dysfunction Never use with nitroglycerin, terazosin or doxazosin.                                tiZANidine 4 MG tablet   Commonly known as:  ZANAFLEX   Take 1-2 tablets (4-8 mg) by mouth nightly  as needed for muscle spasms                                traMADol 50 MG tablet   Commonly known as:  ULTRAM   Take 1-2 tablets ( mg) by mouth every 6 hours as needed for pain

## 2017-03-30 ENCOUNTER — MYC MEDICAL ADVICE (OUTPATIENT)
Dept: OTHER | Facility: CLINIC | Age: 62
End: 2017-03-30

## 2017-03-30 DIAGNOSIS — I71.9 AORTIC ANEURYSM WITHOUT RUPTURE, UNSPECIFIED PORTION OF AORTA (H): ICD-10-CM

## 2017-03-30 DIAGNOSIS — Z86.2 HISTORY OF LEUKOCYTOSIS: ICD-10-CM

## 2017-03-30 DIAGNOSIS — R76.8 RED BLOOD CELL ANTIBODY POSITIVE: ICD-10-CM

## 2017-03-30 DIAGNOSIS — D72.829 LEUKOCYTOSIS, UNSPECIFIED TYPE: Primary | ICD-10-CM

## 2017-03-30 LAB
ABO + RH BLD: ABNORMAL
ABO + RH BLD: ABNORMAL
BLD GP AB INVEST PLASRBC-IMP: ABNORMAL
BLD GP AB SCN SERPL QL: ABNORMAL
BLD PROD TYP BPU: ABNORMAL
BLOOD BANK CMNT PATIENT-IMP: ABNORMAL
NUM BPU REQUESTED: 6
SPECIMEN EXP DATE BLD: ABNORMAL
WBC # BLD AUTO: 16 10E9/L (ref 4–11)

## 2017-03-30 PROCEDURE — 85048 AUTOMATED LEUKOCYTE COUNT: CPT | Performed by: CLINICAL NURSE SPECIALIST

## 2017-03-30 PROCEDURE — 36415 COLL VENOUS BLD VENIPUNCTURE: CPT | Performed by: CLINICAL NURSE SPECIALIST

## 2017-03-30 NOTE — TELEPHONE ENCOUNTER
WBC today 16.0. Dr. Granados requesting to postpone surgery scheduled for 3/31/2017 to 4/18/2017.    Plan:  1. Finish antibiotics prescribed 3/28/2017  2. Have WBC drawn either 4/6/2017 or 4/7/2017 at [facility]. I will monitor.   3. Appointment 4/17/2017 in Dr. Granados's Clinic, he will be called to confirm time.  4. WBC and Type and Screen 4/17/2017, will have one-unit available for surgery 4/18/2017. Have labs drawn approximately 30 minutes prior to appointment.  5. 4/18/2017 planning for surgery, time to be determined and he will be contacted with time.    DEANA Dennison, CNP  Division of Vascular Surgery  [facility]  Pager: [phone]  Office: [phone]

## 2017-03-31 ENCOUNTER — TELEPHONE (OUTPATIENT)
Dept: VASCULAR SURGERY | Facility: CLINIC | Age: 62
End: 2017-03-31

## 2017-03-31 NOTE — TELEPHONE ENCOUNTER
Per task, pt needs to come in for a appt to see Cecille NP and surgery. Talked with pt and offered 4/17 at 1 with Cecille and surgery the following day 4/18 starting at 8 with a 6am check in. Pt ok with that. Will mail letter

## 2017-04-01 LAB
BLD PROD TYP BPU: NORMAL
BLD UNIT ID BPU: 0
BLOOD PRODUCT CODE: NORMAL
BPU ID: NORMAL
TRANSFUSION STATUS PATIENT QL: NORMAL

## 2017-04-03 ASSESSMENT — ENCOUNTER SYMPTOMS
ORTHOPNEA: 0
POSTURAL DYSPNEA: 1
HEMOPTYSIS: 0
TACHYCARDIA: 1
BACK PAIN: 1
NECK PAIN: 1
INCREASED ENERGY: 0
ALTERED TEMPERATURE REGULATION: 0
COUGH DISTURBING SLEEP: 1
SMELL DISTURBANCE: 0
WEIGHT GAIN: 0
CHILLS: 0
HYPOTENSION: 0
HOARSE VOICE: 0
COUGH: 1
MUSCLE WEAKNESS: 1
TREMORS: 0
HEADACHES: 0
MEMORY LOSS: 0
LEG PAIN: 1
LOSS OF CONSCIOUSNESS: 0
SINUS PAIN: 0
RESPIRATORY PAIN: 0
SNORES LOUDLY: 0
FATIGUE: 1
JOINT SWELLING: 0
PALPITATIONS: 0
DISTURBANCES IN COORDINATION: 0
SHORTNESS OF BREATH: 0
SORE THROAT: 0
SEIZURES: 0
NUMBNESS: 1
MUSCLE CRAMPS: 1
SPEECH CHANGE: 0
DYSPNEA ON EXERTION: 1
WEAKNESS: 1
TASTE DISTURBANCE: 0
POLYDIPSIA: 0
ARTHRALGIAS: 1
DIZZINESS: 0
CLAUDICATION: 1
HYPERTENSION: 1
LEG SWELLING: 0
WHEEZING: 1
BRUISES/BLEEDS EASILY: 1
MYALGIAS: 1
SPUTUM PRODUCTION: 1
FEVER: 0
SINUS CONGESTION: 1
WEIGHT LOSS: 0
STIFFNESS: 1
NIGHT SWEATS: 1
TINGLING: 1
DECREASED APPETITE: 0
EXERCISE INTOLERANCE: 1
HALLUCINATIONS: 0
NECK MASS: 0
PARALYSIS: 0
POLYPHAGIA: 0
SLEEP DISTURBANCES DUE TO BREATHING: 0
LIGHT-HEADEDNESS: 0
SWOLLEN GLANDS: 0
SYNCOPE: 0
TROUBLE SWALLOWING: 0

## 2017-04-04 ENCOUNTER — TELEPHONE (OUTPATIENT)
Dept: OTHER | Facility: CLINIC | Age: 62
End: 2017-04-04

## 2017-04-04 DIAGNOSIS — D72.829 LEUKOCYTOSIS, UNSPECIFIED TYPE: Primary | ICD-10-CM

## 2017-04-04 RX ORDER — LEVOFLOXACIN 500 MG/1
500 TABLET, FILM COATED ORAL DAILY
Qty: 7 TABLET | Refills: 0 | Status: SHIPPED | OUTPATIENT
Start: 2017-04-04 | End: 2017-04-11

## 2017-04-04 NOTE — TELEPHONE ENCOUNTER
Call received from Ambrose Mohini this morning. He endorses feeling improved after 7- days of Levaquin, however, still feels some chest congestion. It is reasonable to do an additional 7- days of Levaquin and check WBC next week. Levaquin e-prescribed to [facility], [place], [place]. He will wait have WBC drawn next Tuesday or Wednesday.    DEANA Dennison, CNP  Division of Vascular Surgery  [facility]  Pager: [phone]

## 2017-04-10 PROBLEM — G89.29 CHRONIC BILATERAL LOW BACK PAIN WITH LEFT-SIDED SCIATICA: Status: RESOLVED | Noted: 2017-02-01 | Resolved: 2017-04-10

## 2017-04-10 PROBLEM — M54.42 CHRONIC BILATERAL LOW BACK PAIN WITH LEFT-SIDED SCIATICA: Status: RESOLVED | Noted: 2017-02-01 | Resolved: 2017-04-10

## 2017-04-10 PROBLEM — M25.552 HIP PAIN, LEFT: Status: RESOLVED | Noted: 2017-02-01 | Resolved: 2017-04-10

## 2017-04-10 NOTE — PROGRESS NOTES
Subjective:    HPI                    Objective:    System    Physical Exam    General     ROS    Assessment/Plan:      DISCHARGE REPORT    Progress reporting period is from 2/1/17 to 4/10/17.       SUBJECTIVE  Patient reports lumbar extension exercises did not seem to do anything. Does not feel any worse or better.     Current Pain level: 7/10.     Initial Pain level: 7/10.   Changes in function:  None  Adverse reaction to treatment or activity: None    OBJECTIVE  Lumbar flexion hands to ankle no pain. Lumbar extension moderate limitation no pain. No symptom production with repeated movements. unable to ride bike for 3 min do to signficant cramping in anterior thigh. Better with seated rest. Knee extension 4/5 on left, hip flexion 4-/5, hip abduction 3+/5 on left. Potential vascular restriction.      ASSESSMENT/PLAN  Updated problem list and treatment plan: Diagnosis 1:  Lumbar and left side hip complaints  Pain -  home program  Decreased strength - home program  Impaired gait - home program  Decreased function - home program  STG/LTGs have been met or progress has been made towards goals:  None  Assessment of Progress: Patient pain appears to be a vascular issue. Checking up with vascular MD.  Self Management Plans:  Discharge till vascular component treated or ruled out.   I have re-evaluated this patient and find that the nature, scope, duration and intensity of the therapy is appropriate for the medical condition of the patient.  Santos continues to require the following intervention to meet STG and LTG's:  PT intervention not appropriate.     Recommendations:  This patient would benefit from further evaluation.    Please refer to the daily flowsheet for treatment today, total treatment time and time spent performing 1:1 timed codes.

## 2017-04-11 ENCOUNTER — RADIANT APPOINTMENT (OUTPATIENT)
Dept: GENERAL RADIOLOGY | Facility: CLINIC | Age: 62
End: 2017-04-11
Attending: PHYSICIAN ASSISTANT
Payer: COMMERCIAL

## 2017-04-11 ENCOUNTER — OFFICE VISIT (OUTPATIENT)
Dept: FAMILY MEDICINE | Facility: CLINIC | Age: 62
End: 2017-04-11
Payer: COMMERCIAL

## 2017-04-11 VITALS
DIASTOLIC BLOOD PRESSURE: 63 MMHG | BODY MASS INDEX: 34.56 KG/M2 | HEIGHT: 70 IN | SYSTOLIC BLOOD PRESSURE: 111 MMHG | WEIGHT: 241.4 LBS | HEART RATE: 77 BPM | TEMPERATURE: 98.1 F | OXYGEN SATURATION: 97 %

## 2017-04-11 DIAGNOSIS — R05.9 COUGH: ICD-10-CM

## 2017-04-11 DIAGNOSIS — J20.9 ACUTE BRONCHITIS, UNSPECIFIED ORGANISM: ICD-10-CM

## 2017-04-11 DIAGNOSIS — R05.9 COUGH: Primary | ICD-10-CM

## 2017-04-11 DIAGNOSIS — D72.829 LEUKOCYTOSIS, UNSPECIFIED TYPE: ICD-10-CM

## 2017-04-11 LAB
CRP SERPL-MCNC: 8.9 MG/L (ref 0–8)
ERYTHROCYTE [DISTWIDTH] IN BLOOD BY AUTOMATED COUNT: 14.1 % (ref 10–15)
HCT VFR BLD AUTO: 51.6 % (ref 40–53)
HGB BLD-MCNC: 16.4 G/DL (ref 13.3–17.7)
MCH RBC QN AUTO: 30.9 PG (ref 26.5–33)
MCHC RBC AUTO-ENTMCNC: 31.8 G/DL (ref 31.5–36.5)
MCV RBC AUTO: 97 FL (ref 78–100)
PLATELET # BLD AUTO: 183 10E9/L (ref 150–450)
RBC # BLD AUTO: 5.31 10E12/L (ref 4.4–5.9)
WBC # BLD AUTO: 12 10E9/L (ref 4–11)

## 2017-04-11 PROCEDURE — 85027 COMPLETE CBC AUTOMATED: CPT | Performed by: PHYSICIAN ASSISTANT

## 2017-04-11 PROCEDURE — 71020 XR CHEST 2 VW: CPT

## 2017-04-11 PROCEDURE — 70210 X-RAY EXAM OF SINUSES: CPT

## 2017-04-11 PROCEDURE — 36415 COLL VENOUS BLD VENIPUNCTURE: CPT | Performed by: PHYSICIAN ASSISTANT

## 2017-04-11 PROCEDURE — 99214 OFFICE O/P EST MOD 30 MIN: CPT | Performed by: PHYSICIAN ASSISTANT

## 2017-04-11 PROCEDURE — 86140 C-REACTIVE PROTEIN: CPT | Performed by: PHYSICIAN ASSISTANT

## 2017-04-11 RX ORDER — LEVOFLOXACIN 500 MG/1
500 TABLET, FILM COATED ORAL DAILY
Qty: 5 TABLET | Refills: 0 | Status: ON HOLD | OUTPATIENT
Start: 2017-04-11 | End: 2017-04-19

## 2017-04-11 RX ORDER — BENZONATATE 200 MG/1
200 CAPSULE ORAL 3 TIMES DAILY PRN
Qty: 30 CAPSULE | Refills: 0 | Status: SHIPPED | OUTPATIENT
Start: 2017-04-11 | End: 2017-06-09

## 2017-04-11 RX ORDER — ALBUTEROL SULFATE 90 UG/1
2 AEROSOL, METERED RESPIRATORY (INHALATION) EVERY 6 HOURS PRN
Qty: 1 INHALER | Refills: 1 | Status: SHIPPED | OUTPATIENT
Start: 2017-04-11 | End: 2017-06-09

## 2017-04-11 ASSESSMENT — ANXIETY QUESTIONNAIRES
5. BEING SO RESTLESS THAT IT IS HARD TO SIT STILL: NOT AT ALL
GAD7 TOTAL SCORE: 0
1. FEELING NERVOUS, ANXIOUS, OR ON EDGE: NOT AT ALL
IF YOU CHECKED OFF ANY PROBLEMS ON THIS QUESTIONNAIRE, HOW DIFFICULT HAVE THESE PROBLEMS MADE IT FOR YOU TO DO YOUR WORK, TAKE CARE OF THINGS AT HOME, OR GET ALONG WITH OTHER PEOPLE: NOT DIFFICULT AT ALL
7. FEELING AFRAID AS IF SOMETHING AWFUL MIGHT HAPPEN: NOT AT ALL
6. BECOMING EASILY ANNOYED OR IRRITABLE: NOT AT ALL
3. WORRYING TOO MUCH ABOUT DIFFERENT THINGS: NOT AT ALL
2. NOT BEING ABLE TO STOP OR CONTROL WORRYING: NOT AT ALL

## 2017-04-11 ASSESSMENT — PATIENT HEALTH QUESTIONNAIRE - PHQ9: 5. POOR APPETITE OR OVEREATING: NOT AT ALL

## 2017-04-11 NOTE — NURSING NOTE
"Chief Complaint   Patient presents with     URI       Initial /63  Pulse 77  Temp 98.1  F (36.7  C) (Tympanic)  Ht 5' 10\" (1.778 m)  Wt 241 lb 6.4 oz (109.5 kg)  SpO2 97%  BMI 34.64 kg/m2 Estimated body mass index is 34.64 kg/(m^2) as calculated from the following:    Height as of this encounter: 5' 10\" (1.778 m).    Weight as of this encounter: 241 lb 6.4 oz (109.5 kg).  Medication Reconciliation: complete     Chelsea Daley MA      "

## 2017-04-11 NOTE — MR AVS SNAPSHOT
After Visit Summary   4/11/2017    Santos Rajan    MRN: [id number]           Patient Information     Date Of Birth          1955        Visit Information        Provider Department      4/11/2017 1:00 PM Roly Tomas PA-C [facility]        Today's Diagnoses     Cough    -  1    Leukocytosis, unspecified type        Acute bronchitis, unspecified organism          Care Instructions    Robitussin DM for cough  Fluids            Follow-ups after your visit        Your next 10 appointments already scheduled     Apr 17, 2017  1:00 PM CDT   (Arrive by 12:45 PM)   New Vascular Visit with DEANA Sanchez [facility] ([facility])    [address]  3rd Floor  [facility] [id number] [phone]            Apr 18, 2017   Procedure with Nelly Granados MD   [facility], Same Day Surgery (--)    [address] [id number] [phone]            Apr 18, 2017  8:00 PM CDT   IR OR ANGIOGRAM with UUIROR1   [facility], Interventional Radiology ([facility] [place])    [address] [id number] [phone]           1. Your doctor will need to do a history and physical within 30 days before this procedure. 2. Your doctor will determine whether you need a 12 lead EKG, as well as which medications should not be taken the morning of the exam. 3. Laboratory tests are to be obtained by your doctor prior to the exam (creatinine, Hgb/Hct, platelet count, and INR) 4. If you have allergies to x-ray contrast or iodine, contact your doctor or a Radiology nurse prior to the exam day for instructions. 5. Someone will need to drive you to and from the hospital. 6. Bring a list of all drugs you are taking; include supplements and over-the-counter medications. 7. Wear comfortable clothes and leave your valuables at home. 8. If you are or may be pregnant,  contact your doctor or a Radiology nurse prior to the day of the exam. 9.  If you have diabetes, check with your doctor or a Radiology nurse to see if your insulin needs to be adjusted for the exam. 10. If you are taking Coumadin (to thin you blood) please contact your doctor or a Radiology nurse at least 5 days before the exam for special instructions. 11. You should not have received barium (x-ray contrast) within 48 hours of this exam. 12. The day before your exam you may eat your regular diet and are encouraged to drink at least 2 quarts of clear liquids. Drink no alcoholic beverages for 24 hours prior to the exam. 13. If you have a colostomy you will need to irrigate it with tap water at 8PM the evening before and again at 6AM the morning of the exam. 14. Do not smoke for 24 hours prior to the procedure. 15. Do not eat any solid food or milk products for 6 hours prior to the exam. You may drink clear liquids until 2 hours prior to the exam. Clear liquids include the following: water, Jell-O, clear broth, apple juice or any non-carbonated drink that you can see through (no pop!) 16. The morning of the exam you may brush your teeth and take medications as directed with a sip of water. 17. Tell the Radiology nurse if you have any allergies. 18. You will be asked to empty your bladder before the exam begins. 19. Following the exam you will need to remain on complete bedrest for 4-6 hours. The nurse will monitor your vital signs, puncture site, and the pulses and temperature of the arm or leg that was punctured. 20. When discharged, you cannot drive until morning, and an adult must be with you until then. You should stay in the [place] area overnight.              Future tests that were ordered for you today     Open Future Orders        Priority Expected Expires Ordered    CBC with platelets Routine  4/11/2018 4/11/2017            Who to contact     Normal or non-critical lab and imaging results will be  "communicated to you by [employer]hart, letter or phone within 4 business days after the clinic has received the results. If you do not hear from us within 7 days, please contact the clinic through [employer] or phone. If you have a critical or abnormal lab result, we will notify you by phone as soon as possible.  Submit refill requests through [employer] or call your pharmacy and they will forward the refill request to us. Please allow 3 business days for your refill to be completed.          If you need to speak with a  for additional information , please call: [phone]             Additional Information About Your Visit        [employer] Information     [employer] gives you secure access to your electronic health record. If you see a primary care provider, you can also send messages to your care team and make appointments. If you have questions, please call your primary care clinic.  If you do not have a primary care provider, please call [phone] and they will assist you.        Care EveryWhere ID     This is your Care EveryWhere ID. This could be used by other organizations to access your [place] medical records  [id number]        Your Vitals Were     Pulse Temperature Height Pulse Oximetry BMI (Body Mass Index)       77 98.1  F (36.7  C) (Tympanic) 5' 10\" (1.778 m) 97% 34.64 kg/m2        Blood Pressure from Last 3 Encounters:   04/11/17 111/63   03/28/17 104/72   03/28/17 105/77    Weight from Last 3 Encounters:   04/11/17 241 lb 6.4 oz (109.5 kg)   03/28/17 236 lb (107 kg)   03/28/17 236 lb 8.9 oz (107.3 kg)              We Performed the Following     CBC with platelets     CRP inflammation     XR Chest 2 Views          Today's Medication Changes          These changes are accurate as of: 4/11/17  2:14 PM.  If you have any questions, ask your nurse or doctor.               Start taking these medicines.        Dose/Directions    albuterol 108 (90 BASE) MCG/ACT Inhaler   Commonly known as:  PROAIR " HFA/PROVENTIL HFA/VENTOLIN HFA   Used for:  Acute bronchitis, unspecified organism   Started by:  Roly Tomas PA-C        Dose:  2 puff   Inhale 2 puffs into the lungs every 6 hours as needed for shortness of breath / dyspnea or wheezing   Quantity:  1 Inhaler   Refills:  1       benzonatate 200 MG capsule   Commonly known as:  TESSALON   Used for:  Leukocytosis, unspecified type   Started by:  Roly Tomas PA-C        Dose:  200 mg   Take 1 capsule (200 mg) by mouth 3 times daily as needed for cough   Quantity:  30 capsule   Refills:  0         These medicines have changed or have updated prescriptions.        Dose/Directions    amLODIPine-benazepril 10-40 MG per capsule   Commonly known as:  LOTREL   This may have changed:  when to take this   Used for:  Benign essential hypertension        Dose:  1 capsule   Take 1 capsule by mouth daily   Quantity:  90 capsule   Refills:  1       aspirin 81 MG EC tablet   This may have changed:  when to take this   Used for:  Coronary atherosclerosis due to lipid rich plaque        Dose:  81 mg   Take 1 tablet (81 mg) by mouth daily   Quantity:  60 tablet   Refills:  3       gabapentin 100 MG capsule   Commonly known as:  NEURONTIN   This may have changed:    - when to take this  - reasons to take this   Used for:  Lumbar disc disorder        Dose:  100 mg   Take 1 capsule (100 mg) by mouth 3 times daily   Quantity:  60 capsule   Refills:  0       metoprolol 25 MG 24 hr tablet   Commonly known as:  TOPROL-XL   This may have changed:  when to take this   Used for:  Coronary atherosclerosis due to lipid rich plaque        Dose:  25 mg   Take 1 tablet (25 mg) by mouth daily   Quantity:  30 tablet   Refills:  3            Where to get your medicines      These medications were sent to [facility] - [address]  [address] [address]     Phone:  [phone]     albuterol 108 (90 BASE) MCG/ACT Inhaler     benzonatate 200 MG capsule    levofloxacin 500 MG tablet                Primary Care Provider Office Phone # Fax #    Roly Tomas PA-C [phone]       [facility] [address] [facility] [address]        Thank you!     Thank you for choosing [facility]  for your care. Our goal is always to provide you with excellent care. Hearing back from our patients is one way we can continue to improve our services. Please take a few minutes to complete the written survey that you may receive in the mail after your visit with us. Thank you!             Your Updated Medication List - Protect others around you: Learn how to safely use, store and throw away your medicines at www.disposemymeds.org.          This list is accurate as of: 4/11/17  2:14 PM.  Always use your most recent med list.                   Brand Name Dispense Instructions for use    albuterol 108 (90 BASE) MCG/ACT Inhaler    PROAIR HFA/PROVENTIL HFA/VENTOLIN HFA    1 Inhaler    Inhale 2 puffs into the lungs every 6 hours as needed for shortness of breath / dyspnea or wheezing       amLODIPine-benazepril 10-40 MG per capsule    LOTREL    90 capsule    Take 1 capsule by mouth daily       aspirin 81 MG EC tablet     60 tablet    Take 1 tablet (81 mg) by mouth daily       benzonatate 200 MG capsule    TESSALON    30 capsule    Take 1 capsule (200 mg) by mouth 3 times daily as needed for cough       gabapentin 100 MG capsule    NEURONTIN    60 capsule    Take 1 capsule (100 mg) by mouth 3 times daily       IBUPROFEN PO      Take 400 mg by mouth every 6 hours as needed for moderate pain (Takes 400-6-800 mg as needed) Reported on 4/11/2017       levofloxacin 500 MG tablet    LEVAQUIN    5 tablet    Take 1 tablet (500 mg) by mouth daily       metoprolol 25 MG 24 hr tablet    TOPROL-XL    30 tablet    Take 1 tablet (25 mg) by mouth daily       naproxen 500 MG tablet    NAPROSYN    40 tablet    Take 1 tablet (500 mg) by mouth 2  times daily (with meals)       nicotine 21 MG/24HR 24 hr patch    NICODERM CQ    15 patch    Place 1 patch onto the skin every 24 hours       traMADol 50 MG tablet    ULTRAM    30 tablet    Take 1-2 tablets ( mg) by mouth every 6 hours as needed for pain

## 2017-04-11 NOTE — PROGRESS NOTES
SUBJECTIVE:  Santos Rajan is a 61 year old male who presents with the following concerns;              Symptoms: cc Present Absent Comment   Fever/Chills   x    Fatigue  x     Muscle Aches  x     Eye Irritation   x    Sneezing   x    Nasal Saurabh/Drg  x     Sinus Pressure/Pain   x    Loss of smell   x    Dental pain   x    Sore Throat   x    Swollen Glands   x    Ear Pain/Fullness  x  Right ear fullness   Cough  x     Wheeze  x     Chest Pain  x  Chest tightness    Shortness of breath  x     Rash   x    Other   x      Symptom duration:  x5 weeks   Sympom severity:  no improvements of symptoms. Needing to repeat Red blood cell count.   Surgery was canceled due to illness.     Treatments tried: x2 rounds of antibipotics for possible pneumonia, xray done 3/28    Contacts:  wife was sick with same symptoms        Medications updated and reviewed.  Past, family and surgical history is updated and reviewed in the record.  Patient Active Problem List    Diagnosis Date Noted     Status post coronary angiogram 03/21/2017     Priority: Medium     Diverticulosis of large intestine without hemorrhage 07/23/2016     Priority: Medium     Benign essential hypertension 06/14/2016     Priority: Medium     Advanced directives, counseling/discussion 09/17/2015     Priority: Medium     Advance Care Planning 9/17/2015: ACP Review and Resources Provided:  Reviewed chart for advance care plan.  Santos Rajan has no plan or code status on file.  available resources provided with information.  code status current choice pending further ACP discussions.   Added by CHARLA DOUGLAS           Impaired fasting glucose 09/01/2015     Priority: Medium     Low testosterone 09/01/2015     Priority: Medium     Fatty liver 09/01/2015     Priority: Medium     Umbilical hernia 09/01/2015     Priority: Medium     Colon polyps 09/01/2015     Priority: Medium     On Colonoscopy 11/9/2011 - [facility]       CARDIOVASCULAR SCREENING; LDL  GOAL LESS THAN 160 08/06/2015     Priority: Medium     Past Medical History:   Diagnosis Date     AAA (abdominal aortic aneurysm) (H)      Aortoiliac occlusive disease (H)      Arthritis 1995    knee and hands     Benign essential hypertension 6/14/2016     Brain tumor (benign) (H)     Being monitored, Lizbeth      Diverticula of colon     No surgery      Red blood cell antibody positive     Anti-Peabody     Umbilical hernia 9/1/2015      Family History   Problem Relation Age of Onset     CANCER Mother      kidney     Other Cancer Mother      kidney     Aneurysm Father      Other Cancer Brother      lieukemia     CANCER Brother      Leukemia      Sleep Apnea Brother      Coronary Artery Disease No family hx of      DIABETES No family hx of      ROS:  Other than noted above, general, HEENT, respiratory, cardiac and gastrointestinal systems are negative.  OBJECTIVE:  ENT exam reveals - bilateral TM fluid noted, neck without nodes, throat normal without erythema or exudate, sinuses nontender, post nasal drip noted, nasal mucosa congested and nasal mucosa pale and congested.  CHEST:chest clear to IPPA, no tachypnea, retractions or cyanosis and S1, S2 normal, no murmur, no gallop, rate regular.  No edema  The abdomen is soft without tenderness, guarding, mass, rebound or organomegaly. Bowel sounds are normal. No CVA tenderness or inguinal adenopathy noted.    Santos was seen today for uri.    Diagnoses and all orders for this visit:    Cough  -     XR Chest 2 Views  -     XR Sinus 1/2 Views; Future    Leukocytosis, unspecified type  -     CBC with platelets  -     CRP inflammation  -     CBC with platelets; Future  -     benzonatate (TESSALON) 200 MG capsule; Take 1 capsule (200 mg) by mouth 3 times daily as needed for cough  -     levofloxacin (LEVAQUIN) 500 MG tablet; Take 1 tablet (500 mg) by mouth daily    Acute bronchitis, unspecified organism  -     albuterol (PROAIR HFA/PROVENTIL HFA/VENTOLIN HFA) 108 (90 BASE)  MCG/ACT Inhaler; Inhale 2 puffs into the lungs every 6 hours as needed for shortness of breath / dyspnea or wheezing      Advised supportive and symptomatic treatment.  Follow up with Provider - if condition persists or worsens.   Recheck cbc in 3 days.

## 2017-04-12 ASSESSMENT — PATIENT HEALTH QUESTIONNAIRE - PHQ9: SUM OF ALL RESPONSES TO PHQ QUESTIONS 1-9: 2

## 2017-04-12 ASSESSMENT — ANXIETY QUESTIONNAIRES: GAD7 TOTAL SCORE: 0

## 2017-04-14 DIAGNOSIS — D72.829 LEUKOCYTOSIS, UNSPECIFIED TYPE: ICD-10-CM

## 2017-04-14 LAB
ERYTHROCYTE [DISTWIDTH] IN BLOOD BY AUTOMATED COUNT: 14.5 % (ref 10–15)
HCT VFR BLD AUTO: 48.5 % (ref 40–53)
HGB BLD-MCNC: 15.2 G/DL (ref 13.3–17.7)
MCH RBC QN AUTO: 30.6 PG (ref 26.5–33)
MCHC RBC AUTO-ENTMCNC: 31.3 G/DL (ref 31.5–36.5)
MCV RBC AUTO: 98 FL (ref 78–100)
PLATELET # BLD AUTO: 185 10E9/L (ref 150–450)
RBC # BLD AUTO: 4.96 10E12/L (ref 4.4–5.9)
WBC # BLD AUTO: 10 10E9/L (ref 4–11)

## 2017-04-14 PROCEDURE — 36415 COLL VENOUS BLD VENIPUNCTURE: CPT | Performed by: PHYSICIAN ASSISTANT

## 2017-04-14 PROCEDURE — 85027 COMPLETE CBC AUTOMATED: CPT | Performed by: PHYSICIAN ASSISTANT

## 2017-04-17 ENCOUNTER — OFFICE VISIT (OUTPATIENT)
Dept: VASCULAR SURGERY | Facility: CLINIC | Age: 62
End: 2017-04-17

## 2017-04-17 ENCOUNTER — ANESTHESIA EVENT (OUTPATIENT)
Dept: SURGERY | Facility: CLINIC | Age: 62
DRG: 272 | End: 2017-04-17
Payer: COMMERCIAL

## 2017-04-17 VITALS
OXYGEN SATURATION: 96 % | DIASTOLIC BLOOD PRESSURE: 78 MMHG | SYSTOLIC BLOOD PRESSURE: 121 MMHG | HEART RATE: 76 BPM | RESPIRATION RATE: 20 BRPM

## 2017-04-17 DIAGNOSIS — I74.09 AORTOILIAC OCCLUSIVE DISEASE (H): ICD-10-CM

## 2017-04-17 DIAGNOSIS — I74.09 AORTOILIAC OCCLUSIVE DISEASE (H): Primary | ICD-10-CM

## 2017-04-17 LAB
CREAT SERPL-MCNC: 0.7 MG/DL (ref 0.66–1.25)
ERYTHROCYTE [DISTWIDTH] IN BLOOD BY AUTOMATED COUNT: 14 % (ref 10–15)
GFR SERPL CREATININE-BSD FRML MDRD: NORMAL ML/MIN/1.7M2
HCT VFR BLD AUTO: 49.6 % (ref 40–53)
HGB BLD-MCNC: 15.5 G/DL (ref 13.3–17.7)
MCH RBC QN AUTO: 30.7 PG (ref 26.5–33)
MCHC RBC AUTO-ENTMCNC: 31.3 G/DL (ref 31.5–36.5)
MCV RBC AUTO: 98 FL (ref 78–100)
PLATELET # BLD AUTO: 194 10E9/L (ref 150–450)
POTASSIUM SERPL-SCNC: 4.4 MMOL/L (ref 3.4–5.3)
RBC # BLD AUTO: 5.05 10E12/L (ref 4.4–5.9)
WBC # BLD AUTO: 12 10E9/L (ref 4–11)

## 2017-04-17 RX ORDER — ROSUVASTATIN CALCIUM 10 MG/1
TABLET, COATED ORAL
Refills: 1 | COMMUNITY
Start: 2017-04-04 | End: 2017-06-09

## 2017-04-17 RX ORDER — HYDROCORTISONE 2.5 %
CREAM (GRAM) TOPICAL
Refills: 3 | COMMUNITY
Start: 2017-04-09 | End: 2017-07-13

## 2017-04-17 ASSESSMENT — PAIN SCALES - GENERAL: PAINLEVEL: MILD PAIN (2)

## 2017-04-17 NOTE — LETTER
"4/17/2017       RE: Santos Rajan  [address]  [facility] [place] [facility] [address]     Dear Colleague,    Thank you for referring your patient, Santos Rajan, to the [facility]. Please see a copy of my visit note below.    VASCULAR SURGERY CLINIC ESTABLISHED PATIENT NOTE    HPI:  Mr. Rajan is a 61- year old mal who was scheduled for endovascular aorto-bifemoral repair for treatment of a small abdominal aortic aneurysm and bilateral iliac occlusive disease 3/28/2017. Several concerns were noted pre-operatively, including antibodies in blood and leukocytosis. Mr. Rajan's chest xray and leukocytosis was attributed to an upper respiratory infection and he has taken 2- courses of Levaquin. His WBC on 4/13/2017 10.0.     SUBJECTIVE: He endorses feeling improved, however still has some effects of the \"cold.\"     OBJECTIVE:  Vital signs:  121/78  76  20    Prior to Admission medications    Medication Sig Start Date End Date Taking? Authorizing Provider   hydrocortisone 2.5 % cream APPLY TO FACE TOPICALLY BID 4/9/17   Reported, Patient   rosuvastatin (CRESTOR) 10 MG tablet TK 1 T PO D 4/4/17   Reported, Patient   benzonatate (TESSALON) 200 MG capsule Take 1 capsule (200 mg) by mouth 3 times daily as needed for cough 4/11/17   Roly Tomas PA-C   albuterol (PROAIR HFA/PROVENTIL HFA/VENTOLIN HFA) 108 (90 BASE) MCG/ACT Inhaler Inhale 2 puffs into the lungs every 6 hours as needed for shortness of breath / dyspnea or wheezing 4/11/17   Roly Tomas PA-C   levofloxacin (LEVAQUIN) 500 MG tablet Take 1 tablet (500 mg) by mouth daily 4/11/17   Roly Tomas PA-C   aspirin EC 81 MG EC tablet Take 1 tablet (81 mg) by mouth daily  Patient taking differently: Take 81 mg by mouth every evening  3/22/17   Mike Burk MD   metoprolol (TOPROL-XL) 25 MG 24 hr tablet Take 1 tablet (25 mg) by mouth daily  Patient taking differently: Take 25 mg by mouth " every evening  3/22/17   Mike Burk MD   amLODIPine-benazepril (LOTREL) 10-40 MG per capsule Take 1 capsule by mouth daily  Patient taking differently: Take 1 capsule by mouth every evening  1/10/17   Roly Tomas PA-C   traMADol (ULTRAM) 50 MG tablet Take 1-2 tablets ( mg) by mouth every 6 hours as needed for pain  Patient not taking: Reported on 4/11/2017 1/10/17   Roly Tomas PA-C   naproxen (NAPROSYN) 500 MG tablet Take 1 tablet (500 mg) by mouth 2 times daily (with meals)  Patient not taking: Reported on 4/11/2017 12/22/16   Nico Alva MD   nicotine (NICODERM CQ) 21 MG/24HR patch 2h hr Place 1 patch onto the skin every 24 hours 11/15/16   Lisbeth Horowitz MD       PHYSICAL EXAM:  NEURO/PSYCH: The patient is alert and oriented.  Appropriate.  Moves all extremities.   SKIN: Color appropriate for race, warm, dry.  PULMONARY: Does not require supplemental oxygen, no acute distress noted.    3/28/2017 PORTABLE CHEST XRAY:  FINDINGS: Cardiac silhouette within normal limits. Right medial lung  base opacity, which appears new since 6/14/2016. No pleural effusion,  or pneumothorax.    ASSESSMENTPLAN:  #1 Lifestyle limiting claudication secondary to aorto-iliac occlusive disease  #2 Abdominal aortic aneurysm  - will do pre-operative blood work today  - plan for procedure tomorrow morning at 8 AM     #3 Leukocytosis, resolved  #4 Right lung opacity, likely pneumonia, treated  - has completed 2- courses of Levaquin  - feeling improved  - appears non-toxic and in no acute distress    Please contact Dr. Granados's Vascular Surgery Service with questions or concerns.    DEANA Dennison, CNP  Division of Vascular Surgery  [facility]  Pager: [phone]

## 2017-04-17 NOTE — MR AVS SNAPSHOT
After Visit Summary   4/17/2017    Santos Rajan    MRN: [id number]           Patient Information     Date Of Birth          1955        Visit Information        Provider Department      4/17/2017 1:00 PM Cecille Kinney, DEANA [facility]        Today's Diagnoses     Aortoiliac occlusive disease (H)    -  1       Follow-ups after your visit        Your next 10 appointments already scheduled     Apr 18, 2017   Procedure with Nelly Granados MD   [facility] [place], Same Day Surgery (--)    [address] [facility] [address] [phone]            Apr 18, 2017  8:00 PM CDT   IR OR ANGIOGRAM with UUIROR1   [facility] [place], Interventional Radiology ([facility], [place])    [address] [address] [phone]           1. Your doctor will need to do a history and physical within 30 days before this procedure. 2. Your doctor will determine whether you need a 12 lead EKG, as well as which medications should not be taken the morning of the exam. 3. Laboratory tests are to be obtained by your doctor prior to the exam (creatinine, Hgb/Hct, platelet count, and INR) 4. If you have allergies to x-ray contrast or iodine, contact your doctor or a Radiology nurse prior to the exam day for instructions. 5. Someone will need to drive you to and from the hospital. 6. Bring a list of all drugs you are taking; include supplements and over-the-counter medications. 7. Wear comfortable clothes and leave your valuables at home. 8. If you are or may be pregnant, contact your doctor or a Radiology nurse prior to the day of the exam. 9.  If you have diabetes, check with your doctor or a Radiology nurse to see if your insulin needs to be adjusted for the exam. 10. If you are taking Coumadin (to thin you blood) please contact your doctor or a Radiology nurse at least 5 days before the exam for special instructions. 11. You should not  have received barium (x-ray contrast) within 48 hours of this exam. 12. The day before your exam you may eat your regular diet and are encouraged to drink at least 2 quarts of clear liquids. Drink no alcoholic beverages for 24 hours prior to the exam. 13. If you have a colostomy you will need to irrigate it with tap water at 8PM the evening before and again at 6AM the morning of the exam. 14. Do not smoke for 24 hours prior to the procedure. 15. Do not eat any solid food or milk products for 6 hours prior to the exam. You may drink clear liquids until 2 hours prior to the exam. Clear liquids include the following: water, Jell-O, clear broth, apple juice or any non-carbonated drink that you can see through (no pop!) 16. The morning of the exam you may brush your teeth and take medications as directed with a sip of water. 17. Tell the Radiology nurse if you have any allergies. 18. You will be asked to empty your bladder before the exam begins. 19. Following the exam you will need to remain on complete bedrest for 4-6 hours. The nurse will monitor your vital signs, puncture site, and the pulses and temperature of the arm or leg that was punctured. 20. When discharged, you cannot drive until morning, and an adult must be with you until then. You should stay in the [facility] area overnight.              Who to contact     Please call your clinic at [phone] to:    Ask questions about your health    Make or cancel appointments    Discuss your medicines    Learn about your test results    Speak to your doctor   If you have compliments or concerns about an experience at your clinic, or if you wish to file a complaint, please contact [facility] Patient Relations at [phone] or email us at [email or web address]         Additional Information About Your Visit        [employer] Information     [employer] gives you secure access to your electronic health record. If you see a primary care  provider, you can also send messages to your care team and make appointments. If you have questions, please call your primary care clinic.  If you do not have a primary care provider, please call [phone] and they will assist you.      The [employer] is an electronic gateway that provides easy, online access to your medical records. With [employer], you can request a clinic appointment, read your test results, renew a prescription or communicate with your care team.     To access your existing account, please contact your [facility] or call [phone] for assistance.        Care EveryWhere ID     This is your Care EveryWhere ID. This could be used by other organizations to access your [place] medical records  [id number]        Your Vitals Were     Pulse Respirations Pulse Oximetry             76 20 96%          Blood Pressure from Last 3 Encounters:   04/17/17 121/78   04/11/17 111/63   03/28/17 104/72    Weight from Last 3 Encounters:   04/11/17 241 lb 6.4 oz   03/28/17 236 lb   03/28/17 236 lb 8.9 oz              We Performed the Following     ABO/Rh type and screen          Today's Medication Changes          These changes are accurate as of: 4/17/17  2:29 PM.  If you have any questions, ask your nurse or doctor.               These medicines have changed or have updated prescriptions.        Dose/Directions    amLODIPine-benazepril 10-40 MG per capsule   Commonly known as:  LOTREL   This may have changed:  when to take this   Used for:  Benign essential hypertension        Dose:  1 capsule   Take 1 capsule by mouth daily   Quantity:  90 capsule   Refills:  1       aspirin 81 MG EC tablet   This may have changed:  when to take this   Used for:  Coronary atherosclerosis due to lipid rich plaque        Dose:  81 mg   Take 1 tablet (81 mg) by mouth daily   Quantity:  60 tablet   Refills:  3       metoprolol 25 MG 24 hr tablet   Commonly known as:  TOPROL-XL   This may have changed:   when to take this   Used for:  Coronary atherosclerosis due to lipid rich plaque        Dose:  25 mg   Take 1 tablet (25 mg) by mouth daily   Quantity:  30 tablet   Refills:  3         Stop taking these medicines if you haven't already. Please contact your care team if you have questions.     gabapentin 100 MG capsule   Commonly known as:  NEURONTIN   Stopped by:  Cecille Kinney APRN CNP           IBUPROFEN PO   Stopped by:  Cecille Kinney APRN CNP                    Primary Care Provider Office Phone # Fax #    Roly Tomas PA-C [phone]       [facility] [place] [address]  [place] [address]        Thank you!     Thank you for choosing [facility]  for your care. Our goal is always to provide you with excellent care. Hearing back from our patients is one way we can continue to improve our services. Please take a few minutes to complete the written survey that you may receive in the mail after your visit with us. Thank you!             Your Updated Medication List - Protect others around you: Learn how to safely use, store and throw away your medicines at www.disposemymeds.org.          This list is accurate as of: 4/17/17  2:29 PM.  Always use your most recent med list.                   Brand Name Dispense Instructions for use    albuterol 108 (90 BASE) MCG/ACT Inhaler    PROAIR HFA/PROVENTIL HFA/VENTOLIN HFA    1 Inhaler    Inhale 2 puffs into the lungs every 6 hours as needed for shortness of breath / dyspnea or wheezing       amLODIPine-benazepril 10-40 MG per capsule    LOTREL    90 capsule    Take 1 capsule by mouth daily       aspirin 81 MG EC tablet     60 tablet    Take 1 tablet (81 mg) by mouth daily       benzonatate 200 MG capsule    TESSALON    30 capsule    Take 1 capsule (200 mg) by mouth 3 times daily as needed for cough       hydrocortisone 2.5 % cream      APPLY TO FACE TOPICALLY BID       levofloxacin 500 MG tablet    LEVAQUIN    5 tablet    Take  1 tablet (500 mg) by mouth daily       metoprolol 25 MG 24 hr tablet    TOPROL-XL    30 tablet    Take 1 tablet (25 mg) by mouth daily       naproxen 500 MG tablet    NAPROSYN    40 tablet    Take 1 tablet (500 mg) by mouth 2 times daily (with meals)       nicotine 21 MG/24HR 24 hr patch    NICODERM CQ    15 patch    Place 1 patch onto the skin every 24 hours       rosuvastatin 10 MG tablet    CRESTOR     TK 1 T PO D       traMADol 50 MG tablet    ULTRAM    30 tablet    Take 1-2 tablets ( mg) by mouth every 6 hours as needed for pain

## 2017-04-17 NOTE — NURSING NOTE
Chief Complaint   Patient presents with     Consult     Pre op discussion        Vitals:    04/17/17 1256   BP: 121/78   BP Location: Left arm   Pulse: 76   Resp: 20   SpO2: 96%       There is no height or weight on file to calculate BMI.               Mehreen Montano LPN

## 2017-04-17 NOTE — PROGRESS NOTES
"VASCULAR SURGERY CLINIC ESTABLISHED PATIENT NOTE    HPI:  Mr. Rajan is a 61- year old mal who was scheduled for endovascular aorto-bifemoral repair for treatment of a small abdominal aortic aneurysm and bilateral iliac occlusive disease 3/28/2017. Several concerns were noted pre-operatively, including antibodies in blood and leukocytosis. Mr. Rajan's chest xray and leukocytosis was attributed to an upper respiratory infection and he has taken 2- courses of Levaquin. His WBC on 4/13/2017 10.0.     SUBJECTIVE: He endorses feeling improved, however still has some effects of the \"cold.\"     OBJECTIVE:  Vital signs:  121/78  76  20    Prior to Admission medications    Medication Sig Start Date End Date Taking? Authorizing Provider   hydrocortisone 2.5 % cream APPLY TO FACE TOPICALLY BID 4/9/17   Reported, Patient   rosuvastatin (CRESTOR) 10 MG tablet TK 1 T PO D 4/4/17   Reported, Patient   benzonatate (TESSALON) 200 MG capsule Take 1 capsule (200 mg) by mouth 3 times daily as needed for cough 4/11/17   Roly Tomas PA-C   albuterol (PROAIR HFA/PROVENTIL HFA/VENTOLIN HFA) 108 (90 BASE) MCG/ACT Inhaler Inhale 2 puffs into the lungs every 6 hours as needed for shortness of breath / dyspnea or wheezing 4/11/17   Roly Tomas PA-C   levofloxacin (LEVAQUIN) 500 MG tablet Take 1 tablet (500 mg) by mouth daily 4/11/17   Roly Tomas PA-C   aspirin EC 81 MG EC tablet Take 1 tablet (81 mg) by mouth daily  Patient taking differently: Take 81 mg by mouth every evening  3/22/17   Mike Burk MD   metoprolol (TOPROL-XL) 25 MG 24 hr tablet Take 1 tablet (25 mg) by mouth daily  Patient taking differently: Take 25 mg by mouth every evening  3/22/17   Mike Burk MD   amLODIPine-benazepril (LOTREL) 10-40 MG per capsule Take 1 capsule by mouth daily  Patient taking differently: Take 1 capsule by mouth every evening  1/10/17   Roly Tomas PA-C   traMADol (ULTRAM) 50 MG tablet Take 1-2 tablets " ( mg) by mouth every 6 hours as needed for pain  Patient not taking: Reported on 4/11/2017 1/10/17   Roly Tomas PA-C   naproxen (NAPROSYN) 500 MG tablet Take 1 tablet (500 mg) by mouth 2 times daily (with meals)  Patient not taking: Reported on 4/11/2017 12/22/16   Nico Alva MD   nicotine (NICODERM CQ) 21 MG/24HR patch 2h hr Place 1 patch onto the skin every 24 hours 11/15/16   Lisbeth Horowitz MD       PHYSICAL EXAM:  NEURO/PSYCH: The patient is alert and oriented.  Appropriate.  Moves all extremities.   SKIN: Color appropriate for race, warm, dry.  PULMONARY: Does not require supplemental oxygen, no acute distress noted.    3/28/2017 PORTABLE CHEST XRAY:  FINDINGS: Cardiac silhouette within normal limits. Right medial lung  base opacity, which appears new since 6/14/2016. No pleural effusion,  or pneumothorax.    ASSESSMENTPLAN:  #1 Lifestyle limiting claudication secondary to aorto-iliac occlusive disease  #2 Abdominal aortic aneurysm  - will do pre-operative blood work today  - plan for procedure tomorrow morning at 8 AM     #3 Leukocytosis, resolved  #4 Right lung opacity, likely pneumonia, treated  - has completed 2- courses of Levaquin  - feeling improved  - appears non-toxic and in no acute distress    Please contact Dr. Granados's Vascular Surgery Service with questions or concerns.    Cecille Kinney, DEANA, CNP  Division of Vascular Surgery  [facility]  Pager: [phone]

## 2017-04-18 ENCOUNTER — ANESTHESIA (OUTPATIENT)
Dept: SURGERY | Facility: CLINIC | Age: 62
DRG: 272 | End: 2017-04-18
Payer: COMMERCIAL

## 2017-04-18 ENCOUNTER — APPOINTMENT (OUTPATIENT)
Dept: GENERAL RADIOLOGY | Facility: CLINIC | Age: 62
DRG: 272 | End: 2017-04-18
Attending: ANESTHESIOLOGY
Payer: COMMERCIAL

## 2017-04-18 ENCOUNTER — HOSPITAL ENCOUNTER (INPATIENT)
Facility: CLINIC | Age: 62
LOS: 2 days | Discharge: HOME | DRG: 272 | End: 2017-04-20
Attending: SURGERY | Admitting: SURGERY
Payer: COMMERCIAL

## 2017-04-18 ENCOUNTER — APPOINTMENT (OUTPATIENT)
Dept: INTERVENTIONAL RADIOLOGY/VASCULAR | Facility: CLINIC | Age: 62
DRG: 272 | End: 2017-04-18
Attending: SURGERY
Payer: COMMERCIAL

## 2017-04-18 DIAGNOSIS — I70.8 AORTO-ILIAC ATHEROSCLEROSIS (H): Primary | ICD-10-CM

## 2017-04-18 DIAGNOSIS — Z72.0 TOBACCO ABUSE: ICD-10-CM

## 2017-04-18 DIAGNOSIS — M25.552 HIP PAIN, LEFT: ICD-10-CM

## 2017-04-18 DIAGNOSIS — I70.0 AORTO-ILIAC ATHEROSCLEROSIS (H): Primary | ICD-10-CM

## 2017-04-18 PROBLEM — I71.40 AAA (ABDOMINAL AORTIC ANEURYSM) WITHOUT RUPTURE (H): Status: ACTIVE | Noted: 2017-04-18

## 2017-04-18 LAB
ABO + RH BLD: ABNORMAL
ABO + RH BLD: ABNORMAL
ALBUMIN SERPL-MCNC: 3.1 G/DL (ref 3.4–5)
ALP SERPL-CCNC: 57 U/L (ref 40–150)
ALT SERPL W P-5'-P-CCNC: 19 U/L (ref 0–70)
ANION GAP SERPL CALCULATED.3IONS-SCNC: 6 MMOL/L (ref 3–14)
AST SERPL W P-5'-P-CCNC: 21 U/L (ref 0–45)
BASE EXCESS BLDA CALC-SCNC: 0.8 MMOL/L
BILIRUB SERPL-MCNC: 0.8 MG/DL (ref 0.2–1.3)
BLD GP AB SCN SERPL QL: ABNORMAL
BLD PROD TYP BPU: ABNORMAL
BLOOD BANK CMNT PATIENT-IMP: ABNORMAL
BLOOD BANK CMNT PATIENT-IMP: ABNORMAL
BUN SERPL-MCNC: 15 MG/DL (ref 7–30)
CA-I BLD-MCNC: 4.7 MG/DL (ref 4.4–5.2)
CALCIUM SERPL-MCNC: 7.9 MG/DL (ref 8.5–10.1)
CHLORIDE SERPL-SCNC: 108 MMOL/L (ref 94–109)
CO2 SERPL-SCNC: 26 MMOL/L (ref 20–32)
CREAT SERPL-MCNC: 0.64 MG/DL (ref 0.66–1.25)
GFR SERPL CREATININE-BSD FRML MDRD: ABNORMAL ML/MIN/1.7M2
GLUCOSE BLD-MCNC: 108 MG/DL (ref 70–99)
GLUCOSE SERPL-MCNC: 126 MG/DL (ref 70–99)
HCO3 BLD-SCNC: 27 MMOL/L (ref 21–28)
HGB BLD-MCNC: 12.9 G/DL (ref 13.3–17.7)
HGB BLD-MCNC: 13.9 G/DL (ref 13.3–17.7)
INR PPP: 0.97 (ref 0.86–1.14)
NUM BPU REQUESTED: 10
O2/TOTAL GAS SETTING VFR VENT: 52 %
PCO2 BLD: 49 MM HG (ref 35–45)
PH BLD: 7.35 PH (ref 7.35–7.45)
PLATELET # BLD AUTO: 141 10E9/L (ref 150–450)
PLATELET # BLD AUTO: 154 10E9/L (ref 150–450)
PO2 BLD: 157 MM HG (ref 80–105)
POTASSIUM BLD-SCNC: 4.1 MMOL/L (ref 3.4–5.3)
POTASSIUM SERPL-SCNC: 4.2 MMOL/L (ref 3.4–5.3)
PROT SERPL-MCNC: 6.3 G/DL (ref 6.8–8.8)
SODIUM BLD-SCNC: 141 MMOL/L (ref 133–144)
SODIUM SERPL-SCNC: 140 MMOL/L (ref 133–144)
SPECIMEN EXP DATE BLD: ABNORMAL

## 2017-04-18 PROCEDURE — 25000128 H RX IP 250 OP 636: Performed by: NURSE ANESTHETIST, CERTIFIED REGISTERED

## 2017-04-18 PROCEDURE — 85347 COAGULATION TIME ACTIVATED: CPT

## 2017-04-18 PROCEDURE — 25000125 ZZHC RX 250: Performed by: ANESTHESIOLOGY

## 2017-04-18 PROCEDURE — 27210995 ZZH RX 272: Performed by: SURGERY

## 2017-04-18 PROCEDURE — 25800025 ZZH RX 258: Performed by: NURSE ANESTHETIST, CERTIFIED REGISTERED

## 2017-04-18 PROCEDURE — 84132 ASSAY OF SERUM POTASSIUM: CPT | Performed by: SURGERY

## 2017-04-18 PROCEDURE — 25000128 H RX IP 250 OP 636: Performed by: ANESTHESIOLOGY

## 2017-04-18 PROCEDURE — 27210794 ZZH OR GENERAL SUPPLY STERILE: Performed by: SURGERY

## 2017-04-18 PROCEDURE — 71000014 ZZH RECOVERY PHASE 1 LEVEL 2 FIRST HR: Performed by: SURGERY

## 2017-04-18 PROCEDURE — C1877 STENT, NON-COAT/COV W/O DEL: HCPCS | Performed by: SURGERY

## 2017-04-18 PROCEDURE — C1763 CONN TISS, NON-HUMAN: HCPCS | Performed by: SURGERY

## 2017-04-18 PROCEDURE — C1730 CATH, EP, 19 OR FEW ELECT: HCPCS | Performed by: SURGERY

## 2017-04-18 PROCEDURE — 25000128 H RX IP 250 OP 636: Performed by: SURGERY

## 2017-04-18 PROCEDURE — 25000132 ZZH RX MED GY IP 250 OP 250 PS 637: Performed by: SURGERY

## 2017-04-18 PROCEDURE — 25000125 ZZHC RX 250: Performed by: NURSE ANESTHETIST, CERTIFIED REGISTERED

## 2017-04-18 PROCEDURE — 85049 AUTOMATED PLATELET COUNT: CPT | Performed by: SURGERY

## 2017-04-18 PROCEDURE — 25000128 H RX IP 250 OP 636: Performed by: CLINICAL NURSE SPECIALIST

## 2017-04-18 PROCEDURE — C1768 GRAFT, VASCULAR: HCPCS | Performed by: SURGERY

## 2017-04-18 PROCEDURE — P9041 ALBUMIN (HUMAN),5%, 50ML: HCPCS | Performed by: NURSE ANESTHETIST, CERTIFIED REGISTERED

## 2017-04-18 PROCEDURE — 37000009 ZZH ANESTHESIA TECHNICAL FEE, EACH ADDTL 15 MIN: Performed by: SURGERY

## 2017-04-18 PROCEDURE — 40000170 ZZH STATISTIC PRE-PROCEDURE ASSESSMENT II: Performed by: SURGERY

## 2017-04-18 PROCEDURE — C1769 GUIDE WIRE: HCPCS | Performed by: SURGERY

## 2017-04-18 PROCEDURE — 88311 DECALCIFY TISSUE: CPT | Performed by: SURGERY

## 2017-04-18 PROCEDURE — 04CL3ZZ EXTIRPATION OF MATTER FROM LEFT FEMORAL ARTERY, PERCUTANEOUS APPROACH: ICD-10-PCS | Performed by: SURGERY

## 2017-04-18 PROCEDURE — 37000008 ZZH ANESTHESIA TECHNICAL FEE, 1ST 30 MIN: Performed by: SURGERY

## 2017-04-18 PROCEDURE — 40000985 XR CHEST PORT 1 VW

## 2017-04-18 PROCEDURE — 40000196 ZZH STATISTIC RAPCV CVP MONITORING

## 2017-04-18 PROCEDURE — 84295 ASSAY OF SERUM SODIUM: CPT | Performed by: SURGERY

## 2017-04-18 PROCEDURE — 25800025 ZZH RX 258: Performed by: ANESTHESIOLOGY

## 2017-04-18 PROCEDURE — 25000125 ZZHC RX 250: Performed by: SURGERY

## 2017-04-18 PROCEDURE — 40000003 ZZH STATISTIC IR STAFF TIME IN THE OR

## 2017-04-18 PROCEDURE — 27211024 ZZHC OR SUPPLY OTHER OPNP: Performed by: SURGERY

## 2017-04-18 PROCEDURE — 93010 ELECTROCARDIOGRAM REPORT: CPT | Performed by: INTERNAL MEDICINE

## 2017-04-18 PROCEDURE — 047J3EZ DILATION OF LEFT EXTERNAL ILIAC ARTERY WITH TWO INTRALUMINAL DEVICES, PERCUTANEOUS APPROACH: ICD-10-PCS | Performed by: SURGERY

## 2017-04-18 PROCEDURE — C1894 INTRO/SHEATH, NON-LASER: HCPCS | Performed by: SURGERY

## 2017-04-18 PROCEDURE — 93005 ELECTROCARDIOGRAM TRACING: CPT

## 2017-04-18 PROCEDURE — 25500064 ZZH RX 255 OP 636: Performed by: SURGERY

## 2017-04-18 PROCEDURE — 04UL0KZ SUPPLEMENT LEFT FEMORAL ARTERY WITH NONAUTOLOGOUS TISSUE SUBSTITUTE, OPEN APPROACH: ICD-10-PCS | Performed by: SURGERY

## 2017-04-18 PROCEDURE — 36000072 ZZH SURGERY LEVEL 5 W FLUORO 1ST 30 MIN - UMMC: Performed by: SURGERY

## 2017-04-18 PROCEDURE — 36415 COLL VENOUS BLD VENIPUNCTURE: CPT | Performed by: SURGERY

## 2017-04-18 PROCEDURE — 25000128 H RX IP 250 OP 636: Performed by: STUDENT IN AN ORGANIZED HEALTH CARE EDUCATION/TRAINING PROGRAM

## 2017-04-18 PROCEDURE — 85610 PROTHROMBIN TIME: CPT | Performed by: SURGERY

## 2017-04-18 PROCEDURE — 82947 ASSAY GLUCOSE BLOOD QUANT: CPT | Performed by: SURGERY

## 2017-04-18 PROCEDURE — C1887 CATHETER, GUIDING: HCPCS | Performed by: SURGERY

## 2017-04-18 PROCEDURE — C1725 CATH, TRANSLUMIN NON-LASER: HCPCS | Performed by: SURGERY

## 2017-04-18 PROCEDURE — 80053 COMPREHEN METABOLIC PANEL: CPT | Performed by: ANESTHESIOLOGY

## 2017-04-18 PROCEDURE — 40000014 ZZH STATISTIC ARTERIAL MONITORING DAILY

## 2017-04-18 PROCEDURE — C9399 UNCLASSIFIED DRUGS OR BIOLOG: HCPCS | Performed by: NURSE ANESTHETIST, CERTIFIED REGISTERED

## 2017-04-18 PROCEDURE — 25000565 ZZH ISOFLURANE, EA 15 MIN: Performed by: SURGERY

## 2017-04-18 PROCEDURE — 82803 BLOOD GASES ANY COMBINATION: CPT | Performed by: SURGERY

## 2017-04-18 PROCEDURE — 36592 COLLECT BLOOD FROM PICC: CPT | Performed by: SURGERY

## 2017-04-18 PROCEDURE — 047C3DZ DILATION OF RIGHT COMMON ILIAC ARTERY WITH INTRALUMINAL DEVICE, PERCUTANEOUS APPROACH: ICD-10-PCS | Performed by: SURGERY

## 2017-04-18 PROCEDURE — 27810325 ZZHC OR IMPLANT OTHER OPNP: Performed by: SURGERY

## 2017-04-18 PROCEDURE — 82330 ASSAY OF CALCIUM: CPT | Performed by: SURGERY

## 2017-04-18 PROCEDURE — 36000070 ZZH SURGERY LEVEL 5 EA 15 ADDTL MIN - UMMC: Performed by: SURGERY

## 2017-04-18 PROCEDURE — 71000015 ZZH RECOVERY PHASE 1 LEVEL 2 EA ADDTL HR: Performed by: SURGERY

## 2017-04-18 PROCEDURE — 25800025 ZZH RX 258: Performed by: SURGERY

## 2017-04-18 PROCEDURE — 85049 AUTOMATED PLATELET COUNT: CPT | Performed by: ANESTHESIOLOGY

## 2017-04-18 PROCEDURE — 25000132 ZZH RX MED GY IP 250 OP 250 PS 637: Performed by: NURSE ANESTHETIST, CERTIFIED REGISTERED

## 2017-04-18 PROCEDURE — 047D3EZ DILATION OF LEFT COMMON ILIAC ARTERY WITH TWO INTRALUMINAL DEVICES, PERCUTANEOUS APPROACH: ICD-10-PCS | Performed by: SURGERY

## 2017-04-18 PROCEDURE — 40000275 ZZH STATISTIC RCP TIME EA 10 MIN

## 2017-04-18 PROCEDURE — 88304 TISSUE EXAM BY PATHOLOGIST: CPT | Performed by: SURGERY

## 2017-04-18 PROCEDURE — 85018 HEMOGLOBIN: CPT | Performed by: ANESTHESIOLOGY

## 2017-04-18 PROCEDURE — 12000006 ZZH R&B IMCU INTERMEDIATE UMMC

## 2017-04-18 DEVICE — IMPLANTABLE DEVICE: Type: IMPLANTABLE DEVICE | Site: ILIAC/FEMORALS | Status: FUNCTIONAL

## 2017-04-18 DEVICE — GRAFT PATCH VASC XENOSURE BIOLOGIC 0.8X08CM E0.8P8: Type: IMPLANTABLE DEVICE | Site: ILIAC/FEMORALS | Status: FUNCTIONAL

## 2017-04-18 RX ORDER — AMLODIPINE AND BENAZEPRIL HYDROCHLORIDE 10; 40 MG/1; MG/1
1 CAPSULE ORAL EVERY EVENING
Status: DISCONTINUED | OUTPATIENT
Start: 2017-04-18 | End: 2017-04-18

## 2017-04-18 RX ORDER — LIDOCAINE 40 MG/G
CREAM TOPICAL
Status: DISCONTINUED | OUTPATIENT
Start: 2017-04-18 | End: 2017-04-20 | Stop reason: HOSPADM

## 2017-04-18 RX ORDER — BUPIVACAINE HYDROCHLORIDE AND EPINEPHRINE 2.5; 5 MG/ML; UG/ML
INJECTION, SOLUTION EPIDURAL; INFILTRATION; INTRACAUDAL; PERINEURAL PRN
Status: DISCONTINUED | OUTPATIENT
Start: 2017-04-18 | End: 2017-04-18 | Stop reason: HOSPADM

## 2017-04-18 RX ORDER — HYDROMORPHONE HYDROCHLORIDE 1 MG/ML
.3-.5 INJECTION, SOLUTION INTRAMUSCULAR; INTRAVENOUS; SUBCUTANEOUS
Status: DISCONTINUED | OUTPATIENT
Start: 2017-04-18 | End: 2017-04-20 | Stop reason: HOSPADM

## 2017-04-18 RX ORDER — ROSUVASTATIN CALCIUM 10 MG/1
10 TABLET, COATED ORAL AT BEDTIME
Status: DISCONTINUED | OUTPATIENT
Start: 2017-04-18 | End: 2017-04-20 | Stop reason: HOSPADM

## 2017-04-18 RX ORDER — EPHEDRINE SULFATE 50 MG/ML
INJECTION, SOLUTION INTRAMUSCULAR; INTRAVENOUS; SUBCUTANEOUS PRN
Status: DISCONTINUED | OUTPATIENT
Start: 2017-04-18 | End: 2017-04-18

## 2017-04-18 RX ORDER — FENTANYL CITRATE 50 UG/ML
INJECTION, SOLUTION INTRAMUSCULAR; INTRAVENOUS PRN
Status: DISCONTINUED | OUTPATIENT
Start: 2017-04-18 | End: 2017-04-18

## 2017-04-18 RX ORDER — CLOPIDOGREL BISULFATE 75 MG/1
300 TABLET ORAL ONCE
Status: COMPLETED | OUTPATIENT
Start: 2017-04-18 | End: 2017-04-18

## 2017-04-18 RX ORDER — ALUMINA, MAGNESIA, AND SIMETHICONE 2400; 2400; 240 MG/30ML; MG/30ML; MG/30ML
30 SUSPENSION ORAL EVERY 4 HOURS PRN
Status: DISCONTINUED | OUTPATIENT
Start: 2017-04-18 | End: 2017-04-20 | Stop reason: HOSPADM

## 2017-04-18 RX ORDER — ONDANSETRON 4 MG/1
4 TABLET, ORALLY DISINTEGRATING ORAL EVERY 6 HOURS PRN
Status: DISCONTINUED | OUTPATIENT
Start: 2017-04-18 | End: 2017-04-20 | Stop reason: HOSPADM

## 2017-04-18 RX ORDER — ONDANSETRON 2 MG/ML
4 INJECTION INTRAMUSCULAR; INTRAVENOUS EVERY 30 MIN PRN
Status: DISCONTINUED | OUTPATIENT
Start: 2017-04-18 | End: 2017-04-18 | Stop reason: HOSPADM

## 2017-04-18 RX ORDER — NALOXONE HYDROCHLORIDE 0.4 MG/ML
.1-.4 INJECTION, SOLUTION INTRAMUSCULAR; INTRAVENOUS; SUBCUTANEOUS
Status: DISCONTINUED | OUTPATIENT
Start: 2017-04-18 | End: 2017-04-20 | Stop reason: HOSPADM

## 2017-04-18 RX ORDER — FENTANYL CITRATE 50 UG/ML
25-50 INJECTION, SOLUTION INTRAMUSCULAR; INTRAVENOUS
Status: DISCONTINUED | OUTPATIENT
Start: 2017-04-18 | End: 2017-04-18 | Stop reason: HOSPADM

## 2017-04-18 RX ORDER — ALBUTEROL SULFATE 90 UG/1
AEROSOL, METERED RESPIRATORY (INHALATION) PRN
Status: DISCONTINUED | OUTPATIENT
Start: 2017-04-18 | End: 2017-04-18

## 2017-04-18 RX ORDER — METOPROLOL TARTRATE 1 MG/ML
1-2 INJECTION, SOLUTION INTRAVENOUS EVERY 5 MIN PRN
Status: DISCONTINUED | OUTPATIENT
Start: 2017-04-18 | End: 2017-04-18 | Stop reason: HOSPADM

## 2017-04-18 RX ORDER — TRAMADOL HYDROCHLORIDE 50 MG/1
50-100 TABLET ORAL EVERY 6 HOURS PRN
Status: DISCONTINUED | OUTPATIENT
Start: 2017-04-18 | End: 2017-04-20 | Stop reason: HOSPADM

## 2017-04-18 RX ORDER — DIPHENHYDRAMINE HCL 25 MG
25 CAPSULE ORAL EVERY 6 HOURS PRN
Status: DISCONTINUED | OUTPATIENT
Start: 2017-04-18 | End: 2017-04-20 | Stop reason: HOSPADM

## 2017-04-18 RX ORDER — METOPROLOL SUCCINATE 25 MG/1
25 TABLET, EXTENDED RELEASE ORAL EVERY EVENING
Status: DISCONTINUED | OUTPATIENT
Start: 2017-04-18 | End: 2017-04-20 | Stop reason: HOSPADM

## 2017-04-18 RX ORDER — ACETAMINOPHEN 325 MG/1
975 TABLET ORAL EVERY 8 HOURS
Status: DISCONTINUED | OUTPATIENT
Start: 2017-04-18 | End: 2017-04-20 | Stop reason: HOSPADM

## 2017-04-18 RX ORDER — BENZONATATE 100 MG/1
200 CAPSULE ORAL 3 TIMES DAILY PRN
Status: DISCONTINUED | OUTPATIENT
Start: 2017-04-18 | End: 2017-04-20 | Stop reason: HOSPADM

## 2017-04-18 RX ORDER — DIPHENHYDRAMINE HYDROCHLORIDE 50 MG/ML
25 INJECTION INTRAMUSCULAR; INTRAVENOUS EVERY 6 HOURS PRN
Status: DISCONTINUED | OUTPATIENT
Start: 2017-04-18 | End: 2017-04-20 | Stop reason: HOSPADM

## 2017-04-18 RX ORDER — SODIUM CHLORIDE, SODIUM LACTATE, POTASSIUM CHLORIDE, CALCIUM CHLORIDE 600; 310; 30; 20 MG/100ML; MG/100ML; MG/100ML; MG/100ML
INJECTION, SOLUTION INTRAVENOUS CONTINUOUS PRN
Status: DISCONTINUED | OUTPATIENT
Start: 2017-04-18 | End: 2017-04-18

## 2017-04-18 RX ORDER — SODIUM CHLORIDE, SODIUM LACTATE, POTASSIUM CHLORIDE, CALCIUM CHLORIDE 600; 310; 30; 20 MG/100ML; MG/100ML; MG/100ML; MG/100ML
INJECTION, SOLUTION INTRAVENOUS CONTINUOUS
Status: DISCONTINUED | OUTPATIENT
Start: 2017-04-18 | End: 2017-04-19

## 2017-04-18 RX ORDER — CEFAZOLIN SODIUM 2 G/100ML
2 INJECTION, SOLUTION INTRAVENOUS
Status: COMPLETED | OUTPATIENT
Start: 2017-04-18 | End: 2017-04-18

## 2017-04-18 RX ORDER — IOPAMIDOL 755 MG/ML
INJECTION, SOLUTION INTRAVASCULAR PRN
Status: DISCONTINUED | OUTPATIENT
Start: 2017-04-18 | End: 2017-04-18 | Stop reason: HOSPADM

## 2017-04-18 RX ORDER — ACETAMINOPHEN 325 MG/1
650 TABLET ORAL EVERY 4 HOURS PRN
Status: DISCONTINUED | OUTPATIENT
Start: 2017-04-21 | End: 2017-04-20 | Stop reason: HOSPADM

## 2017-04-18 RX ORDER — LISINOPRIL 40 MG/1
40 TABLET ORAL EVERY EVENING
Status: DISCONTINUED | OUTPATIENT
Start: 2017-04-18 | End: 2017-04-20 | Stop reason: HOSPADM

## 2017-04-18 RX ORDER — SODIUM CHLORIDE, SODIUM LACTATE, POTASSIUM CHLORIDE, CALCIUM CHLORIDE 600; 310; 30; 20 MG/100ML; MG/100ML; MG/100ML; MG/100ML
INJECTION, SOLUTION INTRAVENOUS CONTINUOUS
Status: DISCONTINUED | OUTPATIENT
Start: 2017-04-18 | End: 2017-04-18 | Stop reason: HOSPADM

## 2017-04-18 RX ORDER — PROCHLORPERAZINE 25 MG
25 SUPPOSITORY, RECTAL RECTAL EVERY 12 HOURS PRN
Status: DISCONTINUED | OUTPATIENT
Start: 2017-04-18 | End: 2017-04-20 | Stop reason: HOSPADM

## 2017-04-18 RX ORDER — HEPARIN SODIUM 1000 [USP'U]/ML
INJECTION, SOLUTION INTRAVENOUS; SUBCUTANEOUS PRN
Status: DISCONTINUED | OUTPATIENT
Start: 2017-04-18 | End: 2017-04-18

## 2017-04-18 RX ORDER — ALBUMIN, HUMAN INJ 5% 5 %
SOLUTION INTRAVENOUS CONTINUOUS PRN
Status: DISCONTINUED | OUTPATIENT
Start: 2017-04-18 | End: 2017-04-18

## 2017-04-18 RX ORDER — PROCHLORPERAZINE MALEATE 5 MG
5-10 TABLET ORAL EVERY 6 HOURS PRN
Status: DISCONTINUED | OUTPATIENT
Start: 2017-04-18 | End: 2017-04-20 | Stop reason: HOSPADM

## 2017-04-18 RX ORDER — ONDANSETRON 2 MG/ML
INJECTION INTRAMUSCULAR; INTRAVENOUS PRN
Status: DISCONTINUED | OUTPATIENT
Start: 2017-04-18 | End: 2017-04-18

## 2017-04-18 RX ORDER — PROPOFOL 10 MG/ML
INJECTION, EMULSION INTRAVENOUS PRN
Status: DISCONTINUED | OUTPATIENT
Start: 2017-04-18 | End: 2017-04-18

## 2017-04-18 RX ORDER — CEFAZOLIN SODIUM 2 G/100ML
2 INJECTION, SOLUTION INTRAVENOUS EVERY 8 HOURS
Status: COMPLETED | OUTPATIENT
Start: 2017-04-18 | End: 2017-04-19

## 2017-04-18 RX ORDER — CILOSTAZOL 50 MG/1
50 TABLET ORAL 2 TIMES DAILY
Status: DISCONTINUED | OUTPATIENT
Start: 2017-04-19 | End: 2017-04-20 | Stop reason: HOSPADM

## 2017-04-18 RX ORDER — HEPARIN SODIUM 5000 [USP'U]/.5ML
5000 INJECTION, SOLUTION INTRAVENOUS; SUBCUTANEOUS EVERY 8 HOURS
Status: DISCONTINUED | OUTPATIENT
Start: 2017-04-19 | End: 2017-04-20 | Stop reason: HOSPADM

## 2017-04-18 RX ORDER — NICOTINE 21 MG/24HR
1 PATCH, TRANSDERMAL 24 HOURS TRANSDERMAL EVERY 24 HOURS
Status: DISCONTINUED | OUTPATIENT
Start: 2017-04-18 | End: 2017-04-20 | Stop reason: HOSPADM

## 2017-04-18 RX ORDER — AMLODIPINE BESYLATE 10 MG/1
10 TABLET ORAL EVERY EVENING
Status: DISCONTINUED | OUTPATIENT
Start: 2017-04-18 | End: 2017-04-20 | Stop reason: HOSPADM

## 2017-04-18 RX ORDER — ONDANSETRON 2 MG/ML
4 INJECTION INTRAMUSCULAR; INTRAVENOUS EVERY 6 HOURS PRN
Status: DISCONTINUED | OUTPATIENT
Start: 2017-04-18 | End: 2017-04-20 | Stop reason: HOSPADM

## 2017-04-18 RX ORDER — LABETALOL HYDROCHLORIDE 5 MG/ML
INJECTION, SOLUTION INTRAVENOUS PRN
Status: DISCONTINUED | OUTPATIENT
Start: 2017-04-18 | End: 2017-04-18

## 2017-04-18 RX ORDER — LIDOCAINE HYDROCHLORIDE 20 MG/ML
INJECTION, SOLUTION INFILTRATION; PERINEURAL PRN
Status: DISCONTINUED | OUTPATIENT
Start: 2017-04-18 | End: 2017-04-18

## 2017-04-18 RX ORDER — CLOPIDOGREL BISULFATE 75 MG/1
75 TABLET ORAL DAILY
Status: DISCONTINUED | OUTPATIENT
Start: 2017-04-19 | End: 2017-04-20 | Stop reason: HOSPADM

## 2017-04-18 RX ORDER — LIDOCAINE 40 MG/G
CREAM TOPICAL
Status: DISCONTINUED | OUTPATIENT
Start: 2017-04-18 | End: 2017-04-18 | Stop reason: HOSPADM

## 2017-04-18 RX ORDER — HYDROMORPHONE HYDROCHLORIDE 1 MG/ML
.3-.5 INJECTION, SOLUTION INTRAMUSCULAR; INTRAVENOUS; SUBCUTANEOUS EVERY 5 MIN PRN
Status: DISCONTINUED | OUTPATIENT
Start: 2017-04-18 | End: 2017-04-18 | Stop reason: HOSPADM

## 2017-04-18 RX ORDER — ONDANSETRON 4 MG/1
4 TABLET, ORALLY DISINTEGRATING ORAL EVERY 30 MIN PRN
Status: DISCONTINUED | OUTPATIENT
Start: 2017-04-18 | End: 2017-04-18 | Stop reason: HOSPADM

## 2017-04-18 RX ORDER — ALBUTEROL SULFATE 90 UG/1
2 AEROSOL, METERED RESPIRATORY (INHALATION) EVERY 6 HOURS PRN
Status: DISCONTINUED | OUTPATIENT
Start: 2017-04-18 | End: 2017-04-20 | Stop reason: HOSPADM

## 2017-04-18 RX ADMIN — SUGAMMADEX 200 MG: 100 INJECTION, SOLUTION INTRAVENOUS at 15:15

## 2017-04-18 RX ADMIN — NICOTINE 1 PATCH: 21 PATCH TRANSDERMAL at 20:18

## 2017-04-18 RX ADMIN — LABETALOL HYDROCHLORIDE 5 MG: 5 INJECTION, SOLUTION INTRAVENOUS at 15:29

## 2017-04-18 RX ADMIN — FENTANYL CITRATE 150 MCG: 50 INJECTION, SOLUTION INTRAMUSCULAR; INTRAVENOUS at 08:52

## 2017-04-18 RX ADMIN — METOPROLOL SUCCINATE 25 MG: 25 TABLET, EXTENDED RELEASE ORAL at 20:20

## 2017-04-18 RX ADMIN — Medication 2000 UNITS: at 10:40

## 2017-04-18 RX ADMIN — HYDROMORPHONE HYDROCHLORIDE 0.5 MG: 10 INJECTION, SOLUTION INTRAMUSCULAR; INTRAVENOUS; SUBCUTANEOUS at 20:18

## 2017-04-18 RX ADMIN — CEFAZOLIN SODIUM 1 G: 2 INJECTION, SOLUTION INTRAVENOUS at 12:51

## 2017-04-18 RX ADMIN — ROCURONIUM BROMIDE 10 MG: 10 INJECTION INTRAVENOUS at 09:41

## 2017-04-18 RX ADMIN — ROCURONIUM BROMIDE 50 MG: 10 INJECTION INTRAVENOUS at 08:47

## 2017-04-18 RX ADMIN — LISINOPRIL 40 MG: 40 TABLET ORAL at 20:20

## 2017-04-18 RX ADMIN — PHENYLEPHRINE HYDROCHLORIDE 50 MCG: 10 INJECTION, SOLUTION INTRAMUSCULAR; INTRAVENOUS; SUBCUTANEOUS at 09:37

## 2017-04-18 RX ADMIN — ACETAMINOPHEN 975 MG: 325 TABLET, FILM COATED ORAL at 18:49

## 2017-04-18 RX ADMIN — ROCURONIUM BROMIDE 50 MG: 10 INJECTION INTRAVENOUS at 08:20

## 2017-04-18 RX ADMIN — HYDROMORPHONE HYDROCHLORIDE 0.3 MG: 10 INJECTION, SOLUTION INTRAMUSCULAR; INTRAVENOUS; SUBCUTANEOUS at 16:56

## 2017-04-18 RX ADMIN — CEFAZOLIN SODIUM 1 G: 2 INJECTION, SOLUTION INTRAVENOUS at 10:58

## 2017-04-18 RX ADMIN — ALBUMIN (HUMAN): 12.5 SOLUTION INTRAVENOUS at 09:25

## 2017-04-18 RX ADMIN — AMLODIPINE BESYLATE 10 MG: 10 TABLET ORAL at 20:20

## 2017-04-18 RX ADMIN — HYDROMORPHONE HYDROCHLORIDE 0.3 MG: 10 INJECTION, SOLUTION INTRAMUSCULAR; INTRAVENOUS; SUBCUTANEOUS at 17:09

## 2017-04-18 RX ADMIN — SODIUM CHLORIDE, POTASSIUM CHLORIDE, SODIUM LACTATE AND CALCIUM CHLORIDE: 600; 310; 30; 20 INJECTION, SOLUTION INTRAVENOUS at 08:50

## 2017-04-18 RX ADMIN — PHENYLEPHRINE HYDROCHLORIDE 400 MCG: 10 INJECTION, SOLUTION INTRAMUSCULAR; INTRAVENOUS; SUBCUTANEOUS at 08:20

## 2017-04-18 RX ADMIN — ONDANSETRON 4 MG: 2 INJECTION INTRAMUSCULAR; INTRAVENOUS at 18:52

## 2017-04-18 RX ADMIN — PHENYLEPHRINE HYDROCHLORIDE 150 MCG: 10 INJECTION, SOLUTION INTRAMUSCULAR; INTRAVENOUS; SUBCUTANEOUS at 09:43

## 2017-04-18 RX ADMIN — PHENYLEPHRINE HYDROCHLORIDE 200 MCG: 10 INJECTION, SOLUTION INTRAMUSCULAR; INTRAVENOUS; SUBCUTANEOUS at 09:24

## 2017-04-18 RX ADMIN — FENTANYL CITRATE 100 MCG: 50 INJECTION, SOLUTION INTRAMUSCULAR; INTRAVENOUS at 09:30

## 2017-04-18 RX ADMIN — Medication 2000 UNITS: at 12:03

## 2017-04-18 RX ADMIN — SODIUM CHLORIDE, POTASSIUM CHLORIDE, SODIUM LACTATE AND CALCIUM CHLORIDE: 600; 310; 30; 20 INJECTION, SOLUTION INTRAVENOUS at 08:30

## 2017-04-18 RX ADMIN — ONDANSETRON 4 MG: 2 INJECTION INTRAMUSCULAR; INTRAVENOUS at 14:39

## 2017-04-18 RX ADMIN — PHENYLEPHRINE HYDROCHLORIDE 200 MCG: 10 INJECTION, SOLUTION INTRAMUSCULAR; INTRAVENOUS; SUBCUTANEOUS at 08:36

## 2017-04-18 RX ADMIN — PHENYLEPHRINE HYDROCHLORIDE 200 MCG: 10 INJECTION, SOLUTION INTRAMUSCULAR; INTRAVENOUS; SUBCUTANEOUS at 09:01

## 2017-04-18 RX ADMIN — PHENYLEPHRINE HYDROCHLORIDE 300 MCG: 10 INJECTION, SOLUTION INTRAMUSCULAR; INTRAVENOUS; SUBCUTANEOUS at 08:28

## 2017-04-18 RX ADMIN — Medication 0.2 MCG/KG/MIN: at 08:30

## 2017-04-18 RX ADMIN — FENTANYL CITRATE 100 MCG: 50 INJECTION, SOLUTION INTRAMUSCULAR; INTRAVENOUS at 08:20

## 2017-04-18 RX ADMIN — PHENYLEPHRINE HYDROCHLORIDE 200 MCG: 10 INJECTION, SOLUTION INTRAMUSCULAR; INTRAVENOUS; SUBCUTANEOUS at 08:47

## 2017-04-18 RX ADMIN — ROCURONIUM BROMIDE 20 MG: 10 INJECTION INTRAVENOUS at 12:03

## 2017-04-18 RX ADMIN — SODIUM CHLORIDE, POTASSIUM CHLORIDE, SODIUM LACTATE AND CALCIUM CHLORIDE: 600; 310; 30; 20 INJECTION, SOLUTION INTRAVENOUS at 12:19

## 2017-04-18 RX ADMIN — PHENYLEPHRINE HYDROCHLORIDE 200 MCG: 10 INJECTION, SOLUTION INTRAMUSCULAR; INTRAVENOUS; SUBCUTANEOUS at 09:11

## 2017-04-18 RX ADMIN — CLOPIDOGREL 300 MG: 75 TABLET, FILM COATED ORAL at 16:25

## 2017-04-18 RX ADMIN — PHENYLEPHRINE HYDROCHLORIDE 100 MCG: 10 INJECTION, SOLUTION INTRAMUSCULAR; INTRAVENOUS; SUBCUTANEOUS at 09:40

## 2017-04-18 RX ADMIN — FENTANYL CITRATE 50 MCG: 50 INJECTION INTRAMUSCULAR; INTRAVENOUS at 16:15

## 2017-04-18 RX ADMIN — Medication 5 MG: at 09:36

## 2017-04-18 RX ADMIN — PHENYLEPHRINE HYDROCHLORIDE 0.3 MCG/KG/MIN: 10 INJECTION, SOLUTION INTRAMUSCULAR; INTRAVENOUS; SUBCUTANEOUS at 09:28

## 2017-04-18 RX ADMIN — PROCHLORPERAZINE EDISYLATE 10 MG: 5 INJECTION INTRAMUSCULAR; INTRAVENOUS at 20:19

## 2017-04-18 RX ADMIN — ALBUTEROL SULFATE 8 PUFF: 90 AEROSOL, METERED RESPIRATORY (INHALATION) at 15:10

## 2017-04-18 RX ADMIN — Medication 5 MG: at 09:51

## 2017-04-18 RX ADMIN — PHENYLEPHRINE HYDROCHLORIDE 300 MCG: 10 INJECTION, SOLUTION INTRAMUSCULAR; INTRAVENOUS; SUBCUTANEOUS at 08:25

## 2017-04-18 RX ADMIN — SODIUM CHLORIDE, POTASSIUM CHLORIDE, SODIUM LACTATE AND CALCIUM CHLORIDE: 600; 310; 30; 20 INJECTION, SOLUTION INTRAVENOUS at 07:57

## 2017-04-18 RX ADMIN — TRAMADOL HYDROCHLORIDE 100 MG: 50 TABLET, COATED ORAL at 18:49

## 2017-04-18 RX ADMIN — PHENYLEPHRINE HYDROCHLORIDE 100 MCG: 10 INJECTION, SOLUTION INTRAMUSCULAR; INTRAVENOUS; SUBCUTANEOUS at 10:02

## 2017-04-18 RX ADMIN — Medication 6000 UNITS: at 09:56

## 2017-04-18 RX ADMIN — ROCURONIUM BROMIDE 20 MG: 10 INJECTION INTRAVENOUS at 10:40

## 2017-04-18 RX ADMIN — CEFAZOLIN SODIUM 2 G: 2 INJECTION, SOLUTION INTRAVENOUS at 09:00

## 2017-04-18 RX ADMIN — FENTANYL CITRATE 50 MCG: 50 INJECTION, SOLUTION INTRAMUSCULAR; INTRAVENOUS at 12:23

## 2017-04-18 RX ADMIN — MIDAZOLAM HYDROCHLORIDE 2 MG: 1 INJECTION, SOLUTION INTRAMUSCULAR; INTRAVENOUS at 08:05

## 2017-04-18 RX ADMIN — LIDOCAINE HYDROCHLORIDE 100 MG: 20 INJECTION, SOLUTION INFILTRATION; PERINEURAL at 08:20

## 2017-04-18 RX ADMIN — SODIUM CHLORIDE, POTASSIUM CHLORIDE, SODIUM LACTATE AND CALCIUM CHLORIDE: 600; 310; 30; 20 INJECTION, SOLUTION INTRAVENOUS at 16:10

## 2017-04-18 RX ADMIN — NOREPINEPHRINE BITARTRATE 0.03 MCG/KG/MIN: 1 INJECTION INTRAVENOUS at 09:08

## 2017-04-18 RX ADMIN — PHENYLEPHRINE HYDROCHLORIDE 100 MCG: 10 INJECTION, SOLUTION INTRAMUSCULAR; INTRAVENOUS; SUBCUTANEOUS at 09:51

## 2017-04-18 RX ADMIN — ROCURONIUM BROMIDE 50 MG: 10 INJECTION INTRAVENOUS at 13:07

## 2017-04-18 RX ADMIN — ROSUVASTATIN CALCIUM 10 MG: 10 TABLET, FILM COATED ORAL at 22:08

## 2017-04-18 RX ADMIN — CEFAZOLIN SODIUM 2 G: 2 INJECTION, SOLUTION INTRAVENOUS at 20:18

## 2017-04-18 RX ADMIN — FENTANYL CITRATE 100 MCG: 50 INJECTION, SOLUTION INTRAMUSCULAR; INTRAVENOUS at 13:09

## 2017-04-18 RX ADMIN — PROPOFOL 120 MG: 10 INJECTION, EMULSION INTRAVENOUS at 08:20

## 2017-04-18 ASSESSMENT — LIFESTYLE VARIABLES: TOBACCO_USE: 1

## 2017-04-18 ASSESSMENT — PAIN DESCRIPTION - DESCRIPTORS: DESCRIPTORS: ACHING;TIGHTNESS;CONSTANT

## 2017-04-18 NOTE — IP AVS SNAPSHOT
MRN:[id number]                      After Visit Summary   4/18/2017    Santos Rajan    MRN: [id number]           Thank you!     Thank you for choosing [facility] for your care. Our goal is always to provide you with excellent care. Hearing back from our patients is one way we can continue to improve our services. Please take a few minutes to complete the written survey that you may receive in the mail after you visit with us. Thank you!        Patient Information     Date Of Birth          1955        Designated Caregiver       Most Recent Value    Caregiver    Will someone help with your care after discharge? yes    Name of designated caregiver Abby    Phone number of caregiver [phone]    Caregiver address [address] [facility] [address]      About your hospital stay     You were admitted on:  April 18, 2017 You last received care in the:  [address] [facility]    You were discharged on:  April 20, 2017        Reason for your hospital stay       You underwent endo AAA repair with bilateral iliac artery stenting and left femoral artery endarterectomy with patch to treat your abdominal aortic aneurysm and lifestyle limiting claudication disease.                  Who to Call     For medical emergencies, please call 911.  For non-urgent questions about your medical care, please call your primary care provider or clinic, [phone]  For questions related to your surgery, please call your surgery clinic        Attending Provider     Provider Nelly Brower MD Vascular Surgery       Primary Care Provider Office Phone # Fax #    Roly Tomas PA-C [phone]       [facility] [place] [address]  [place] [address]         When to contact your care team       Contact Dr. Granados's office if any increased pain, swelling, drainage or fever develop.                  After Care Instructions     Activity       Your activity upon discharge: no heavy  lifting, pulling pushing or pulling over 10 lbs until seen by Dr. Granados.  No strenuous activity.    Elevate your legs when laying down to help swelling.            Diet       Follow this diet upon discharge: Regular            Discharge Instructions       Follow up with your primary physician next week for a post-hospitalization clinic visit.            Wound care and dressings       Instructions to care for your wound at home: please keep left prevena dressing in place.  Prevena dressing will be removed during clinic appointment next week.   If prevena dressing starts to beep or loose suction may remove dressing and contact Dr. Granados's office.                  Follow-up Appointments     Follow Up and recommended labs and tests       Follow up with DEANA Loera in one week with ABIs and follow up appointment with Dr. Granados in 2 weeks.  You will also have a follow up CT scan in one month which will be scheduled during your appointment with Dr. Granados.    With questions, concerns, or to request an appointment, please call either:    Ashley Pemberton, Care Coordinator RN, Vascular Surgery  [phone]    Vascular Call Center  [phone]    To contact someone after 5 pm, on a weekend, or on a Holiday, please call:  [facility]  [phone], option 4 to have a member of the Vascular Surgery Service paged.                  Your next 10 appointments already scheduled     Apr 25, 2017 10:00 AM CDT   US KIRILL DOPPLER NO EXERCISE with [facility] ([facility])    [address] [place] [id number] [phone]           Please bring a list of your medicines (including vitamins, minerals and over-the-counter drugs). Also, tell your doctor about any allergies you may have. Wear comfortable clothes and leave your valuables at home.  No caffeine or tobacco for 1 hour prior to exam.  Please call the Imaging  "Department at your exam site with any questions.            Apr 25, 2017 11:00 AM CDT   (Arrive by 10:45 AM)   New Vascular Visit with DEANA Young   [facility] ([facility])    [address] [place] [address]   [phone]            May 01, 2017  3:00 PM CDT   (Arrive by 2:45 PM)   Return Vascular Visit with Nelly Granados MD   [facility] ([facility])    [address] [place] [address]   [phone]              Additional Services     Tobacco Cessation c Referral       CALL IT QUITS REFERRAL PROGRAM    We would like to congratulate you on your efforts to stop using tobacco!  We would like to help you stop smoking and can refer you to the \"Call It Quits\" Program.  Below is some information about the program.      Free phone support can help you quit tobacco.  Thousands have quit successfully with help from a phone support program. People who use the program are more likely to be successful quitting tobacco than those who try to quit on their own.    How does phone support work?  With this program, you ll talk with a tobacco quitline . He or she will help you develop a quit plan that fits your situation. Through a series of phone calls    scheduled at your convenience   your quitline  will help you deal with cravings and stresses as you quit.    Is the program really free?  Everyone in [place]   whether or not they have health care coverage   is eligible for free phone support to quit tobacco.    What about confidentiality?  This program is confidential. The same laws that protect your medical information also protect any personal information you give to the tobacco quitline.    How do I get started?  1. Complete the referral form from your provider. It's your permission for a quitline  to call. Your provider may use an electronic version of " "the form; if so, you ll give a verbal okay to be called.  2. Your provider faxes the form to a quitline .  3. Your quitline  will call within a few days to answer questions, talk with you about your situation and get you started.    What should I do if no one contacts me?  If you have not heard from the quitline  within three business days, please call your provider.    Supporting your success.  Studies show a phone support program helps people quit tobacco. Nicotine replacement therapies   such as the patch or gum   in combination with support from a  quitline  increases quitting success even more.    The Call it Quits Referral Program is administered by the [place] Department of Health and supported by [place] Tobacco Quitlines, a collaboration among [place]'s major health plans ([employer] of [place], [employer], [employer], [employer] and [employer]) and [employer].    Call Ashley Vascular Surgery RN Care Coordinator at [phone] if you would like to enroll in the program.                  Future tests that were ordered for you     US KIRILL Doppler No Exercise                 Pending Results     Date and Time Order Name Status Description    4/18/2017 1354 Surgical pathology exam In process             Statement of Approval     Ordered          04/20/17 1025  I have reviewed and agree with all the recommendations and orders detailed in this document.  EFFECTIVE NOW     Approved and electronically signed by:  Citlaly Santo APRN CNS             Admission Information     Date & Time Provider Department Dept. Phone    4/18/2017 Nelly Granados MD [address] [facility] [place] [phone]      Your Vitals Were     Blood Pressure Pulse Temperature Respirations Height Weight    109/68 (BP Location: Left arm) 77 98.1  F (36.7  C) (Axillary) 18 1.778 m (5' 10\") 109 kg (240 lb 4.8 oz)    Pulse Oximetry BMI (Body Mass Index)                94% 34.48 kg/m2        "   Pops Information     Pops gives you secure access to your electronic health record. If you see a primary care provider, you can also send messages to your care team and make appointments. If you have questions, please call your primary care clinic.  If you do not have a primary care provider, please call [phone] and they will assist you.        Care EveryWhere ID     This is your Care EveryWhere ID. This could be used by other organizations to access your [facility] medical records  [id number]           Review of your medicines      START taking        Dose / Directions    * acetaminophen 325 MG tablet   Commonly known as:  TYLENOL        Dose:  975 mg   Take 3 tablets (975 mg) by mouth every 8 hours   Quantity:  100 tablet   Refills:  0       * acetaminophen 325 MG tablet   Commonly known as:  TYLENOL        Dose:  650 mg   Start taking on:  4/21/2017   Take 2 tablets (650 mg) by mouth every 4 hours as needed for other (surgical pain)   Quantity:  100 tablet   Refills:  0       cilostazol 50 MG tablet   Commonly known as:  PLETAL        Dose:  50 mg   Take 1 tablet (50 mg) by mouth 2 times daily   Quantity:  60 tablet   Refills:  3       clopidogrel 75 MG tablet   Commonly known as:  PLAVIX        Dose:  75 mg   Take 1 tablet (75 mg) by mouth daily   Quantity:  30 tablet   Refills:  3       sennosides 8.6 MG tablet   Commonly known as:  SENOKOT        Dose:  1-2 tablet   Take 1-2 tablets by mouth 2 times daily as needed for constipation   Quantity:  120 each   Refills:  0       * Notice:  This list has 2 medication(s) that are the same as other medications prescribed for you. Read the directions carefully, and ask your doctor or other care provider to review them with you.      CONTINUE these medicines which may have CHANGED, or have new prescriptions. If we are uncertain of the size of tablets/capsules you have at home, strength may be listed as something that might have changed.        Dose /  Directions    amLODIPine-benazepril 10-40 MG per capsule   Commonly known as:  LOTREL   This may have changed:  when to take this   Used for:  Benign essential hypertension        Dose:  1 capsule   Take 1 capsule by mouth daily   Quantity:  90 capsule   Refills:  1       aspirin 81 MG EC tablet   This may have changed:  when to take this   Used for:  Coronary atherosclerosis due to lipid rich plaque        Dose:  81 mg   Take 1 tablet (81 mg) by mouth daily   Quantity:  60 tablet   Refills:  3       metoprolol 25 MG 24 hr tablet   Commonly known as:  TOPROL-XL   This may have changed:  when to take this   Used for:  Coronary atherosclerosis due to lipid rich plaque        Dose:  25 mg   Take 1 tablet (25 mg) by mouth daily   Quantity:  30 tablet   Refills:  3         CONTINUE these medicines which have NOT CHANGED        Dose / Directions    albuterol 108 (90 BASE) MCG/ACT Inhaler   Commonly known as:  PROAIR HFA/PROVENTIL HFA/VENTOLIN HFA   Used for:  Acute bronchitis, unspecified organism        Dose:  2 puff   Inhale 2 puffs into the lungs every 6 hours as needed for shortness of breath / dyspnea or wheezing   Quantity:  1 Inhaler   Refills:  1       benzonatate 200 MG capsule   Commonly known as:  TESSALON   Used for:  Leukocytosis, unspecified type        Dose:  200 mg   Take 1 capsule (200 mg) by mouth 3 times daily as needed for cough   Quantity:  30 capsule   Refills:  0       hydrocortisone 2.5 % cream        APPLY TO FACE TOPICALLY BID   Refills:  3       nicotine 21 MG/24HR 24 hr patch   Commonly known as:  NICODERM CQ   Used for:  Tobacco abuse        Dose:  1 patch   Place 1 patch onto the skin every 24 hours   Quantity:  30 patch   Refills:  1       rosuvastatin 10 MG tablet   Commonly known as:  CRESTOR        Take 1 tablet by mouth at bedtime   Refills:  1       traMADol 50 MG tablet   Commonly known as:  ULTRAM   Used for:  Hip pain, left        Dose:   mg   Take 1-2 tablets ( mg) by  mouth every 6 hours as needed for pain   Quantity:  50 tablet   Refills:  0         STOP taking     levofloxacin 500 MG tablet   Commonly known as:  LEVAQUIN           naproxen 500 MG tablet   Commonly known as:  NAPROSYN                Where to get your medicines      These medications were sent to [facility] - [place], [place] - [address] [facility]  [address] [facility] [address]     Phone:  [phone]     cilostazol 50 MG tablet    clopidogrel 75 MG tablet    nicotine 21 MG/24HR 24 hr patch         Some of these will need a paper prescription and others can be bought over the counter. Ask your nurse if you have questions.     Bring a paper prescription for each of these medications     traMADol 50 MG tablet       You don't need a prescription for these medications     acetaminophen 325 MG tablet    acetaminophen 325 MG tablet    sennosides 8.6 MG tablet                Protect others around you: Learn how to safely use, store and throw away your medicines at www.disposemymeds.org.             Medication List: This is a list of all your medications and when to take them. Check marks below indicate your daily home schedule. Keep this list as a reference.      Medications           Morning Afternoon Evening Bedtime As Needed    * acetaminophen 325 MG tablet   Commonly known as:  TYLENOL   Take 3 tablets (975 mg) by mouth every 8 hours   Last time this was given:  975 mg on 4/20/2017 11:11 AM   Next Dose Due:  Anytime                                  * acetaminophen 325 MG tablet   Commonly known as:  TYLENOL   Take 2 tablets (650 mg) by mouth every 4 hours as needed for other (surgical pain)   Start taking on:  4/21/2017   Last time this was given:  975 mg on 4/20/2017 11:11 AM                                albuterol 108 (90 BASE) MCG/ACT Inhaler   Commonly known as:  PROAIR HFA/PROVENTIL HFA/VENTOLIN HFA   Inhale 2 puffs into the lungs every 6 hours as needed for shortness of breath /  dyspnea or wheezing   Last time this was given:  8 puffs on 4/18/2017  3:10 PM                                amLODIPine-benazepril 10-40 MG per capsule   Commonly known as:  LOTREL   Take 1 capsule by mouth daily                                aspirin 81 MG EC tablet   Take 1 tablet (81 mg) by mouth daily   Last time this was given:  81 mg on 4/20/2017  8:49 AM   Next Dose Due:  tomorrow                                   benzonatate 200 MG capsule   Commonly known as:  TESSALON   Take 1 capsule (200 mg) by mouth 3 times daily as needed for cough                                cilostazol 50 MG tablet   Commonly known as:  PLETAL   Take 1 tablet (50 mg) by mouth 2 times daily   Last time this was given:  50 mg on 4/20/2017  8:49 AM   Next Dose Due:  Tomorrow                                   clopidogrel 75 MG tablet   Commonly known as:  PLAVIX   Take 1 tablet (75 mg) by mouth daily   Last time this was given:  75 mg on 4/20/2017  8:49 AM   Next Dose Due:  Tomorrow                                   hydrocortisone 2.5 % cream   APPLY TO FACE TOPICALLY BID                                metoprolol 25 MG 24 hr tablet   Commonly known as:  TOPROL-XL   Take 1 tablet (25 mg) by mouth daily   Last time this was given:  25 mg on 4/19/2017  8:49 PM                                nicotine 21 MG/24HR 24 hr patch   Commonly known as:  NICODERM CQ   Place 1 patch onto the skin every 24 hours   Last time this was given:  1 patch on 4/19/2017  9:54 PM                                rosuvastatin 10 MG tablet   Commonly known as:  CRESTOR   Take 1 tablet by mouth at bedtime   Last time this was given:  10 mg on 4/19/2017  8:50 PM   Next Dose Due:  Tonight                                     sennosides 8.6 MG tablet   Commonly known as:  SENOKOT   Take 1-2 tablets by mouth 2 times daily as needed for constipation   Last time this was given:  8.6 mg on 4/20/2017 11:11 AM   Next Dose Due:  Tonight                                         traMADol 50 MG tablet   Commonly known as:  ULTRAM   Take 1-2 tablets ( mg) by mouth every 6 hours as needed for pain   Last time this was given:  100 mg on 4/20/2017  3:17 AM   Next Dose Due:  Anytime                                  * Notice:  This list has 2 medication(s) that are the same as other medications prescribed for you. Read the directions carefully, and ask your doctor or other care provider to review them with you.              More Information        Eating Heart-Healthy Foods  Eating has a big impact on your heart health. In fact, eating healthier can improve several of your heart risks at once. For instance, it helps you manage weight, cholesterol, and blood pressure. Here are ideas to help you make heart-healthy changes without giving up all the foods and flavors you love.  Getting started    Talk with your health care provider about eating plans, such as the DASH or Mediterranean diet. You may also be referred to a dietitian.    Change a few things at a time. Give yourself time to get used to a few eating changes before adding more.    Work to create a tasty, healthy eating plan that you can stick to for the rest of your life.    Goals for healthy eating  Below are some tips to improve your eating habits:    Limit saturated fats and trans fats. Saturated fats raise your levels of cholesterol, so keep these fats to a minimum. They are found in foods such as fatty meats, whole milk, cheese, and palm and coconut oils. Avoid trans fats because they lower good cholesterol as well as raise bad cholesterol. Trans fats are most often found in processed foods.    Reduce sodium (salt) intake. Eating too much salt may increase your blood pressure. Limit your sodium intake to 2,300 milligrams (mg) per day, or less if your health care provider recommends it. Dining out less often and eating fewer processed foods are two great ways to decrease the amount of salt you consume.    Managing calories. A  calorie is a unit of energy. Your body burns calories for fuel, but if you eat more calories than your body burns, the extras are stored as fat. Your health care provider can help you create a diet plan to manage your calories. This will likely include eating healthier foods as well as exercising regularly. To help you track your progress, keep a diary to record what you eat and how often you exercise.  Choose the right foods  Aim to make these foods staples of your diet. If you have diabetes, you may have different recommendations than what is listed here:    Fruits and vegetable provide plenty of nutrients without a lot of calories. At meals, fill half your plate with these foods. Split the other half of your plate between whole grains and lean protein.    Whole grains are high in fiber and rich in vitamins and nutrients. Good choices include whole-wheat bread, pasta, and brown rice.    Lean proteins give you nutrition with less fat. Good choices include fish, skinless chicken, and beans.    Low-fat or nonfat dairy provides nutrients without a lot of fat. Try low-fat or nonfat milk, cheese, or yogurt.    Healthy fats can be good for you in small amounts. These are unsaturated fats, such as olive oil, nuts, and fish. Try to have at least 2 servings per week of fatty fish such as salmon, sardines, mackerel, rainbow trout, and albacore tuna. These contain omega-3 fatty acids, which are good for your heart. Flaxseed is another source of a heart-healthy fat.  More on heart healthy eating    Read food labels  Healthy eating starts at the grocery store. Be sure to pay attention to food labels on packaged foods. Look for products that are high in fiber and protein, and low in saturated fat, cholesterol, and sodium. Avoid products that contain trans fat. And pay close attention to serving size. For instance, if you plan to eat two servings, double all the numbers on the label.  Prepare food right  A key part of healthy  cooking is cutting down on added fat and salt. Look on the internet for lower-fat, lower-sodium recipes. Also, try these tips:    Remove fat from meat and skin from poultry before cooking.    Skim fat from the surface of soups and sauces.    Broil, boil, bake, steam, grill, and microwave food without added fats.    Choose ingredients that spice up your food without adding calories, fat, or sodium. Try these items: horseradish, hot sauce, lemon, mustard, nonfat salad dressings, and vinegar. For salt-free herbs and spices, try basil, cilantro, cinnamon, pepper, and rosemary.    2161-7238 [employer]. [address] [place] [address], [place] [address]. All rights reserved. This information is not intended as a substitute for professional medical care. Always follow your healthcare professional's instructions.                Exercise for a Healthier Heart  You may wonder how you can improve the health of your heart. If you re thinking about exercise, you re on the right track. You don t need to become an athlete, but you do need a certain amount of brisk exercise to help strengthen your heart. If you have been diagnosed with a heart condition, your doctor may recommend exercise to help stabilize your condition. To help make exercise a habit, choose safe, fun activities.     Exercise with a friend. When activity is fun, you're more likely to stick with it.     Be sure to check with your health care provider before starting an exercise program.   Why exercise?  Exercising regularly offers many healthy rewards. It can help you do all of the following:    Improve your blood cholesterol levels to help prevent further heart trouble    Lower your blood pressure to help prevent a stroke or heart attack    Control diabetes, or reduce your risk of getting this disease    Improve your heart and lung function    Reach and maintain a healthy weight    Make your muscles stronger and more limber so you can stay active    Prevent  falls and fractures by slowing the loss of bone mass (osteoporosis)    Manage stress better  Exercise tips  Ease into your routine. Set small goals. Then build on them.  Exercise on most days. Aim for a total of 150 or more minutes of moderate to  vigorous intensity activity each week. Consider 40 minutes, 3 to 4 times a week. For best results, activity should last for 40 minutes on average. It is OK to work up to the 40 minute period over time. Examples of moderate-intensity activity is walking one mile in 15 minutes or 30 to 45 minutes of yard work.  Step up your daily activity level. Along with your exercise program, try being more active throughout the day. Walk instead of drive. Do more household tasks or yard work.  Choose one or more activities you enjoy. Walking is one of the easiest things you can do. You can also try swimming, riding a bike, or taking an exercise class.  Stop exercising and call your doctor if you:    Have chest pain or feel dizzy or lightheaded    Feel burning, tightness, pressure, or heaviness in your chest, neck, shoulders, back, or arms    Have unusual shortness of breath    Have increased joint or muscle pain    Have palpitations or an irregular heartbeat     8350-1113 [employer]. [address] [place], [place] [id number]. All rights reserved. This information is not intended as a substitute for professional medical care. Always follow your healthcare professional's instructions.                Kicking the Smoking Habit  If you smoke, quitting is one of the best changes you can make for your heart. Your risk of heart attack goes down within one day of putting out that last cigarette. As you go longer without smoking, your risk goes down even more. Quitting isn t easy, but millions of people have done it. You can, too. It s never too late to quit.  Getting started  Boost your chances of success by deciding on your  quit plan.  Your health care provider and cardiac rehab  team can help you develop this plan. Even if you ve already quit, it s easy to slip back into smoking.  Your plan can help you avoid and recover from relapse.  In any case, start by setting a date to quit within a month, and do it.    Keys to your quit plan    Talk to your health care provider about prescription medications and nicotine replacement products that help stop the urge to smoke.     Join a support group or quit smoking program. Talking with others about the challenges of quitting can help you get through them.    Ask other smokers in your household to quit with you.  Track your triggers  What gives you that  L-xmgq-a-cigarette  feeling? List all the situations that make you want a cigarette. Then think of other ways to deal with these situations. Here are some examples:  Situation How I'll handle it   Finishing a meal Get up from the table and take a walk   Having an argument Find a quiet place and breathe deeply   Feeling lonely or bored Call a friend to talk   Tips for quitting successfully    List the benefits of quitting such as reducing heart risks and saving money. Keep this list and review it whenever you feel like smoking.    Get support. Let your friends know you may call them to chat when you have an urge to smoke.    If you ve tried to quit before without success, this time avoid the triggers that may cause the relapse.    Make the most of slip-ups. Try to learn from them, and then get back on track.  For family and friends    Be supportive and patient. Quitting smoking can be difficult and stressful.    If you smoke, now s a great time to quit. Even if you don t quit, never smoke around your loved one. Secondhand smoke is dangerous to his or her heart.    0002-6937 [employer]. [address] [place] [address], [place] [address]. All rights reserved. This information is not intended as a substitute for professional medical care. Always follow your healthcare professional's  instructions.                Pain Management After Surgery  Once you re home after surgery, you may have some pain, since even minor surgery causes swelling and breakdown of tissue. When it comes to effective pain management, the tips you may have learned in the hospital also work at home. To get the best pain relief possible, remember these points:  Special note: Be sure to follow any specific post-operative instructions from your surgeon or nurse.     Use your medicine only as directed    If your pain is not relieved or if it gets worse, call your health care provider.    If pain lessens, try taking your medicine less often or in smaller doses.  Remember that medicines need time to work    Most pain relievers taken by mouth need at least 20 to 30 minutes to take effect. They may not reach their maximum effect for close to an hour.     Take pain medicine at regular times as directed. Don t wait until the pain gets bad to take it.  Time your medicine    Try to time your medicine so that you take it before beginning an activity, such as dressing or sitting at the table for dinner.    Taking your medicine at night may help you get a good night s rest.  Eat lots of fruit and vegetables    Constipation is a common side effect with some pain medicines. Eating fruit, vegetables, and other foods high in fiber can help.    Drink lots of fluids.    Talk to your health care provider about taking a preventive bowel regimen.  Avoid drinking alcohol while taking pain medicine    Mixing alcohol and pain medicine can cause dizziness and slow your respiratory system. It can even be fatal.  Avoid driving or operating machinery while taking pain medicines that can cause drowsiness.    0227-4997 [employer]. [address] [place] [address], [place] [address]. All rights reserved. This information is not intended as a substitute for professional medical care. Always follow your healthcare professional's  instructions.                After Endovascular Abdominal Aortic Aneurysm Repair  You have had a procedure to repair an abdominal aortic aneurysm (AAA), which occurred when a weakened part of a blood vessel in your abdominal area expanded like a balloon. During an endovascular repair, your doctor created two small incisions near your groin. A thin, flexible tube (called a catheter) was threaded into the artery at the incision. A graft was placed inside the catheter and guided toward the damaged part of your aorta to prevent further problems.  Home care    Avoid strenuous activity for 7 to 10 days after your surgery.    Ask your doctor when you can expect to return to work.    Gradually increase your activity. It may take some time for you to return to your normal activities.    Don t drive for 2 weeks after surgery. Ask someone to take you to any appointments.    Check your incision every day for signs of infection (swelling, redness, drainage, or warmth).    Keep your incision clean. Wash it gently with soap and water while you shower.    Don t swim or use a hot tub until your doctor says it is OK.    Don t lift anything heavier than 5 pounds for 4 weeks after surgery.    Avoid sitting or standing for long periods without moving your legs and feet.    Keep your feet up when you sit in a chair.    Take your medications exactly as directed. Don t skip doses.  When to call your doctor  Call your doctor right away if you have any of the following:    Redness, pain, swelling, or drainage from your incision    Fever above 101.0 F (38.3 C)    Sudden coldness, pain, or paleness in your leg    Loss of feeling in your legs    Severe or sudden abdominal pain    Nausea or vomiting    Trouble breathing    Pain or heaviness in your chest or arms    Any unusual bleeding  Follow-up    Make a follow-up appointment to have your incisions checked and staples removed within 7 to 10 days.    Make follow-up appointments as directed.     [id number] [employer]. [address] [place] [address], [place] [address]. All rights reserved. This information is not intended as a substitute for professional medical care. Always follow your healthcare professional's instructions.

## 2017-04-18 NOTE — OR NURSING
PreOp labs were drawn yesterday in Clinic. Blood bank has 3 units PRBCs available. INR drawn today.

## 2017-04-18 NOTE — ANESTHESIA CARE TRANSFER NOTE
Patient: Santos Rajan    Procedure(s):  Ultra sound guided left/right femoral arterial and left brachial artery access.  Endovascular Abdomnal Aortic  Aneurysm Repair with Endologix AFX2 Bifurcated Endograft system and MORALES   Proximal Endograft System.  Left common and external iliac artery stents using [employer] Epic Vascular Self-expanding stent system. Express LD iliac/biliary premounted stent system to the left and right common iliac arteries. Left femoral artery endarterectomy with Le[employer]aitre Vascular biologiic patch.    - Wound Class: III-Contaminated    Diagnosis: Abdominal Aortic Aneursym   Diagnosis Additional Information: No value filed.    Anesthesia Type:   No value filed.     Note:  Airway :Face Mask  Patient transferred to:PACU  Comments: Anesthesia Care Transfer Note    Patient: Santos Rajan    Transferred to: PACU    Patient vital signs: stable    Airway: none    Monitors on, VSS, pt. Stable, Report given to PACU RN.     Manod Gudino CRNA  4/18/2017 3:41 PM            Vitals: (Last set prior to Anesthesia Care Transfer)    CRNA VITALS  4/18/2017 1506 - 4/18/2017 1541      4/18/2017             Resp Rate (set): 10                Electronically Signed By: DEANA Dunn CRNA  April 18, 2017  3:41 PM

## 2017-04-18 NOTE — ANESTHESIA PROCEDURE NOTES
Central Line Procedure Note  Staff:     Anesthesiologist:  CHANCE PASCUAL  Location: In OR after induction  Procedure Start/Stop Times:     patient identified, IV checked, site marked, risks and benefits discussed, informed consent, monitors and equipment checked, pre-op evaluation and at physician/surgeon's request      Correct Patient: Yes      Correct Position: Yes      Correct Site: Yes      Correct Procedure: Yes      Correct Laterality:  Yes    Site Marked:  Yes  Line Placement:     Procedure:  Central Line    Insertion laterality:  Right    Insertion site:  Internal Jugular    Position:  Supine      Maximal Sterile Barriers: All elements of maximal sterile barrier technique followed      (Maximal sterile barriers include:   Sterile gown, Sterile Gloves, Mask, Cap, Whole body draped, hand hygiene and acceptable skin prep).Skin Prep: Chloraprep         Injection Technique:  Ultrasound guided    Sterile Ultrasound Technique:  Sterile probe cover and Sterile gel    Vein evaluated via U/S for patency/adequacy of catheter insertion and is adequate.  Using realtime U/S imaging the vein was punctured, and needle was observed entering vein on U/S      Permanent Image entered into patient's record      Local skin infiltration:  None    Catheter size:  7 Fr, 3 lumen, 20 cm    Catheter length at skin (cm):  15    Cath secured with: suture      Dressing:  Tegaderm    Complications:  None obvious    Blood aspirated all lumens: Yes      All Lumens Flushed: Yes      Verification method:  Placement to be verified post-op

## 2017-04-18 NOTE — BRIEF OP NOTE
[facility], [place]    Brief Operative Note    Pre-operative diagnosis: Abdominal Aortic Aneursym; lifestyle limiting claudication from aortoiliac occlusive disease  Post-operative diagnosis Same  Procedure: Procedure(s):  Ultra sound guided left/right femoral arterial and left brachial artery access.  Endovascular Abdomnal Aortic  Aneurysm Repair with Endologix AFX2 Bifurcated Endograft system and MORALES   Proximal Endograft System.  Left common and external iliac artery stents using [employer] Epic Vascular Self-expanding stent system. Express LD iliac/biliary premounted stent system to the left and right common iliac arteries. Left femoral artery endarterectomy with LeMaitre Vascular biologiic patch.    - Wound Class: III-Contaminated  Surgeon: Surgeon(s) and Role:     * Nelly Granados MD - Primary     * Jay Gonzalez MD - Assisting  Anesthesia: General   Estimated blood loss: 200 ml  Drains:  [place]  Specimens:   ID Type Source Tests Collected by Time Destination   A : Left Femoral Plaque Tissue Other SURGICAL PATHOLOGY EXAM Nelly Granados MD 4/18/2017  1:53 PM      Findings:   Completion angiogram without endoleak; palpable pedal pulses bilaterally at end of case.  Complications: None.  Implants: Materials Involved:  Endologix endograft, [employer] stents, and bovine pericardium

## 2017-04-18 NOTE — ANESTHESIA PROCEDURE NOTES
Arterial Line Procedure Note  Staff:     Anesthesiologist:  CHANCE PASCUAL  Location: In OR After Induction  Procedure Start/Stop Times:     patient identified, IV checked, site marked, risks and benefits discussed, informed consent, monitors and equipment checked, pre-op evaluation and at physician/surgeon's request      Correct Patient: Yes      Correct Position: Yes      Correct Site: Yes      Correct Procedure: Yes      Correct Laterality:  Yes    Site Marked:  Yes  Line Placement:     Procedure:  Arterial Line    Insertion Site:  Radial    Insertion laterality:  Right    Skin Prep: Chloraprep      Patient Prep: patient draped, mask and sterile gloves      Local skin infiltration:  None    Ultrasound Guided?: Yes      Artery evaluated via ultrasound confirming patency.   Using realtime imaging, the artery was punctured and the needle was observed entering the artery.      A permanent image is entered into patient's chart.      Catheter size:  20 gauge, 12 cm    Cath secured with: suture      Dressing:  Tegaderm    Complications:  None obvious    Arterial waveform: Yes      IBP within 10% of NIBP: Yes

## 2017-04-18 NOTE — LETTER
"Transition Communication Hand-off for Care Transitions to Next Level of Care Provider    Name: Santos Rajan  MRN #: [id number]  Primary Care Provider: Roly Tomas     Primary Clinic:  [facility] [address] [place] [address]     Reason for Hospitalization:  Abdominal Aortic Aneursym   AAA (abdominal aortic aneurysm) without rupture (H)  Aorto-iliac atherosclerosis (H)  Admit Date/Time: 4/18/2017  6:04 AM  Discharge Date: 4/20/17  Payor Source: Payor: BCBS / Plan: COMPREHENSIVE CARE SERVICE/BLUE LINK / Product Type: PPO /     Readmission Assessment Measure (REGAN) Risk Score/category: Low           Reason for Communication Hand-off Referral: Recent hospitalization    Discharge Plan: D/C to home with Wife and Out Patient follow up       Concern for non-adherence with plan of care: No      Follow-up plan:  Future Appointments  Date Time Provider Department Center   4/25/2017 10:00 AM [id number]   4/25/2017 11:00 AM Citlaly Santo APRN [facility]   5/1/2017 3:00 PM Nelly Granados MD [facility]       Any outstanding tests or procedures:        Radiology & Cardiology Orders     Future Labs/Procedures Complete By Expires    US KIRILL Doppler No Exercise  4/19/2017 (Approximate) 4/19/2018        Referrals     Future Labs/Procedures    Tobacco Cessation c Referral     Comments:    CALL IT QUITS REFERRAL PROGRAM    We would like to congratulate you on your efforts to stop using tobacco!  We would like to help you stop smoking and can refer you to the \"Call It Quits\" Program.  Below is some information about the program.      Free phone support can help you quit tobacco.  Thousands have quit successfully with help from a phone support program. People who use the program are more likely to be successful quitting tobacco than those who try to quit on their own.    How does phone support work?  With this program, you ll talk with a tobacco quitline . He or she will help " you develop a quit plan that fits your situation. Through a series of phone calls --  scheduled at your convenience -- your quitline  will help you deal with cravings and stresses as you quit.    Is the program really free?  Everyone in [place] -- whether or not they have health care coverage -- is eligible for free phone support to quit tobacco.    What about confidentiality?  This program is confidential. The same laws that protect your medical information also protect any personal information you give to the tobacco quitline.    How do I get started?  1. Complete the referral form from your provider. It's your permission for a quitline  to call. Your provider may use an electronic version of the form; if so, you ll give a verbal okay to be called.  2. Your provider faxes the form to a quitline .  3. Your quitline  will call within a few days to answer questions, talk with you about your situation and get you started.    What should I do if no one contacts me?  If you have not heard from the quitline  within three business days, please call your provider.    Supporting your success.  Studies show a phone support program helps people quit tobacco. Nicotine replacement therapies -- such as the patch or gum -- in combination with support from a  quitline  increases quitting success even more.    The Call it Quits Referral Program is administered by the [place] Department of Health and supported by [place] Tobacco Quitlines, a collaboration among [place]'s major health plans ([employer] of [place], [employer], [employer], [employer] and [facility]) and [facility].    Call [facility] RN Care Coordinator at [phone] if you would like to enroll in the program.            Lien A. Liv RN  6B care coordinator #[phone]

## 2017-04-18 NOTE — IP AVS SNAPSHOT
[address] [address]    Phone:  [phone]                                       After Visit Summary   4/18/2017    Santos Rajan    MRN: [id number]           After Visit Summary Signature Page     I have received my discharge instructions, and my questions have been answered. I have discussed any challenges I see with this plan with the nurse or doctor.    ..........................................................................................................................................  Patient/Patient Representative Signature      ..........................................................................................................................................  Patient Representative Print Name and Relationship to Patient    ..................................................               ................................................  Date                                            Time    ..........................................................................................................................................  Reviewed by Signature/Title    ...................................................              ..............................................  Date                                                            Time

## 2017-04-18 NOTE — OR NURSING
Post-op CXR for line placement viewed by Dr. Felix, anesthesia.  Post-op lab results and 12lead EKG seen by Dr. Granados, RN

## 2017-04-18 NOTE — ANESTHESIA POSTPROCEDURE EVALUATION
Patient: Santos Rajan    Procedure(s):  Ultra sound guided left/right femoral arterial and left brachial artery access.  Endovascular Abdomnal Aortic  Aneurysm Repair with Endologix AFX2 Bifurcated Endograft system and MORALES   Proximal Endograft System.  Left common and external iliac artery stents using [employer] Epic Vascular Self-expanding stent system. Express LD iliac/biliary premounted stent system to the left and right common iliac arteries. Left femoral artery endarterectomy with LeMaitre Vascular biologiic patch.    - Wound Class: III-Contaminated    Diagnosis:Abdominal Aortic Aneursym   Diagnosis Additional Information: No value filed.    Anesthesia Type:  No value filed.    Note:  Anesthesia Post Evaluation    Patient location during evaluation: PACU  Patient participation: Able to fully participate in evaluation  Level of consciousness: awake and alert  Pain management: adequate  Airway patency: patent  Cardiovascular status: acceptable and hemodynamically stable  Respiratory status: acceptable  Hydration status: acceptable  PONV: none     Anesthetic complications: None          Last vitals:  Vitals:    04/18/17 1538 04/18/17 1545 04/18/17 1600   BP: 134/82 127/87    Pulse:      Resp: 12 16 16   Temp:      SpO2: 98% 99% 97%         Electronically Signed By: Doni Felix MD  April 18, 2017  4:10 PM

## 2017-04-18 NOTE — ANESTHESIA PREPROCEDURE EVALUATION
Anesthesia Evaluation     . Pt has had prior anesthetic. Type: General and MAC    No history of anesthetic complications          ROS/MED HX    ENT/Pulmonary:     (+)RAYNE risk factors hypertension, tobacco use, Current use half to 1 PPD for 40 years packs/day  , recent URI unresolved curently having cough and elevated WBC, has gotten better according to patient, Laureano initiated: . .    Neurologic:     (+)other neuro meningioma followed by Lizbeth    Cardiovascular: Comment: AAA 5.1 cm, bilateral iliac disease    (+) Dyslipidemia, hypertension-Peripheral Vascular Disease-- Other and Symptomatic, CAD, --. Taking blood thinners Pt has received instructions: Instructions Given to patient: ASA 81 mg at HS, will remain on. . . :. . Previous cardiac testing date:results:Stress Testdate:3/2/17 results:Impression  1. Myocardial perfusion imaging using single isotope technique demonstrated a small to moderately sized reversible inferior wall defect suggesting myocardial ischemia. The adequacy of this finding is likely reduced due to the presence of diaphragmatic attenuation.  2. Gated images demonstrated normal wall motion. The left ventricular systolic function is normal with a calculated ejection fraction of 65%. There are no previous studies for comparison .     ECG reviewed date:3/21/17 results:SR with RBBBCath date: 3/21/17 results:2 vessel CAD, TO RCA and occlusion LCx (OM 99% occluded).          METS/Exercise Tolerance:  3 - Able to walk 1-2 blocks without stopping   Hematologic:     (+) Other Hematologic Disorder-ANTI-Nissa blood antibody     (-) History of Transfusion   Musculoskeletal:   (+) arthritis, , , other musculoskeletal- chronic back pain      GI/Hepatic:  - neg GI/hepatic ROS       Renal/Genitourinary:  - ROS Renal section negative       Endo:     (+) Obesity, .      Psychiatric:  - neg psychiatric ROS       Infectious Disease:  - neg infectious disease ROS       Malignancy:      - no malignancy   Other:     (+) No chance of pregnancy C-spine cleared: N/A, H/O Chronic Pain,no other significant disability                     Allergies   Allergen Reactions     Blood Transfusion Related (Informational Only) Other (See Comments)     Patient has a history of a clinically significant antibody against RBC antigens.  A delay in compatible RBCs may occur.     Codeine      Patient reports mom had severe reaction to codeine     Prescription Medications as of 4/18/2017             hydrocortisone 2.5 % cream APPLY TO FACE TOPICALLY BID    rosuvastatin (CRESTOR) 10 MG tablet TK 1 T PO D    benzonatate (TESSALON) 200 MG capsule Take 1 capsule (200 mg) by mouth 3 times daily as needed for cough    albuterol (PROAIR HFA/PROVENTIL HFA/VENTOLIN HFA) 108 (90 BASE) MCG/ACT Inhaler Inhale 2 puffs into the lungs every 6 hours as needed for shortness of breath / dyspnea or wheezing    levofloxacin (LEVAQUIN) 500 MG tablet Take 1 tablet (500 mg) by mouth daily    aspirin EC 81 MG EC tablet Take 1 tablet (81 mg) by mouth daily    metoprolol (TOPROL-XL) 25 MG 24 hr tablet Take 1 tablet (25 mg) by mouth daily    amLODIPine-benazepril (LOTREL) 10-40 MG per capsule Take 1 capsule by mouth daily    traMADol (ULTRAM) 50 MG tablet Take 1-2 tablets ( mg) by mouth every 6 hours as needed for pain    naproxen (NAPROSYN) 500 MG tablet Take 1 tablet (500 mg) by mouth 2 times daily (with meals)    nicotine (NICODERM CQ) 21 MG/24HR patch 2h hr Place 1 patch onto the skin every 24 hours      Facility Administered Medications as of 4/18/2017             ceFAZolin sodium-dextrose (ANCEF) infusion 2 g Inject 100 mLs (2 g) into the vein Pre-Op/Pre-procedure x 1 dose    lactated ringers infusion Inject into the vein continuous    lidocaine 1 % 1 mL 1 mL by Other route every hour as needed (mild pain with VAD insertion or accessing implanted port)    lidocaine (LMX4) kit Apply topically every hour as needed for pain (with VAD insertion or accessing implanted  port.)    sodium chloride (PF) 0.9% PF flush 3 mL 3 mLs by Intracatheter route every hour as needed for line flush (for peripheral IV flush post IV meds)    sodium chloride (PF) 0.9% PF flush 3 mL 3 mLs by Intracatheter route every 8 hours    EPINEPHrine (ADRENALIN) 5 mg in NaCl 0.9 % 250 mL infusion Inject 3.27-32.7 mcg/min into the vein continuous    norepinephrine (LEVOPHED) 16 mg in D5W 250 mL infusion Inject 3.27-43.6 mcg/min into the vein continuous    phenylephrine (TODD-SYNEPHRINE) injection 1 mg Inject 1 mg into the vein continuous prn    phenylephrine 10 mg in 0.9% NaCl 100 mL IVPB infusion continuous prn    midazolam (VERSED) injection Inject into the vein as needed for anxiety    fentaNYL Citrate (PF) (SUBLIMAZE) injection Inject into the vein as needed for moderate to severe pain    lidocaine injection 2% (MDV) Inject into the vein as needed    propofol (DIPRIVAN) injection 10 mg/mL vial Inject into the vein as needed    rocuronium (ZEMURON) injection Inject into the vein as needed    phenylephrine (TODD-SYNEPHRINE) 50 mg in NaCl 0.9 % 250 mL infusion Inject 54.5-654 mcg/min into the vein continuous    lactated ringers infusion continuous prn    albumin human 5 % injection continuous prn for other    ePHEDrine injection Inject into the vein as needed    heparin (porcine) injection as needed for line flush    heparin 5,000 units in 500 mL 0.9% sodium chloride as needed    oxidized cellulose (SURGICEL) pad as needed    ondansetron (ZOFRAN) injection Inject into the vein as needed for nausea or vomiting    albuterol (PROAIR HFA/PROVENTIL HFA/VENTOLIN HFA) Inhaler as needed for wheezing    sugammadex (BRIDION) injection as needed    iopamidol (ISOVUE-370) solution as needed    bupivacaine 0.25 % - EPINEPHrine 1:200,000 (PF) injection as needed      No results found for this or any previous visit (from the past 4320 hour(s)).  PAST MEDICAL HISTORY:   Past Medical History:   Diagnosis Date     AAA (abdominal  aortic aneurysm) (H)      Aortoiliac occlusive disease (H)      Arthritis 1995    knee and hands     Benign essential hypertension 6/14/2016     Brain tumor (benign) (H)     Being monitored, [place]      Diverticula of colon     No surgery      Red blood cell antibody positive     Anti-[place]     Umbilical hernia 9/1/2015       PAST SURGICAL HISTORY:   Past Surgical History:   Procedure Laterality Date     ABDOMEN SURGERY  1998    Gall bladder     CHOLECYSTECTOMY  1998     COLONOSCOPY  2015     DENTAL SURGERY      implants      EYE SURGERY  2013    Lasik     GALLBLADDER SURGERY      gall bladder removal      ORTHOPEDIC SURGERY  1996    Broken jaw & femur       FAMILY HISTORY:   Family History   Problem Relation Age of Onset     CANCER Mother      kidney     Other Cancer Mother      kidney     Aneurysm Father      Other Cancer Brother      lieukemia     CANCER Brother      Leukemia      Sleep Apnea Brother      Coronary Artery Disease No family hx of      DIABETES No family hx of        SOCIAL HISTORY:   Social History   Substance Use Topics     Smoking status: Current Some Day Smoker     Packs/day: 0.50     Years: 40.00     Start date: 3/1/1973     Last attempt to quit: 8/1/2015     Smokeless tobacco: Never Used     Alcohol use 0.0 oz/week     0 Standard drinks or equivalent per week      Comment: minimal     Lab Results   Component Value Date    WBC 12.0 (H) 04/17/2017    HGB 13.9 04/18/2017    HCT 49.6 04/17/2017     04/17/2017    CHOL 142 06/14/2016    TRIG 110 06/14/2016    HDL 36 (L) 06/14/2016    ALT 19 06/14/2016    AST 16 06/14/2016     04/18/2017    BUN 26 03/28/2017    CO2 30 03/28/2017    TSH 2.12 06/14/2016    PSA 0.56 06/14/2016    INR 0.97 04/18/2017                    Anesthesia Plan      History & Physical Review  History and physical reviewed and following examination; no interval change.    ASA Status:  4 .    NPO Status:  > 8 hours    Plan for General with Intravenous induction.  Maintenance will be Balanced.      Additional equipment: Arterial Line, Central Line and CVP      Postoperative Care      Consents                          .

## 2017-04-19 LAB
INTERPRETATION ECG - MUSE: NORMAL
KCT BLD-ACNC: 154 SEC (ref 105–167)
KCT BLD-ACNC: 189 SEC (ref 105–167)
KCT BLD-ACNC: 192 SEC (ref 105–167)
KCT BLD-ACNC: 200 SEC (ref 105–167)
KCT BLD-ACNC: 204 SEC (ref 105–167)
KCT BLD-ACNC: 208 SEC (ref 105–167)
KCT BLD-ACNC: 208 SEC (ref 105–167)
KCT BLD-ACNC: 211 SEC (ref 105–167)
KCT BLD-ACNC: 219 SEC (ref 105–167)
KCT BLD-ACNC: 219 SEC (ref 105–167)
KCT BLD-ACNC: 227 SEC (ref 105–167)

## 2017-04-19 PROCEDURE — 12000006 ZZH R&B IMCU INTERMEDIATE UMMC

## 2017-04-19 PROCEDURE — 25000132 ZZH RX MED GY IP 250 OP 250 PS 637: Performed by: SURGERY

## 2017-04-19 PROCEDURE — 25000128 H RX IP 250 OP 636: Performed by: SURGERY

## 2017-04-19 PROCEDURE — 25000132 ZZH RX MED GY IP 250 OP 250 PS 637: Performed by: CLINICAL NURSE SPECIALIST

## 2017-04-19 PROCEDURE — 25800025 ZZH RX 258: Performed by: SURGERY

## 2017-04-19 RX ORDER — NICOTINE 21 MG/24HR
1 PATCH, TRANSDERMAL 24 HOURS TRANSDERMAL EVERY 24 HOURS
Qty: 30 PATCH | Refills: 1 | Status: SHIPPED | OUTPATIENT
Start: 2017-04-19 | End: 2017-07-13

## 2017-04-19 RX ORDER — ACETAMINOPHEN 325 MG/1
975 TABLET ORAL EVERY 8 HOURS
Qty: 100 TABLET | Refills: 0 | COMMUNITY
Start: 2017-04-19 | End: 2017-06-09

## 2017-04-19 RX ORDER — CLOPIDOGREL BISULFATE 75 MG/1
75 TABLET ORAL DAILY
Qty: 30 TABLET | Refills: 3 | Status: SHIPPED | OUTPATIENT
Start: 2017-04-19 | End: 2017-06-09

## 2017-04-19 RX ORDER — TRAMADOL HYDROCHLORIDE 50 MG/1
50-100 TABLET ORAL EVERY 6 HOURS PRN
Qty: 60 TABLET | Refills: 0 | Status: SHIPPED | OUTPATIENT
Start: 2017-04-19 | End: 2017-04-19

## 2017-04-19 RX ORDER — ASPIRIN 81 MG/1
81 TABLET ORAL DAILY
Status: DISCONTINUED | OUTPATIENT
Start: 2017-04-19 | End: 2017-04-20 | Stop reason: HOSPADM

## 2017-04-19 RX ORDER — TRAMADOL HYDROCHLORIDE 50 MG/1
50-100 TABLET ORAL EVERY 6 HOURS PRN
Qty: 45 TABLET | Refills: 0 | Status: SHIPPED | OUTPATIENT
Start: 2017-04-19 | End: 2017-04-19

## 2017-04-19 RX ORDER — CILOSTAZOL 50 MG/1
50 TABLET ORAL 2 TIMES DAILY
Qty: 60 TABLET | Refills: 3 | Status: SHIPPED | OUTPATIENT
Start: 2017-04-19 | End: 2017-06-09

## 2017-04-19 RX ORDER — TRAMADOL HYDROCHLORIDE 50 MG/1
50-100 TABLET ORAL EVERY 6 HOURS PRN
Qty: 30 TABLET | Refills: 0 | Status: SHIPPED | OUTPATIENT
Start: 2017-04-19 | End: 2017-04-19

## 2017-04-19 RX ORDER — ACETAMINOPHEN 325 MG/1
650 TABLET ORAL EVERY 4 HOURS PRN
Qty: 100 TABLET | Refills: 0 | COMMUNITY
Start: 2017-04-21 | End: 2017-11-03

## 2017-04-19 RX ORDER — TRAMADOL HYDROCHLORIDE 50 MG/1
50-100 TABLET ORAL EVERY 6 HOURS PRN
Qty: 50 TABLET | Refills: 0 | Status: SHIPPED | OUTPATIENT
Start: 2017-04-19 | End: 2017-07-10

## 2017-04-19 RX ADMIN — HEPARIN SODIUM 5000 UNITS: 5000 INJECTION, SOLUTION INTRAVENOUS; SUBCUTANEOUS at 23:51

## 2017-04-19 RX ADMIN — CLOPIDOGREL 75 MG: 75 TABLET, FILM COATED ORAL at 08:10

## 2017-04-19 RX ADMIN — SODIUM CHLORIDE, POTASSIUM CHLORIDE, SODIUM LACTATE AND CALCIUM CHLORIDE: 600; 310; 30; 20 INJECTION, SOLUTION INTRAVENOUS at 08:10

## 2017-04-19 RX ADMIN — HEPARIN SODIUM 5000 UNITS: 5000 INJECTION, SOLUTION INTRAVENOUS; SUBCUTANEOUS at 15:53

## 2017-04-19 RX ADMIN — ROSUVASTATIN CALCIUM 10 MG: 10 TABLET, FILM COATED ORAL at 20:50

## 2017-04-19 RX ADMIN — CILOSTAZOL 50 MG: 50 TABLET ORAL at 20:49

## 2017-04-19 RX ADMIN — TRAMADOL HYDROCHLORIDE 100 MG: 50 TABLET, COATED ORAL at 14:43

## 2017-04-19 RX ADMIN — CILOSTAZOL 50 MG: 50 TABLET ORAL at 08:09

## 2017-04-19 RX ADMIN — ASPIRIN 81 MG: 81 TABLET, COATED ORAL at 13:50

## 2017-04-19 RX ADMIN — CEFAZOLIN SODIUM 2 G: 2 INJECTION, SOLUTION INTRAVENOUS at 04:08

## 2017-04-19 RX ADMIN — HEPARIN SODIUM 5000 UNITS: 5000 INJECTION, SOLUTION INTRAVENOUS; SUBCUTANEOUS at 08:10

## 2017-04-19 RX ADMIN — LISINOPRIL 40 MG: 40 TABLET ORAL at 20:49

## 2017-04-19 RX ADMIN — ACETAMINOPHEN 975 MG: 325 TABLET, FILM COATED ORAL at 02:03

## 2017-04-19 RX ADMIN — TRAMADOL HYDROCHLORIDE 100 MG: 50 TABLET, COATED ORAL at 02:03

## 2017-04-19 RX ADMIN — NICOTINE 1 PATCH: 21 PATCH TRANSDERMAL at 21:54

## 2017-04-19 RX ADMIN — TRAMADOL HYDROCHLORIDE 100 MG: 50 TABLET, COATED ORAL at 20:49

## 2017-04-19 RX ADMIN — TRAMADOL HYDROCHLORIDE 100 MG: 50 TABLET, COATED ORAL at 08:35

## 2017-04-19 RX ADMIN — AMLODIPINE BESYLATE 10 MG: 10 TABLET ORAL at 20:49

## 2017-04-19 RX ADMIN — METOPROLOL SUCCINATE 25 MG: 25 TABLET, EXTENDED RELEASE ORAL at 20:49

## 2017-04-19 ASSESSMENT — ACTIVITIES OF DAILY LIVING (ADL)
DRESS: 0-->INDEPENDENT
TRANSFERRING: 0-->INDEPENDENT
FALL_HISTORY_WITHIN_LAST_SIX_MONTHS: NO
AMBULATION: 0-->INDEPENDENT
COGNITION: 0 - NO COGNITION ISSUES REPORTED
TOILETING: 0-->INDEPENDENT
RETIRED_EATING: 0-->INDEPENDENT
SWALLOWING: 0-->SWALLOWS FOODS/LIQUIDS WITHOUT DIFFICULTY
BATHING: 0-->INDEPENDENT
RETIRED_COMMUNICATION: 0-->UNDERSTANDS/COMMUNICATES WITHOUT DIFFICULTY

## 2017-04-19 ASSESSMENT — PAIN DESCRIPTION - DESCRIPTORS
DESCRIPTORS: ACHING
DESCRIPTORS: ACHING
DESCRIPTORS: DULL
DESCRIPTORS: ACHING

## 2017-04-19 NOTE — PHARMACY-ADMISSION MEDICATION HISTORY
Admission medication history interview status for the 4/18/2017 admission is complete. See Epic admission navigator for allergy information, pharmacy, prior to admission medications and immunization status.     Medication history interview sources:  Pt interview, rosa fills, ankita fills.    Changes made to PTA medication list (reason)  Added: none  Deleted:   -naproxen (last fill in jan and pt does not use)  Changed:   -Rosuvastatin directions added    Additional medication history information (including reliability of information, actions taken by pharmacist):  1) poor hx- did not know doses. Says he takes gabapentin (100mg TID #60) and tramadol (1-2 q6h prn #20). Both last filled in feb 2017.   2) medications listed without last dose have been added for patients discharge from this admission      Prior to Admission medications    Medication Sig Last Dose Taking? Auth Provider   acetaminophen (TYLENOL) 325 MG tablet Take 2 tablets (650 mg) by mouth every 4 hours as needed for other (surgical pain)  Yes Citlaly Santo APRN CNS   acetaminophen (TYLENOL) 325 MG tablet Take 3 tablets (975 mg) by mouth every 8 hours  Yes Citlaly Santo APRN CNS   cilostazol (PLETAL) 50 MG tablet Take 1 tablet (50 mg) by mouth 2 times daily  Yes Citlaly Santo APRN CNS   clopidogrel (PLAVIX) 75 MG tablet Take 1 tablet (75 mg) by mouth daily  Yes Citlaly Santo APRN CNS   nicotine (NICODERM CQ) 21 MG/24HR 24 hr patch Place 1 patch onto the skin every 24 hours  Yes Citlaly Santo APRN CNS   traMADol (ULTRAM) 50 MG tablet Take 1-2 tablets ( mg) by mouth every 6 hours as needed for pain  Yes Citlaly Santo APRN CNS   rosuvastatin (CRESTOR) 10 MG tablet Take 1 tablet by mouth at bedtime 4/17/2017 at 1900 Yes Reported, Patient   benzonatate (TESSALON) 200 MG capsule Take 1 capsule (200 mg) by mouth 3 times daily as needed for cough Past Month at Unknown time Yes Roly Tomas,  ELVIRA   albuterol (PROAIR HFA/PROVENTIL HFA/VENTOLIN HFA) 108 (90 BASE) MCG/ACT Inhaler Inhale 2 puffs into the lungs every 6 hours as needed for shortness of breath / dyspnea or wheezing Past Week at Unknown time Yes Roly Tomas PA-C   aspirin EC 81 MG EC tablet Take 1 tablet (81 mg) by mouth daily  Patient taking differently: Take 81 mg by mouth every evening  4/16/2017 Yes Mike Burk MD   metoprolol (TOPROL-XL) 25 MG 24 hr tablet Take 1 tablet (25 mg) by mouth daily  Patient taking differently: Take 25 mg by mouth every evening  4/17/2017 at 1900 Yes Mike Burk MD   amLODIPine-benazepril (LOTREL) 10-40 MG per capsule Take 1 capsule by mouth daily  Patient taking differently: Take 1 capsule by mouth every evening  4/17/2017 at 1900 Yes Roly Tomas PA-C   naproxen (NAPROSYN) 500 MG tablet Take 1 tablet (500 mg) by mouth 2 times daily (with meals) Past Month at Unknown time Yes Nico Alva MD   hydrocortisone 2.5 % cream APPLY TO FACE TOPICALLY BID More than a month at Unknown time  Reported, Patient     Medication history completed by: Esra Nusseibeh, FlorinD Candidate      I attest that the information provided in this note by the pharmacy student is complete and accurate    George Mccain, PharmD  PGY-1 Resident Pharmacist

## 2017-04-19 NOTE — PROGRESS NOTES
Care Coordinator Progress Note     Admission Date/Time:  4/18/2017  Attending MD:  Nelly Granados MD     Data  Chart reviewed, discussed with interdisciplinary team.   Patient was admitted for:    Aorto-iliac atherosclerosis (H)  Hip pain, left  Tobacco abuse.    Concerns with insurance coverage for discharge needs: None identified  Current Living Situation: Patient lives with his Wife  Support System: Wife is Primary Caregiver  Services Involved: none   Transportation: Wife   Barriers to Discharge: None identified          Assessment  Pt s/p vascular surgery yesterday, anticipated discharge is tomorrow. I have met with Pt to introduce myself and care coordinator role. Prior to admission, Pt was independent living with his Wife and is still employed.  Pt has a Provena wound vac in place over the incision which he will discharge to home with and will be removed once vac is no longer working. Pt is up ambulating independently, no discharge needs anticipated.  I have met with Pt regarding todays discharge, Pts Wife will transport and assist as needed.  Pt currently has no outstanding questions or concerns.     Plan  Anticipated Discharge Date:  4/20/17  Anticipated Discharge Plan:  D/C to home with Outpt follow up    Lien Peck RN   6B care coordinator #[phone]

## 2017-04-19 NOTE — PLAN OF CARE
Problem: Goal Outcome Summary  Goal: Goal Outcome Summary  Outcome: Improving  Capnography IPI low at 0400, overnight surgery cross-cover notified. No respiratory distress, alert and arousable to voice.  No orders.  Pain in left buttocks controlled with medications and Aqua-K pad.  Asked to have calderón catheter removed at midnight, able to void at 0600.  Denies nausea.  Pedal pulses weak, sometimes doppler, extremities warm.  Hourly neuro checks unremarkable.  Dressings and wound vac CDI.  Temp: 97.4  F (36.3  C) Temp src: Oral BP: 99/68 Pulse: 66 Heart Rate: 667 Resp: 17 SpO2: 95 % O2 Device: Nasal cannula Oxygen Delivery: 4 LPM

## 2017-04-19 NOTE — PLAN OF CARE
"Problem: Goal Outcome Summary  Goal: Goal Outcome Summary  Outcome: No Change  /68  Pulse 66  Temp 98.8  F (37.1  C) (Axillary)  Resp 16  Ht 1.778 m (5' 10\")  Wt 109 kg (240 lb 4.8 oz)  SpO2 95%  BMI 34.48 kg/m2     VS: VSS, afebrile, EtCO2 low 40's, capnography on for 24 hrs  Neuro: A&Ox4.   Cardiac: SR.    Respiratory: Sating 95% on 5L NC  GI: no BM on shift  : Adequate urine output - calderón. Has had bladder spasms, advised pt to let staff know if these continue or get worse, and we can page team to see if it is appropriate to pull cath. Pt did not want cath out at this time.  IV Access: R CVC triple lumen, and R PIV. LR @75.  Drains/tubes: L groin prevena wound vac (to stay on for 7 days), no drainage, CDI  Diet/appetite: Regular diet, Emesis x2, yellow. PRN zofran and compazine given  Activity:  Ax1, got up to chair once  Pain: Lower back pain. PRN tramadol and prn IV dilaudid given  Skin: L arm brachial site CDI, L and R groin sites CDI. All other skin intact, no other deficits noted.      R: Continue with POC. Notify primary team with changes.          "

## 2017-04-19 NOTE — PROGRESS NOTES
VASCULAR SURGERY PROGRESS NOTE    HPI:    Mr. Rajan is a 61- year old male with history of HTN, Smoker, 1/2-1 ppd for 40 years and left leg severe lifestyle limiting claudication who was scheduled for endovascular aorto-bifemoral repair for treatment of a small abdominal aortic aneurysm and bilateral iliac occlusive disease 3/28/2017. Several concerns were noted pre-operatively, including antibodies in blood and leukocytosis. Mr. Rajan's chest xray and leukocytosis was attributed to an upper respiratory infection and he has taken 2- courses of Levaquin. His WBC on 4/13/2017 10.0.  On 4/18/2017 he underwent elective   Endovascular Abdomnal Aortic Aneurysm Repair with Endologix AFX2 Bifurcated Endograft system and MORALES Proximal Endograft System. Left common and external iliac artery stents using GiPStech Vascular Self-expanding stent system. Express LD iliac/biliary premounted stent system to the left and right common iliac arteries. Left femoral artery endarterectomy with LeMaitre Vascular biologiic patch with Dr. Granados.      SUBJECTIVE:  Patient laying in bed, complains of left buttock pain.  Reports episode of emesis last night.  Voiding without difficultly. Denies any SOB or dizziness.     OBJECTIVE:  Vital signs:  Temp: 97.5  F (36.4  C) Temp src: Oral BP: 104/63   Heart Rate: 69 Resp: 18 SpO2: 97 % O2 Device: Nasal cannula Oxygen Delivery: 4 LPM      Intake/Output Summary (Last 24 hours) at 04/19/17 1008  Last data filed at 04/19/17 1001   Gross per 24 hour   Intake           4317.5 ml   Output             1205 ml   Net           3112.5 ml       Vitals:    04/18/17 0727   Weight: 109 kg (240 lb 4.8 oz)       PHYSICAL EXAM:  NEURO/PSYCH: The patient is alert and oriented.  Appropriate.  Moves all extremities.    SKIN: Color appropriate for race, warm, dry.  PULMONARY: non-labored breathing  EXTREMITIES: left femoral prevena vac dressing intact, right groin puncture site soft, without obvious  hematoma.  Feet warm and well perfused, palpable bilateral PT and DP pulses.  Left arm access site soft, without obvious hematoma, palpable left radial pulses.    BMP  Recent Labs  Lab 04/18/17  1602 04/18/17  0907 04/17/17  1423    141  --    POTASSIUM 4.2 4.1 4.4   CHLORIDE 108  --   --    CO2 26  --   --    BUN 15  --   --    CR 0.64*  --  0.70   * 108*  --      CBC  Recent Labs  Lab 04/18/17  1914 04/18/17  1602 04/18/17  0907 04/17/17  1423 04/14/17  1136   WBC  --   --   --  12.0* 10.0   HGB  --  12.9* 13.9 15.5 15.2    141*  --  194 185     INR/PTT  Recent Labs  Lab 04/18/17  0646   INR 0.97     ABG  Recent Labs  Lab 04/18/17  0907   PH 7.35   PCO2 49*   PO2 157*   HCO3 27     LFT  Recent Labs  Lab 04/18/17  1602   AST 21   ALT 19   ALKPHOS 57   BILITOTAL 0.8   ALBUMIN 3.1*         ASSESSMENT:  61 year old male with Abdominal Aortic Aneurysm and lifestyle limiting claudication from aortoiliac occlusive disease who is status post endo AAA repair with bilateral iliac artery stenting and left femoral artery endarterectomy with patch.    PLAN:  - increase activity, ambulate in halls, saline lock IV  - follow up clinic appointment with vascular APN in one week for prevena removal and follow up with Dr. Granados in 2 weeks, follow up CT scan in one month  - continue pletal, Plavix, and Crestor   - resume home ASA   - smoking cessation strongly encouraged  - possible discharge to home later today if tolerates ambulation and diet, pain controlled and able to maintain O2 sat above 90 without oxygen  - encourage IS 10 times per hour while awake     Please do not hesitate to contact Dr. Granados's vascular surgery team with any questions.      Citlaly LEBLANC, CNS  Division of Vascular Surgery  [facility]  Pager [phone]

## 2017-04-19 NOTE — PLAN OF CARE
"Problem: Goal Outcome Summary  Goal: Goal Outcome Summary  Outcome: Improving  /69 (BP Location: Left arm)  Pulse 66  Temp 98.3  F (36.8  C) (Oral)  Resp 18  Ht 1.778 m (5' 10\")  Wt 109 kg (240 lb 4.8 oz)  SpO2 90%  BMI 34.48 kg/m2     VSS, A/Ox4, 1L NC. q4h neuroches unremarkable. Pt c/o left buttock pain, managed with tramadol. Denied nausea. Prevena wound vac intact with no output. Up with assist of 1, walked around the unit. Tolerating regular diet. Void x1 and no BM. Plan to dc tomorrow 4/20/17. Will continue to monitor.       "

## 2017-04-20 ENCOUNTER — CARE COORDINATION (OUTPATIENT)
Dept: CARDIOLOGY | Facility: CLINIC | Age: 62
End: 2017-04-20

## 2017-04-20 VITALS
BODY MASS INDEX: 34.4 KG/M2 | DIASTOLIC BLOOD PRESSURE: 68 MMHG | OXYGEN SATURATION: 94 % | HEART RATE: 77 BPM | RESPIRATION RATE: 18 BRPM | WEIGHT: 240.3 LBS | HEIGHT: 70 IN | TEMPERATURE: 98.1 F | SYSTOLIC BLOOD PRESSURE: 109 MMHG

## 2017-04-20 LAB
ANION GAP SERPL CALCULATED.3IONS-SCNC: 6 MMOL/L (ref 3–14)
BUN SERPL-MCNC: 15 MG/DL (ref 7–30)
CALCIUM SERPL-MCNC: 8.4 MG/DL (ref 8.5–10.1)
CHLORIDE SERPL-SCNC: 101 MMOL/L (ref 94–109)
CO2 SERPL-SCNC: 30 MMOL/L (ref 20–32)
COPATH REPORT: NORMAL
CREAT SERPL-MCNC: 0.61 MG/DL (ref 0.66–1.25)
ERYTHROCYTE [DISTWIDTH] IN BLOOD BY AUTOMATED COUNT: 14.1 % (ref 10–15)
GFR SERPL CREATININE-BSD FRML MDRD: ABNORMAL ML/MIN/1.7M2
GLUCOSE SERPL-MCNC: 147 MG/DL (ref 70–99)
HCT VFR BLD AUTO: 40.2 % (ref 40–53)
HGB BLD-MCNC: 12.6 G/DL (ref 13.3–17.7)
INR PPP: 1.12 (ref 0.86–1.14)
MAGNESIUM SERPL-MCNC: 2.1 MG/DL (ref 1.6–2.3)
MCH RBC QN AUTO: 30.7 PG (ref 26.5–33)
MCHC RBC AUTO-ENTMCNC: 31.3 G/DL (ref 31.5–36.5)
MCV RBC AUTO: 98 FL (ref 78–100)
PHOSPHATE SERPL-MCNC: 2.4 MG/DL (ref 2.5–4.5)
PLATELET # BLD AUTO: 146 10E9/L (ref 150–450)
POTASSIUM SERPL-SCNC: 3.7 MMOL/L (ref 3.4–5.3)
RBC # BLD AUTO: 4.11 10E12/L (ref 4.4–5.9)
SODIUM SERPL-SCNC: 137 MMOL/L (ref 133–144)
WBC # BLD AUTO: 13.3 10E9/L (ref 4–11)

## 2017-04-20 PROCEDURE — 83735 ASSAY OF MAGNESIUM: CPT | Performed by: SURGERY

## 2017-04-20 PROCEDURE — 25000132 ZZH RX MED GY IP 250 OP 250 PS 637: Performed by: CLINICAL NURSE SPECIALIST

## 2017-04-20 PROCEDURE — 85610 PROTHROMBIN TIME: CPT | Performed by: SURGERY

## 2017-04-20 PROCEDURE — 80048 BASIC METABOLIC PNL TOTAL CA: CPT | Performed by: SURGERY

## 2017-04-20 PROCEDURE — 36592 COLLECT BLOOD FROM PICC: CPT | Performed by: SURGERY

## 2017-04-20 PROCEDURE — 25000132 ZZH RX MED GY IP 250 OP 250 PS 637: Performed by: SURGERY

## 2017-04-20 PROCEDURE — 25000128 H RX IP 250 OP 636: Performed by: SURGERY

## 2017-04-20 PROCEDURE — 84100 ASSAY OF PHOSPHORUS: CPT | Performed by: SURGERY

## 2017-04-20 PROCEDURE — 85027 COMPLETE CBC AUTOMATED: CPT | Performed by: SURGERY

## 2017-04-20 RX ORDER — SENNOSIDES 8.6 MG
8.6 TABLET ORAL 2 TIMES DAILY PRN
Status: DISCONTINUED | OUTPATIENT
Start: 2017-04-20 | End: 2017-04-20 | Stop reason: HOSPADM

## 2017-04-20 RX ORDER — SENNOSIDES 8.6 MG
1-2 TABLET ORAL 2 TIMES DAILY PRN
Qty: 120 EACH | Refills: 0 | COMMUNITY
Start: 2017-04-20 | End: 2017-07-13

## 2017-04-20 RX ADMIN — HEPARIN SODIUM 5000 UNITS: 5000 INJECTION, SOLUTION INTRAVENOUS; SUBCUTANEOUS at 08:49

## 2017-04-20 RX ADMIN — ASPIRIN 81 MG: 81 TABLET, COATED ORAL at 08:49

## 2017-04-20 RX ADMIN — ACETAMINOPHEN 975 MG: 325 TABLET, FILM COATED ORAL at 11:11

## 2017-04-20 RX ADMIN — CLOPIDOGREL 75 MG: 75 TABLET, FILM COATED ORAL at 08:49

## 2017-04-20 RX ADMIN — TRAMADOL HYDROCHLORIDE 100 MG: 50 TABLET, COATED ORAL at 03:17

## 2017-04-20 RX ADMIN — CILOSTAZOL 50 MG: 50 TABLET ORAL at 08:49

## 2017-04-20 RX ADMIN — ACETAMINOPHEN 975 MG: 325 TABLET, FILM COATED ORAL at 03:41

## 2017-04-20 RX ADMIN — SENNOSIDES 8.6 MG: 8.6 TABLET, FILM COATED ORAL at 11:11

## 2017-04-20 NOTE — PLAN OF CARE
Problem: Goal Outcome Summary  Goal: Goal Outcome Summary  Outcome: Improving  Slept 60-75% of the night. Pain to left posterior hip controlled with acetaminophen and tramadol.  SPO2 < 90% on room air at rest.  Wore 2 LPM nasal cannula most of the night.  Ambulated in halls last evening.  Anticipate discharge later today.

## 2017-04-20 NOTE — PROGRESS NOTES
"[facility]  \"Hello, my name is Brenna Ying , and I am calling from the [facility].  I want to check in and see how you are doing, after leaving the hospital.  You may also receive a call from your Care Coordinator (care team), but I want to make sure you don t have any urgent needs.  I have a couple questions to review with you:     Post-Discharge Outreach                                                    Santos Rajan is a 61 year old male     Follow-up Appointments           Follow Up and recommended labs and tests       Follow up with DEANA Loera in one week with ABIs and follow up appointment with Dr. Granados in 2 weeks. You will also have a follow up CT scan in one month which will be scheduled during your appointment with Dr. Granados.     With questions, concerns, or to request an appointment, please call either:     Ashley Pemberton, Care Coordinator RN, Vascular Surgery  [phone]     Vascular Call Center  [phone]     To contact someone after 5 pm, on a weekend, or on a Holiday, please call:  [facility]  [phone], option 4 to have a member of the Vascular Surgery Service paged.                       Your next 10 appointments already scheduled            Apr 25, 2017 10:00 AM CDT   US KIRILL DOPPLER NO EXERCISE with [address] [facility] ([facility])     [address] [place] [address] [phone]                 Please bring a list of your medicines (including vitamins, minerals and over-the-counter drugs). Also, tell your doctor about any allergies you may have. Wear comfortable clothes and leave your valuables at home. No caffeine or tobacco for 1 hour prior to exam. Please call the Imaging Department at your exam site with any questions.                  Apr 25, 2017 11:00 AM CDT   (Arrive by 10:45 AM)   New Vascular Visit with Citlaly Yoo " "DEANA Santo   [facility] ([facility] [place])     [address]  [address] [facility] [address] [phone]                  May 01, 2017 3:00 PM CDT   (Arrive by 2:45 PM)   Return Vascular Visit with Nelly Granados MD   [facility] ([facility])     [address]  [address] [facility] [address] [phone]                      Care Team:    Patient Care Team       Relationship Specialty Notifications Start End    Roly Tomas PA-C PCP - General Physician Assistant  8/12/16     Phone: [phone] Fax: [phone]         [facility] [address] [facility] [address]    Nico Alva MD Referring Physician Family Medicine - Sports Medicine  2/23/17     Comment:  referring to Vascular    Phone: [phone] Fax: [phone]         [place] [address] [facility] [address]    Nelly Granados MD MD Vascular Surgery  2/23/17     Phone: [phone] Fax: [phone]         [facility] [address]  [facility] [address]            Transition of Care Review                                                      Did you have a surgery or procedure during your hospital visit? Yes   If yes, do you have any of the following:     Signs of infection: NO    Pain:  Yes     Pain Scale (0-10) 7/10     Location: Lower back pain    Wound/incision concerns? NO    Do you have all of your medications/refills?  No- did not get Senna but has Miralax and will take that    Are you having any side effects or questions about your medication(s)? No    Do you have any new or worsening symptoms?  No    Do you have any future appointments scheduled?   Yes          --------------------------> Patient had a question about the machine he was sent home with, he said that this morning, for 3-4 minutes it would continually \"go off\" (beep) every 15 seconds but then he moved and " changed positions and it stopped, he just wants to make sure nothing is wrong             Plan                                                      Thanks for your time.  Your Care Coordinator may follow-up within the next couple days.  In the meantime if you have questions, concerns or problems call your care team.        Brenna Ying

## 2017-04-20 NOTE — PLAN OF CARE
Problem: Goal Outcome Summary  Goal: Goal Outcome Summary  Outcome: Adequate for Discharge Date Met:  04/20/17  DISCHARGE                         No discharge date for patient encounter.  ----------------------------------------------------------------------------  Discharged to: Home  Via: private transportation  Accompanied by: Family  Discharge Instructions: Regular diet, although heart healthy diet discussed, activity, medications, follow up appointments, when to call the MD, aftercare instructions.  Prescriptions: To be filled by discharge pharmacy; medication list reviewed & sent with pt  Follow Up Appointments: arranged; information given  Belongings: All sent with pt  Triple IJ: d/c'd per protocol without difficulties  Pt exhibits understanding of above discharge instructions; all questions answered.     Discharge Paperwork: Signed, copied, and sent home with patient.

## 2017-04-20 NOTE — PROGRESS NOTES
VASCULAR SURGERY PROGRESS NOTE    HPI:    Mr. Rajan is a 61- year old male with history of HTN, Smoker, 1/2-1 ppd for 40 years and left leg severe lifestyle limiting claudication who was scheduled for endovascular aorto-bifemoral repair for treatment of a small abdominal aortic aneurysm and bilateral iliac occlusive disease 3/28/2017. Several concerns were noted pre-operatively, including antibodies in blood and leukocytosis. Mr. Rajan's chest xray and leukocytosis was attributed to an upper respiratory infection and he has taken 2- courses of Levaquin. His WBC on 4/13/2017 10.0.  On 4/18/2017 he underwent elective   Endovascular Abdomnal Aortic Aneurysm Repair with Endologix AFX2 Bifurcated Endograft system and MORALES Proximal Endograft System. Left common and external iliac artery stents using [employer] Vascular Self-expanding stent system. Express LD iliac/biliary premounted stent system to the left and right common iliac arteries. Left femoral artery endarterectomy with LeMaitre Vascular biologiic patch with Dr. Granados.      SUBJECTIVE:  Patient laying in bed, reports left buttock pain improved this morning.  Has not had BM for couple days.  Voiding without difficultly. Denies any SOB or dizziness. Tolerating ambulation in halls.      OBJECTIVE:  Vital signs:  Temp: 98.1  F (36.7  C) Temp src: Axillary BP: 109/68 Pulse: 77 Heart Rate: 74 Resp: 18 SpO2: 94 % O2 Device: None (Room air) Oxygen Delivery: 2 LPM      Intake/Output Summary (Last 24 hours) at 04/19/17 1008  Last data filed at 04/19/17 1001   Gross per 24 hour   Intake           4317.5 ml   Output             1205 ml   Net           3112.5 ml       Vitals:    04/18/17 0727   Weight: 109 kg (240 lb 4.8 oz)       PHYSICAL EXAM:  NEURO/PSYCH: The patient is alert and oriented.  Appropriate.  Moves all extremities.    SKIN: Color appropriate for race, warm, dry.  PULMONARY: non-labored breathing, not requiring supplemental oxygen  EXTREMITIES:  left femoral prevena vac dressing intact, right groin puncture site soft, without obvious hematoma.  Feet warm and well perfused, palpable bilateral PT and DP pulses.  Left arm access site soft, without obvious hematoma, palpable left radial pulses.    BMP    Recent Labs  Lab 04/20/17  0753 04/18/17  1602 04/18/17  0907 04/17/17  1423    140 141  --    POTASSIUM 3.7 4.2 4.1 4.4   CHLORIDE 101 108  --   --    CO2 30 26  --   --    BUN 15 15  --   --    CR 0.61* 0.64*  --  0.70   * 126* 108*  --    MAG 2.1  --   --   --    PHOS 2.4*  --   --   --      CBC    Recent Labs  Lab 04/20/17  0753 04/18/17  1914 04/18/17  1602 04/18/17  0907 04/17/17  1423 04/14/17  1136   WBC 13.3*  --   --   --  12.0* 10.0   HGB 12.6*  --  12.9* 13.9 15.5 15.2   * 154 141*  --  194 185     INR/PTT    Recent Labs  Lab 04/20/17  0753 04/18/17  0646   INR 1.12 0.97     ABG    Recent Labs  Lab 04/18/17  0907   PH 7.35   PCO2 49*   PO2 157*   HCO3 27     LFT    Recent Labs  Lab 04/18/17  1602   AST 21   ALT 19   ALKPHOS 57   BILITOTAL 0.8   ALBUMIN 3.1*         ASSESSMENT:  61 year old male with Abdominal Aortic Aneurysm and lifestyle limiting claudication from aortoiliac occlusive disease who is status post endo AAA repair with bilateral iliac artery stenting and left femoral artery endarterectomy with patch.    PLAN:  - increase activity, ambulate in halls, saline lock IV  - follow up clinic appointment with vascular APN in one week for prevena removal and ABIs and follow up with Dr. Granados in 2 weeks, follow up CT scan in one month  - continue pletal, Plavix, and Crestor   - resume home ASA   - smoking cessation strongly encouraged  - encourage IS 10 times per hour while awake   - ordered senokot   - discharge to home today    Please do not hesitate to contact Dr. Granados's vascular surgery team with any questions.      Citlaly LEBLANC, CNS  Division of Vascular Surgery  [facility]  Pager  [phone]

## 2017-04-20 NOTE — DISCHARGE SUMMARY
VASCULAR SURGERY HOSPITAL DISCHARGE SUMMARY    ADMISSION DATE:  4/18/2017    ADMISSION DIAGNOSES: Abdominal Aortic Aneursym   AAA (abdominal aortic aneurysm) without rupture (H)  Aorto-iliac atherosclerosis (H)    DISCHARGE DATE: 4/20/2017    DISCHARGE DIAGNOSES:   Abdominal Aortic Aneurysm without rupture (H)  Aorto-iliac atherosclerosis (H)    Problem List Items Addressed This Visit        Circulatory    Aorto-iliac atherosclerosis (H) - Primary    Relevant Medications    acetaminophen (TYLENOL) 325 MG tablet (Start on 4/21/2017)    acetaminophen (TYLENOL) 325 MG tablet    cilostazol (PLETAL) 50 MG tablet    clopidogrel (PLAVIX) 75 MG tablet    sennosides (SENOKOT) 8.6 MG tablet    Other Relevant Orders    US KIRILL Doppler No Exercise      Other Visit Diagnoses     Hip pain, left        Relevant Medications    traMADol (ULTRAM) tablet  mg    acetaminophen (TYLENOL) tablet 975 mg    acetaminophen (TYLENOL) tablet 650 mg (Start on 4/21/2017 12:00 AM)    HYDROmorphone (PF) (DILAUDID) injection 0.3-0.5 mg    acetaminophen (TYLENOL) 325 MG tablet (Start on 4/21/2017)    acetaminophen (TYLENOL) 325 MG tablet    aspirin EC EC tablet 81 mg    traMADol (ULTRAM) 50 MG tablet    Tobacco abuse        Relevant Medications    nicotine (NICODERM CQ) 21 MG/24HR 24 hr patch          CONDITION AT DISCHARGE: Good  Discharge Disposition   Discharged to home    DISCHARGING PROVIDER: DEANA Loera, ES  DATE OF SERVICE: 04/20/17    VASCULAR SURGEON: Dr. Granados   Procedure(s):  Ultra sound guided left/right femoral arterial and left brachial artery access.  Endovascular Abdomnal Aortic  Aneurysm Repair with Endologix AFX2 Bifurcated Endograft system and MORALES   Proximal Endograft System.  Left common and external iliac artery stents using [employer] Epic Vascular Self-expanding stent system. Express LD iliac/biliary premounted stent system to the left and right common iliac arteries. Left femoral artery endarterectomy  with LeMaitre Vascular biologiic patch.    - Wound Class: III-Contaminated    Consultations This Hospital Stay   MEDICATION HISTORY IP PHARMACY CONSULT    HPI:  Mr. Rajan is a 61- year old male with history of HTN, Smoker, 1/2-1 ppd for 40 years and left leg severe lifestyle limiting claudication who was scheduled for endovascular aorto-bifemoral repair for treatment of a small abdominal aortic aneurysm and bilateral iliac occlusive disease 3/28/2017. Several concerns were noted pre-operatively, including antibodies in blood and leukocytosis. Mr. Rajan's chest xray and leukocytosis was attributed to an upper respiratory infection and he has taken 2- courses of Levaquin. His WBC on 4/13/2017 10.0. On 4/18/2017 he underwent elective   Endovascular Abdomnal Aortic Aneurysm Repair with Endologix AFX2 Bifurcated Endograft system and MORALES Proximal Endograft System. Left common and external iliac artery stents using [employer] Vascular Self-expanding stent system. Express LD iliac/biliary premounted stent system to the left and right common iliac arteries. Left femoral artery endarterectomy with LeMaitre Vascular biologiic patch with Dr. Granados.  Mr. Rajan's hospital course was uncomplicated.  He had return of normal bladder habits and was able to ambulate independently.  His pain was controlled with Tramadol and tolerated a regular diet. He was discharged to home in good condition on 4/20/2017.          PHYSICAL EXAM:  NEURO/PSYCH: The patient is alert and oriented. Appropriate. Moves all extremities.   SKIN: Color appropriate for race, warm, dry.  PULMONARY: non-labored breathing, not requiring supplemental oxygen  EXTREMITIES: left femoral prevena vac dressing intact, right groin puncture site soft, without obvious hematoma. Feet warm and well perfused, palpable bilateral PT and DP pulses. Left arm access site soft, without obvious hematoma, palpable left radial pulses.        PRIMARY CARE PROVIDER:  Primary  Care Physician   Roly Tomas      Code Status   Full Code      Discharge Orders     US KIRILL Doppler No Exercise     Tobacco Cessation [facility] Referral     Reason for your hospital stay   You underwent endo AAA repair with bilateral iliac artery stenting and left femoral artery endarterectomy with patch to treat your abdominal aortic aneurysm and lifestyle limiting claudication disease.     Follow Up and recommended labs and tests   Follow up with DEANA Loera in one week with ABIs and follow up appointment with Dr. Granados in 2 weeks.  You will also have a follow up CT scan in one month which will be scheduled during your appointment with Dr. Granados.    With questions, concerns, or to request an appointment, please call either:    Ashley Pemberton, Care Coordinator RN, Vascular Surgery  [phone]    Vascular Call Center  [phone]    To contact someone after 5 pm, on a weekend, or on a Holiday, please call:  [facility]  [phone], option 4 to have a member of the Vascular Surgery Service paged.     Activity   Your activity upon discharge: no heavy lifting, pulling pushing or pulling over 10 lbs until seen by Dr. Granados.  No strenuous activity.    Elevate your legs when laying down to help swelling.     When to contact your care team   Contact Dr. Granados's office if any increased pain, swelling, drainage or fever develop.     Wound care and dressings   Instructions to care for your wound at home: please keep left prevena dressing in place.  Prevena dressing will be removed during clinic appointment next week.   If prevena dressing starts to beep or loose suction may remove dressing and contact Dr. Granados's office.     Discharge Instructions   Follow up with your primary physician next week for a post-hospitalization clinic visit.     Full Code     Diet   Follow this diet upon discharge: Regular     CALL IT QUITS REFERRAL PROGRAM    We would like to congratulate you on  "your efforts to stop using tobacco!  We would like to help you stop smoking and can refer you to the \"Call It Quits\" Program.  Below is some information about the program.      Free phone support can help you quit tobacco.  Thousands have quit successfully with help from a phone support program. People who use the program are more likely to be successful quitting tobacco than those who try to quit on their own.    How does phone support work?  With this program, you ll talk with a tobacco quitline . He or she will help you develop a quit plan that fits your situation. Through a series of phone calls --  scheduled at your convenience -- your quitline  will help you deal with cravings and stresses as you quit.    Is the program really free?  Everyone in [place] -- whether or not they have health care coverage -- is eligible for free phone support to quit tobacco.    What about confidentiality?  This program is confidential. The same laws that protect your medical information also protect any personal information you give to the tobacco quitline.    How do I get started?  1. Complete the referral form from your provider. It's your permission for a quitline  to call. Your provider may use an electronic version of the form; if so, you ll give a verbal okay to be called.  2. Your provider faxes the form to a quitline .  3. Your quitline  will call within a few days to answer questions, talk with you about your situation and get you started.    What should I do if no one contacts me?  If you have not heard from the quitline  within three business days, please call your provider.    Supporting your success.  Studies show a phone support program helps people quit tobacco. Nicotine replacement therapies -- such as the patch or gum -- in combination with support from a  quitline  increases quitting success even more.    The Call it Quits Referral Program is administered by the [place] " Department of Health and supported by [place] Tobacco Quitlines, a collaboration among [place]'s major health plans ([employer] of [place], [employer], [employer], [employer] and [employer]) and [facility].    Call Ashley Vascular Surgery RN Care Coordinator at [phone] if you would like to enroll in the program.    Discharge Medications   Current Discharge Medication List      START taking these medications    Details   sennosides (SENOKOT) 8.6 MG tablet Take 1-2 tablets by mouth 2 times daily as needed for constipation  Qty: 120 each, Refills: 0    Associated Diagnoses: Aorto-iliac atherosclerosis (H)      !! acetaminophen (TYLENOL) 325 MG tablet Take 2 tablets (650 mg) by mouth every 4 hours as needed for other (surgical pain)  Qty: 100 tablet, Refills: 0    Associated Diagnoses: Aorto-iliac atherosclerosis (H)      !! acetaminophen (TYLENOL) 325 MG tablet Take 3 tablets (975 mg) by mouth every 8 hours  Qty: 100 tablet, Refills: 0    Associated Diagnoses: Aorto-iliac atherosclerosis (H)      cilostazol (PLETAL) 50 MG tablet Take 1 tablet (50 mg) by mouth 2 times daily  Qty: 60 tablet, Refills: 3    Associated Diagnoses: Aorto-iliac atherosclerosis (H)      clopidogrel (PLAVIX) 75 MG tablet Take 1 tablet (75 mg) by mouth daily  Qty: 30 tablet, Refills: 3    Associated Diagnoses: Aorto-iliac atherosclerosis (H)       !! - Potential duplicate medications found. Please discuss with provider.      CONTINUE these medications which have CHANGED    Details   nicotine (NICODERM CQ) 21 MG/24HR 24 hr patch Place 1 patch onto the skin every 24 hours  Qty: 30 patch, Refills: 1    Associated Diagnoses: Tobacco abuse      traMADol (ULTRAM) 50 MG tablet Take 1-2 tablets ( mg) by mouth every 6 hours as needed for pain  Qty: 50 tablet, Refills: 0    Associated Diagnoses: Hip pain, left         CONTINUE these medications which have NOT CHANGED    Details   rosuvastatin (CRESTOR) 10 MG tablet  Take 1 tablet by mouth at bedtime  Refills: 1      benzonatate (TESSALON) 200 MG capsule Take 1 capsule (200 mg) by mouth 3 times daily as needed for cough  Qty: 30 capsule, Refills: 0    Associated Diagnoses: Leukocytosis, unspecified type      albuterol (PROAIR HFA/PROVENTIL HFA/VENTOLIN HFA) 108 (90 BASE) MCG/ACT Inhaler Inhale 2 puffs into the lungs every 6 hours as needed for shortness of breath / dyspnea or wheezing  Qty: 1 Inhaler, Refills: 1    Associated Diagnoses: Acute bronchitis, unspecified organism      aspirin EC 81 MG EC tablet Take 1 tablet (81 mg) by mouth daily  Qty: 60 tablet, Refills: 3    Associated Diagnoses: Coronary atherosclerosis due to lipid rich plaque      metoprolol (TOPROL-XL) 25 MG 24 hr tablet Take 1 tablet (25 mg) by mouth daily  Qty: 30 tablet, Refills: 3    Associated Diagnoses: Coronary atherosclerosis due to lipid rich plaque      amLODIPine-benazepril (LOTREL) 10-40 MG per capsule Take 1 capsule by mouth daily  Qty: 90 capsule, Refills: 1    Associated Diagnoses: Benign essential hypertension      hydrocortisone 2.5 % cream APPLY TO FACE TOPICALLY BID  Refills: 3         STOP taking these medications       levofloxacin (LEVAQUIN) 500 MG tablet Comments:   Reason for Stopping:         naproxen (NAPROSYN) 500 MG tablet Comments:   Reason for Stopping:             Allergies   Allergies   Allergen Reactions     Blood Transfusion Related (Informational Only) Other (See Comments)     Patient has a history of a clinically significant antibody against RBC antigens.  A delay in compatible RBCs may occur.     Codeine      Patient reports mom had severe reaction to codeine       Time Spent on this Encounter   I, Citlaly Santo, personally saw the patient today and spent greater than 30 minutes discharging this patient.      Citlaly LEBLANC, CNS  Division of Vascular Surgery  [facility]  Pager [phone]

## 2017-04-21 NOTE — PROGRESS NOTES
"Spoke with Mr Rajan regarding post operative recovery.  He states he is doing \"ok\" .  He does complain of some lower leg swelling.  I advised him that was normal post operatively and he should  elevate his legs above the level of the groin throughout the day.  He has been walking at times without difficulty.  Pain is well managed.  "

## 2017-04-25 ENCOUNTER — OFFICE VISIT (OUTPATIENT)
Dept: VASCULAR SURGERY | Facility: CLINIC | Age: 62
End: 2017-04-25

## 2017-04-25 VITALS
OXYGEN SATURATION: 97 % | DIASTOLIC BLOOD PRESSURE: 72 MMHG | SYSTOLIC BLOOD PRESSURE: 122 MMHG | RESPIRATION RATE: 16 BRPM | HEART RATE: 71 BPM

## 2017-04-25 DIAGNOSIS — I74.09 AORTOILIAC OCCLUSIVE DISEASE (H): Primary | ICD-10-CM

## 2017-04-25 ASSESSMENT — PAIN SCALES - GENERAL: PAINLEVEL: NO PAIN (0)

## 2017-04-25 NOTE — PROGRESS NOTES
VASCULAR SURGERY CLINIC ESTABLISHED PATIENT NOTE    HPI:    Mr. Rajan is a 61- year old male with history of HTN, Smoker, 1/2-1 ppd for 40 years and left leg severe lifestyle limiting claudication who underwent elective   Endovascular Abdomnal Aortic Aneurysm Repair with Endologix AFX2 Bifurcated Endograft system and MORALES Proximal Endograft System. Left common and external iliac artery stents using [employer] Epic Vascular Self-expanding stent system. Express LD iliac/biliary premounted stent system to the left and right common iliac arteries. Left femoral artery endarterectomy with LeMaitre Vascular biologiic patch with Dr. Granados on 4/18/2017.  His hospital course was uncomplicated and he was discharged to home on 4/20/2017.  He returns today to clinic for incision check and ABIs.       SUBJECTIVE:  Patient reports his low back pain and left buttock pain is improved since discharge.  He cut back smoking to 5 cigarettes and still using nicotine patches. He removed his Prevena wound vac on Sunday, 4/23/2017 due to it losing suction.     OBJECTIVE:    Vital signs:    /72 (BP Location: Left arm)  Pulse 71  Resp 16  SpO2 97%        Prior to Admission medications    Medication Sig Start Date End Date Taking? Authorizing Provider   sennosides (SENOKOT) 8.6 MG tablet Take 1-2 tablets by mouth 2 times daily as needed for constipation 4/20/17   Citlaly Santo APRN CNS   acetaminophen (TYLENOL) 325 MG tablet Take 2 tablets (650 mg) by mouth every 4 hours as needed for other (surgical pain) 4/21/17   Citlaly Santo APRN CNS   acetaminophen (TYLENOL) 325 MG tablet Take 3 tablets (975 mg) by mouth every 8 hours 4/19/17   Citlaly Santo APRN CNS   cilostazol (PLETAL) 50 MG tablet Take 1 tablet (50 mg) by mouth 2 times daily 4/19/17   Citlaly Santo APRN CNS   clopidogrel (PLAVIX) 75 MG tablet Take 1 tablet (75 mg) by mouth daily 4/19/17   Citlaly Santo APRN CNS   nicotine  (NICODERM CQ) 21 MG/24HR 24 hr patch Place 1 patch onto the skin every 24 hours 4/19/17   Citlaly Santo APRN CNS   traMADol (ULTRAM) 50 MG tablet Take 1-2 tablets ( mg) by mouth every 6 hours as needed for pain 4/19/17   Citlaly Santo APRN CNS   hydrocortisone 2.5 % cream APPLY TO FACE TOPICALLY BID 4/9/17   Reported, Patient   rosuvastatin (CRESTOR) 10 MG tablet Take 1 tablet by mouth at bedtime 4/4/17   Reported, Patient   benzonatate (TESSALON) 200 MG capsule Take 1 capsule (200 mg) by mouth 3 times daily as needed for cough 4/11/17   Roly Tomas PA-C   albuterol (PROAIR HFA/PROVENTIL HFA/VENTOLIN HFA) 108 (90 BASE) MCG/ACT Inhaler Inhale 2 puffs into the lungs every 6 hours as needed for shortness of breath / dyspnea or wheezing 4/11/17   Roly Tomas PA-C   aspirin EC 81 MG EC tablet Take 1 tablet (81 mg) by mouth daily  Patient taking differently: Take 81 mg by mouth every evening  3/22/17   Mike Burk MD   metoprolol (TOPROL-XL) 25 MG 24 hr tablet Take 1 tablet (25 mg) by mouth daily  Patient taking differently: Take 25 mg by mouth every evening  3/22/17   Mike Burk MD   amLODIPine-benazepril (LOTREL) 10-40 MG per capsule Take 1 capsule by mouth daily  Patient taking differently: Take 1 capsule by mouth every evening  1/10/17   Roly Tomas PA-C       PHYSICAL EXAM:  NEURO/PSYCH: The patient is alert and oriented.  Appropriate.  Moves all extremities.   SKIN: Color appropriate for race, warm, dry.  PULMONARY: non-labored breathing     EXTREMITIES: left groin incision C/D/I, no signs of infection, palpable bilateral PT and DP pulses, right groin puncture site soft, without obvious hematoma, left arm access site soft    KIRILL RESULTS:  Right KIRILL 1.15, previously 0.81 on 2/21/17  Left KIRILL 1.17, previously 0.56 on 2/21/17      ASSESSMENT:  61 year old male with Abdominal Aortic Aneurysm and lifestyle limiting claudication from aortoiliac occlusive  disease who is status post endo AAA repair with bilateral iliac artery stenting and left femoral artery endarterectomy with patch.    Patient Active Problem List   Diagnosis     CARDIOVASCULAR SCREENING; LDL GOAL LESS THAN 160     Impaired fasting glucose     Low testosterone     Fatty liver     Umbilical hernia     Colon polyps     Advanced directives, counseling/discussion     Benign essential hypertension     Diverticulosis of large intestine without hemorrhage     Status post coronary angiogram     AAA (abdominal aortic aneurysm) without rupture (H)     Aorto-iliac atherosclerosis (H)           PLAN:  -follow up with Dr. Granados set up for next week and CT scan in 2-3 weeks  - continue pletal, Plavix, ASA and Crestor   - okay to shower  - smoking cessation strongly encouraged         Citlaly LEBLANC, CNS  Division of Vascular Surgery  [facility]  Pager [phone]

## 2017-04-25 NOTE — MR AVS SNAPSHOT
After Visit Summary   4/25/2017    Santos Rajan    MRN: [id number]           Patient Information     Date Of Birth          1955        Visit Information        Provider Department      4/25/2017 11:00 AM Citlaly Santo APRN CNS [facility]         Follow-ups after your visit        Your next 10 appointments already scheduled     May 01, 2017  3:00 PM CDT   (Arrive by 2:45 PM)   Return Vascular Visit with MD BRENT Singleton [facility] ([facility])    [address] [place] [id number] [phone]              Who to contact     Please call your clinic at [phone] to:    Ask questions about your health    Make or cancel appointments    Discuss your medicines    Learn about your test results    Speak to your doctor   If you have compliments or concerns about an experience at your clinic, or if you wish to file a complaint, please contact [facility] Patient Relations at [phone] or email us at [email or web address]         Additional Information About Your Visit        MyChart Information     [employer] gives you secure access to your electronic health record. If you see a primary care provider, you can also send messages to your care team and make appointments. If you have questions, please call your primary care clinic.  If you do not have a primary care provider, please call [phone] and they will assist you.      [employer] is an electronic gateway that provides easy, online access to your medical records. With [employer], you can request a clinic appointment, read your test results, renew a prescription or communicate with your care team.     To access your existing account, please contact your [facility] or call [phone] for assistance.        Care EveryWhere ID     This is your Care EveryWhere ID. This could be used by other  organizations to access your [place] medical records  [id number]        Your Vitals Were     Pulse Respirations Pulse Oximetry             71 16 97%          Blood Pressure from Last 3 Encounters:   04/25/17 122/72   04/20/17 109/68   04/17/17 121/78    Weight from Last 3 Encounters:   04/18/17 240 lb 4.8 oz   04/11/17 241 lb 6.4 oz   03/28/17 236 lb              Today, you had the following     No orders found for display         Today's Medication Changes          These changes are accurate as of: 4/25/17 11:30 AM.  If you have any questions, ask your nurse or doctor.               These medicines have changed or have updated prescriptions.        Dose/Directions    amLODIPine-benazepril 10-40 MG per capsule   Commonly known as:  LOTREL   This may have changed:  when to take this   Used for:  Benign essential hypertension        Dose:  1 capsule   Take 1 capsule by mouth daily   Quantity:  90 capsule   Refills:  1       aspirin 81 MG EC tablet   This may have changed:  when to take this   Used for:  Coronary atherosclerosis due to lipid rich plaque        Dose:  81 mg   Take 1 tablet (81 mg) by mouth daily   Quantity:  60 tablet   Refills:  3       metoprolol 25 MG 24 hr tablet   Commonly known as:  TOPROL-XL   This may have changed:  when to take this   Used for:  Coronary atherosclerosis due to lipid rich plaque        Dose:  25 mg   Take 1 tablet (25 mg) by mouth daily   Quantity:  30 tablet   Refills:  3                Primary Care Provider Office Phone # Fax #    Roly Tomas PA-C [phone]       [facility] [place] [address]  [place] [address]        Thank you!     Thank you for choosing [facility]  for your care. Our goal is always to provide you with excellent care. Hearing back from our patients is one way we can continue to improve our services. Please take a few minutes to complete the written survey that you may receive in the mail after your visit with us.  Thank you!             Your Updated Medication List - Protect others around you: Learn how to safely use, store and throw away your medicines at www.disposemymeds.org.          This list is accurate as of: 4/25/17 11:30 AM.  Always use your most recent med list.                   Brand Name Dispense Instructions for use    * acetaminophen 325 MG tablet    TYLENOL    100 tablet    Take 3 tablets (975 mg) by mouth every 8 hours       * acetaminophen 325 MG tablet    TYLENOL    100 tablet    Take 2 tablets (650 mg) by mouth every 4 hours as needed for other (surgical pain)       albuterol 108 (90 BASE) MCG/ACT Inhaler    PROAIR HFA/PROVENTIL HFA/VENTOLIN HFA    1 Inhaler    Inhale 2 puffs into the lungs every 6 hours as needed for shortness of breath / dyspnea or wheezing       amLODIPine-benazepril 10-40 MG per capsule    LOTREL    90 capsule    Take 1 capsule by mouth daily       aspirin 81 MG EC tablet     60 tablet    Take 1 tablet (81 mg) by mouth daily       benzonatate 200 MG capsule    TESSALON    30 capsule    Take 1 capsule (200 mg) by mouth 3 times daily as needed for cough       cilostazol 50 MG tablet    PLETAL    60 tablet    Take 1 tablet (50 mg) by mouth 2 times daily       clopidogrel 75 MG tablet    PLAVIX    30 tablet    Take 1 tablet (75 mg) by mouth daily       hydrocortisone 2.5 % cream      APPLY TO FACE TOPICALLY BID       metoprolol 25 MG 24 hr tablet    TOPROL-XL    30 tablet    Take 1 tablet (25 mg) by mouth daily       nicotine 21 MG/24HR 24 hr patch    NICODERM CQ    30 patch    Place 1 patch onto the skin every 24 hours       rosuvastatin 10 MG tablet    CRESTOR     Take 1 tablet by mouth at bedtime       sennosides 8.6 MG tablet    SENOKOT    120 each    Take 1-2 tablets by mouth 2 times daily as needed for constipation       traMADol 50 MG tablet    ULTRAM    50 tablet    Take 1-2 tablets ( mg) by mouth every 6 hours as needed for pain       * Notice:  This list has 2 medication(s)  that are the same as other medications prescribed for you. Read the directions carefully, and ask your doctor or other care provider to review them with you.

## 2017-04-25 NOTE — NURSING NOTE
Chief Complaint   Patient presents with     RECHECK     1 week follow up        Vitals:    04/25/17 1110   BP: 122/72   BP Location: Left arm   Pulse: 71   Resp: 16   SpO2: 97%       There is no height or weight on file to calculate BMI.               Mehreen Montano LPN

## 2017-04-25 NOTE — LETTER
4/25/2017       RE: Santos Rajan  [address]  [facility] [address]     Dear Colleague,    Thank you for referring your patient, Santos Rajan, to the [facility] at [facility]. Please see a copy of my visit note below.    VASCULAR SURGERY CLINIC ESTABLISHED PATIENT NOTE    HPI:    Mr. Rajan is a 61- year old male with history of HTN, Smoker, 1/2-1 ppd for 40 years and left leg severe lifestyle limiting claudication who underwent elective   Endovascular Abdomnal Aortic Aneurysm Repair with Endologix AFX2 Bifurcated Endograft system and MORALES Proximal Endograft System. Left common and external iliac artery stents using Auctelia Epic Vascular Self-expanding stent system. Express LD iliac/biliary premounted stent system to the left and right common iliac arteries. Left femoral artery endarterectomy with LeMaitre Vascular biologiic patch with Dr. Granados on 4/18/2017.  His hospital course was uncomplicated and he was discharged to home on 4/20/2017.  He returns today to clinic for incision check and ABIs.       SUBJECTIVE:  Patient reports his low back pain and left buttock pain is improved since discharge.  He cut back smoking to 5 cigarettes and still using nicotine patches. He removed his Prevena wound vac on Sunday, 4/23/2017 due to it losing suction.     OBJECTIVE:    Vital signs:    /72 (BP Location: Left arm)  Pulse 71  Resp 16  SpO2 97%        Prior to Admission medications    Medication Sig Start Date End Date Taking? Authorizing Provider   sennosides (SENOKOT) 8.6 MG tablet Take 1-2 tablets by mouth 2 times daily as needed for constipation 4/20/17   Citlaly Santo APRN CNS   acetaminophen (TYLENOL) 325 MG tablet Take 2 tablets (650 mg) by mouth every 4 hours as needed for other (surgical pain) 4/21/17   Citlaly Santo APRN CNS   acetaminophen (TYLENOL) 325 MG tablet Take 3 tablets (975 mg) by mouth every 8 hours  4/19/17   Citlaly Santo APRN CNS   cilostazol (PLETAL) 50 MG tablet Take 1 tablet (50 mg) by mouth 2 times daily 4/19/17   Citlaly Santo APRN CNS   clopidogrel (PLAVIX) 75 MG tablet Take 1 tablet (75 mg) by mouth daily 4/19/17   Citlaly Santo APRN CNS   nicotine (NICODERM CQ) 21 MG/24HR 24 hr patch Place 1 patch onto the skin every 24 hours 4/19/17   Citlaly Santo APRN CNS   traMADol (ULTRAM) 50 MG tablet Take 1-2 tablets ( mg) by mouth every 6 hours as needed for pain 4/19/17   Citlaly Santo APRN CNS   hydrocortisone 2.5 % cream APPLY TO FACE TOPICALLY BID 4/9/17   Reported, Patient   rosuvastatin (CRESTOR) 10 MG tablet Take 1 tablet by mouth at bedtime 4/4/17   Reported, Patient   benzonatate (TESSALON) 200 MG capsule Take 1 capsule (200 mg) by mouth 3 times daily as needed for cough 4/11/17   Roly Tomas PA-C   albuterol (PROAIR HFA/PROVENTIL HFA/VENTOLIN HFA) 108 (90 BASE) MCG/ACT Inhaler Inhale 2 puffs into the lungs every 6 hours as needed for shortness of breath / dyspnea or wheezing 4/11/17   Roly Tomas PA-C   aspirin EC 81 MG EC tablet Take 1 tablet (81 mg) by mouth daily  Patient taking differently: Take 81 mg by mouth every evening  3/22/17   Mike Burk MD   metoprolol (TOPROL-XL) 25 MG 24 hr tablet Take 1 tablet (25 mg) by mouth daily  Patient taking differently: Take 25 mg by mouth every evening  3/22/17   Mike Burk MD   amLODIPine-benazepril (LOTREL) 10-40 MG per capsule Take 1 capsule by mouth daily  Patient taking differently: Take 1 capsule by mouth every evening  1/10/17   Roly Tomas PA-C       PHYSICAL EXAM:  NEURO/PSYCH: The patient is alert and oriented.  Appropriate.  Moves all extremities.   SKIN: Color appropriate for race, warm, dry.  PULMONARY: non-labored breathing     EXTREMITIES: left groin incision C/D/I, no signs of infection, palpable bilateral PT and DP pulses, right groin puncture site soft,  without obvious hematoma, left arm access site soft    KIRILL RESULTS:  Right KIRILL 1.15, previously 0.81 on 2/21/17  Left KIRILL 1.17, previously 0.56 on 2/21/17      ASSESSMENT:  61 year old male with Abdominal Aortic Aneurysm and lifestyle limiting claudication from aortoiliac occlusive disease who is status post endo AAA repair with bilateral iliac artery stenting and left femoral artery endarterectomy with patch.    Patient Active Problem List   Diagnosis     CARDIOVASCULAR SCREENING; LDL GOAL LESS THAN 160     Impaired fasting glucose     Low testosterone     Fatty liver     Umbilical hernia     Colon polyps     Advanced directives, counseling/discussion     Benign essential hypertension     Diverticulosis of large intestine without hemorrhage     Status post coronary angiogram     AAA (abdominal aortic aneurysm) without rupture (H)     Aorto-iliac atherosclerosis (H)           PLAN:  -follow up with Dr. Granados set up for next week and CT scan in 2-3 weeks  - continue pletal, Plavix, ASA and Crestor   - okay to shower  - smoking cessation strongly encouraged         Citlaly LEBLANC, CNS  Division of Vascular Surgery  [facility]  Pager [phone]

## 2017-04-26 NOTE — OP NOTE
DATE OF SERVICE: 04/18/2017     PREOPERATIVE DIAGNOSIS:  Lifestyle-limiting claudication, abdominal aortic aneurysm, non-ruptured      POSTOPERATIVE DIAGNOSIS:  Lifestyle-limiting claudication, abdominal aortic aneurysm, non-ruptured    PROCEDURES: Ultrasound-guided left brachial artery access, right common femoral artery access  2.  Placement of catheter into aorta  3.  Placement of Endoluminal AAA graft with unibody device, Endologix JCH53-10/I16-30, cpt 85090  4.  Placement of proximal extension prosthesis, A28-28/C75, cpt 74985  5.  Radiographic and interpretation of unibody placement and proximal extension piece, cpe 87242-66, 35871-91  6.  Placement of right common iliac artery stent, Xpress 10x37  7.  Placement of left common  iliac artery stents x 2 and recannulization of occluded iliac artery, Xpress 10x37 and Epic 9x40  8.  Placement of left external iliac artery stents x 2, Epic 8x60 and Epic 8x40  9.  Radiographic and interpretation of iliac stenting  10.  Left common femoral artery endarterectomy and patch angioplasty with bovine pericardial patch      ATTENDING SURGEON: Nelly Granados MD       FELLOW SURGEON:  Christian Gonzalez MD    ANESTHESIA:  General      COMPLICATIONS: None    EBL: 200 mL    INSTRUMENT COUNTS:  Correct    INDICATIONS: This is a 61 year old gentleman with severe lifestyle-limiting claudication and a substantial smoking history.  He was worked up preoperatively and found to have a TASC D aortoiliac occlusion with a 4.7 cm infrarenal AAA.  He underwent a stress test which was concerning for reversible ischemia and subsequently underwent coronary angiogram.  After imaging and discussion with cardiology, no further intervention was required at this time for his CAD and should be managed medically.  He was thus cleared for endovascular recannulization of his aortoiliac segment and repair of his aneurysm.      PROCEDURE:  The patient was consented and taken to the operating room. General  anesthesia was performed. The patient was prepped and draped in sterile fashion and timeout procedure was performed.   Under ultrasound guidance, bilateral femoral arteries were accessed using a micropuncture technique and subsequently a 5 Fr. Sheath was placed in each.  We attempted via both femoral arteries recanalization via the retrograde approach, however we were subintimal on both attempts on each side.  We then used an ultrasound and accessed the left brachial artery using a micropuncture technique and placed a 4Fr sheath.  We cannulated the distal thoracic aorta easily, heparinized the patient for goal act greater than 200, and easily crossed the right iliac artery lesion.  Once the 0.035 stiff glidewire and catheter were across the lesion, we cannulated hte right femoral sheath and gained accessed.  We had thru-and-thru access and then passed a KMP catheter over the wire into the distal thoracic aorta from the right groin.  A stiff glidewire was then passed into the KMP from the right side.  We then pulled the wire back from the left brachial access and upsized the sheath to a 4Fr by 90 Buck-0 sheath and cannulated the distal abdominal aorta.  Catheter, sheath and wire were then placed towards the left iliac artery system.  This was a flush occlusion, but with some time we were able to engage the origin and crossed the total occlusion without difficulty.  Again we cannulated the 5Fr sheath in the left groin to obtain thru-and-thru access and then brought a catheter up from the left side to true lumen in the proximal abdominal aorta.  We then began to balloon angioplasty via both groin access and predilated the occluded aortoiliac segment using 4x40 balloons.  We then placed two proglides in the right groin and replaced the sheath with an 11Fr sheath.  After this, we then easily passed the 17Fr AFX sheath into the right femoral artery over a Lunderquist wire and upsized the left sheath to a 7Fr sheath.  We  then placed the [id number] unibody device onto the Lunderquist wire and advanced the contralateral wire up thru the 17Fr sheath.  The Contralateral snare catheter captured the wire and pulled it out thru the left groin.  The [id number] device was then advanced under fluoro until the distal limbs were above the aortic bifurcation.  We pulled the entire system down snug onto the native aortic bifurcation.  We deployed the main body of the graft and then deployed the contralateral limb by pulling the yellow limb cover.  This was unlocked by advancing a pigtail catheter over the contralateral wire until the tipe was in contact with the wire lock and then it was released.  We deployed the remaining ipsilateral limb.  We then placed 10x4 balloons bilaterally and first used the right to advance the sheath up thru the right iliac system, and then used the left balloon that was inflated to pull downward traction onto the graft as the sheath was advanced to prevent movement of the graft.  Once the sheath was above the graft, the proximal extension piece was placed and confirmatory angiogram was performed for placedment of the graft just distal to the renal arteries.  The 28-75 proximal extension piece was deployed without incident.  Angiogram confirmed patency of bilateral renal arteries after the proximal extension piece was deployed.  We then removed the device from the right groin, leaving the 17Fr sheath in place.  Bilateral 10x37 Xpress stents were placed within each of the respective main body iliac limbs, with care taken not to elevate the bifurcation.  The right iliac system appeared improved, however the left iliac system continued be diminutive and have high grade stenosis for outflow.  We subsequently placed a 9x40 Epic self-expanding stent in the distal left common iliac, with post-dilatation using a Whitwell balloon.  A 8x60 Epic and then a 8x40 Epic stents were placed in the left external iliac artery, again  with post-dilatation using a Bogue Chitto balloon.  Improved outflow was noted into the iliac system, with moderate stenosis in the left common femoral artery.  The decision was then made to perform left common femoral artery endarterectomy for patency outcomes.  Prior to cutdown on the left, completion imaging ensued and the right 17Fr sheath was removed, the Proglides cinched down, and hemostasis was achieved.  Pressure was held in the right groin for 20 minutes.    A longitudinal incision was made in the left groin over top of the 7Fr sheath.  The subcutaneous tissue and lymphatics were tied off and hemo-clipped.  The inguinal ligament was identified and mobilized medially and laterally in the usual fashion.  We then identified the common femoral artery.  Proximal control was obtained around the distal external iliac artery using a vessel loop in the Gomez fashion.  We marched distally and obtained distal control around the superficial and deep profunda arteries as well as side branches.  The patient had remained heparinized throughout the entire procedure and the artery was clamped, and the 7Fr sheath was removed.  A longitudinal arteriotomy was performed and taken onto the profunda artery.  Endarterectomy was then performed with good proximal and distal endpoints identified.  After completing the endarterectomy, we placed a bovine pericardial patch and performed the anastomosis using 5-0 Prolene suture.  Back bleeding prior to completion of the anastomosis ensued.  We then completed the anastomosis and restored flow to the extremity.  The patient had a good palpable PT pulse.  Hemostasis was achieved in the surgical bed.  The groin was closed in a multilayer fashion using 2-0, 3-0 vicryl and 4-0 Monocryl.  Dermabond was placed and the Prevana dressing was applied to the left groin.  The sheath from the left brachial site was removed and pressure held in the left arm for 20 minutes and the arm immobilized for 6 hrs  on an arm-immobilizer board.  The patient tolerated the procedure well, was extubated, had bilateral palpable pulses, and was transferred to the PACU in stable condition.        RADIOGRAPHIC AND INTERPRETATION:   1.  Patent abdominal aorta with small aneurysm noted.  High grade stenosis of the right common iliac artery, occluded left common iliac artery, patent bilateral internal iliac arteries, high-grade stenosis of left external iliac artery, patent right external iliac artery.  2.  Successful recannulization of the bilateral iliac artery lesions, with successful placement of the AFX2 unibody device.  3.  Successful placement of the proximal extension piece of the AFX2 with patent bilateral renal arteries.  4.  Successful placement of bilateral common iliac stents, and left external iliac stents.  5.  Moderate stenosis of the left common femoral artery with patent SFA and profunda artery.  6.  No endoleak noted at completion with excellent flow into the entire aortoiliac segment.    Nelly Granados MD

## 2017-04-27 DIAGNOSIS — I25.83 CORONARY ATHEROSCLEROSIS DUE TO LIPID RICH PLAQUE: ICD-10-CM

## 2017-04-27 RX ORDER — METOPROLOL SUCCINATE 25 MG/1
25 TABLET, EXTENDED RELEASE ORAL EVERY EVENING
Qty: 90 TABLET | Refills: 3 | Status: SHIPPED | OUTPATIENT
Start: 2017-04-27 | End: 2017-11-03

## 2017-05-01 ENCOUNTER — OFFICE VISIT (OUTPATIENT)
Dept: VASCULAR SURGERY | Facility: CLINIC | Age: 62
End: 2017-05-01

## 2017-05-01 VITALS
SYSTOLIC BLOOD PRESSURE: 120 MMHG | OXYGEN SATURATION: 96 % | RESPIRATION RATE: 16 BRPM | DIASTOLIC BLOOD PRESSURE: 76 MMHG | HEART RATE: 74 BPM

## 2017-05-01 DIAGNOSIS — I71.40 ABDOMINAL AORTIC ANEURYSM (AAA) WITHOUT RUPTURE (H): Primary | ICD-10-CM

## 2017-05-01 ASSESSMENT — PAIN SCALES - GENERAL: PAINLEVEL: NO PAIN (0)

## 2017-05-01 NOTE — LETTER
5/1/2017       RE: Santos Rajan  [address]  [facility] [facility] [address]     Dear Colleague,    Thank you for referring your patient, Santos Rajan, to the [facility] at [facility]. Please see a copy of my visit note below.    Vascular surgery staff  S/p Endo-ABF  Palpable pulses distally  L groin incision healing nicely  Encouraged to quit smoking  Follow up in a few weeks  Nelly Granados MD

## 2017-05-01 NOTE — NURSING NOTE
Chief Complaint   Patient presents with     RECHECK     AAA follow up        Vitals:    05/01/17 1531   BP: 120/76   BP Location: Right arm   Pulse: 74   Resp: 16   SpO2: 96%       There is no height or weight on file to calculate BMI.                 Mehreen Montano LPN

## 2017-05-01 NOTE — MR AVS SNAPSHOT
After Visit Summary   5/1/2017    Santos Rajan    MRN: [id number]           Patient Information     Date Of Birth          1955        Visit Information        Provider Department      5/1/2017 3:00 PM Nelly Granados MD [facility]        Today's Diagnoses     Abdominal aortic aneurysm (AAA) without rupture (H)    -  1       Follow-ups after your visit        Your next 10 appointments already scheduled     May 15, 2017 11:20 AM CDT   CTA ANGIOGRAM ABDOMEN PELVIS with UUCT1   [facility], [place], [place] ([facility], [facility])    [address] [id number] [phone]           Please bring any scans or X-rays taken at other hospitals, if similar tests were done. Also bring a list of your medicines, including vitamins, minerals and over-the-counter drugs. It is safest to leave personal items at home.  Be sure to tell your doctor:   If you have any allergies.   If there s any chance you are pregnant.   If you are breastfeeding.   If you have any special needs.  You will have contrast for this exam. To prepare:   Do not eat or drink for 2 hours before your exam. If you need to take medicine, you may take it with small sips of water. (We may ask you to take liquid medicine as well.)   The day before your exam, drink extra fluids at least six 8-ounce glasses (unless your doctor tells you to restrict your fluids).  Patients over 70 or patients with diabetes or kidney problems:   If you haven t had a blood test (creatinine test) within the last 30 days, go to your clinic or Diagnostic Imaging Department for this test.  If you have diabetes:   If your kidney function is normal, continue taking your metformin (Avandamet, Glucophage, Glucovance, Metaglip) on the day of your exam.   If your kidney function is abnormal, wait 48 hours before restarting this medicine.  Please wear loose clothing, such as a sweat suit or jogging clothes.  Avoid snaps, zippers and other metal. We may ask you to undress and put on a hospital gown.  If you have any questions, please call the Imaging Department where you will have your exam.            May 15, 2017 12:30 PM CDT   (Arrive by 12:15 PM)   Return Vascular Visit with Nelly Granados MD   [facility] ([facility] [place])    [address]  [address]rd [facility] [address] [phone]              Future tests that were ordered for you today     Open Future Orders        Priority Expected Expires Ordered    CT angio abdomen/pelvis w & wo contrast Routine  5/1/2018 5/1/2017            Who to contact     Please call your clinic at [phone] to:    Ask questions about your health    Make or cancel appointments    Discuss your medicines    Learn about your test results    Speak to your doctor   If you have compliments or concerns about an experience at your clinic, or if you wish to file a complaint, please contact [facility] Patient Relations at [phone] or email us at [email or web address]         Additional Information About Your Visit        Face to Face Livehart Information     [employer] gives you secure access to your electronic health record. If you see a primary care provider, you can also send messages to your care team and make appointments. If you have questions, please call your primary care clinic.  If you do not have a primary care provider, please call [phone] and they will assist you.      [employer] is an electronic gateway that provides easy, online access to your medical records. With [employer], you can request a clinic appointment, read your test results, renew a prescription or communicate with your care team.     To access your existing account, please contact your [facility] or call [phone] for assistance.        Care EveryWhere ID     This is your Care EveryWhere ID. This could be  used by other organizations to access your [place] medical records  [id number]        Your Vitals Were     Pulse Respirations Pulse Oximetry             74 16 96%          Blood Pressure from Last 3 Encounters:   05/01/17 120/76   04/25/17 122/72   04/20/17 109/68    Weight from Last 3 Encounters:   04/18/17 240 lb 4.8 oz   04/11/17 241 lb 6.4 oz   03/28/17 236 lb                 Today's Medication Changes          These changes are accurate as of: 5/1/17  3:34 PM.  If you have any questions, ask your nurse or doctor.               These medicines have changed or have updated prescriptions.        Dose/Directions    amLODIPine-benazepril 10-40 MG per capsule   Commonly known as:  LOTREL   This may have changed:  when to take this   Used for:  Benign essential hypertension        Dose:  1 capsule   Take 1 capsule by mouth daily   Quantity:  90 capsule   Refills:  1       aspirin 81 MG EC tablet   This may have changed:  when to take this   Used for:  Coronary atherosclerosis due to lipid rich plaque        Dose:  81 mg   Take 1 tablet (81 mg) by mouth daily   Quantity:  60 tablet   Refills:  3                Primary Care Provider Office Phone # Fax #    Roly Tomas PA-C [phone]       [facility] [place] [address]  [place] [address]        Thank you!     Thank you for choosing [facility]  for your care. Our goal is always to provide you with excellent care. Hearing back from our patients is one way we can continue to improve our services. Please take a few minutes to complete the written survey that you may receive in the mail after your visit with us. Thank you!             Your Updated Medication List - Protect others around you: Learn how to safely use, store and throw away your medicines at www.disposemymeds.org.          This list is accurate as of: 5/1/17  3:34 PM.  Always use your most recent med list.                   Brand Name Dispense Instructions for use     * acetaminophen 325 MG tablet    TYLENOL    100 tablet    Take 3 tablets (975 mg) by mouth every 8 hours       * acetaminophen 325 MG tablet    TYLENOL    100 tablet    Take 2 tablets (650 mg) by mouth every 4 hours as needed for other (surgical pain)       albuterol 108 (90 BASE) MCG/ACT Inhaler    PROAIR HFA/PROVENTIL HFA/VENTOLIN HFA    1 Inhaler    Inhale 2 puffs into the lungs every 6 hours as needed for shortness of breath / dyspnea or wheezing       amLODIPine-benazepril 10-40 MG per capsule    LOTREL    90 capsule    Take 1 capsule by mouth daily       aspirin 81 MG EC tablet     60 tablet    Take 1 tablet (81 mg) by mouth daily       benzonatate 200 MG capsule    TESSALON    30 capsule    Take 1 capsule (200 mg) by mouth 3 times daily as needed for cough       cilostazol 50 MG tablet    PLETAL    60 tablet    Take 1 tablet (50 mg) by mouth 2 times daily       clopidogrel 75 MG tablet    PLAVIX    30 tablet    Take 1 tablet (75 mg) by mouth daily       hydrocortisone 2.5 % cream      APPLY TO FACE TOPICALLY BID       metoprolol 25 MG 24 hr tablet    TOPROL-XL    90 tablet    Take 1 tablet (25 mg) by mouth every evening       nicotine 21 MG/24HR 24 hr patch    NICODERM CQ    30 patch    Place 1 patch onto the skin every 24 hours       rosuvastatin 10 MG tablet    CRESTOR     Take 1 tablet by mouth at bedtime       sennosides 8.6 MG tablet    SENOKOT    120 each    Take 1-2 tablets by mouth 2 times daily as needed for constipation       traMADol 50 MG tablet    ULTRAM    50 tablet    Take 1-2 tablets ( mg) by mouth every 6 hours as needed for pain       * Notice:  This list has 2 medication(s) that are the same as other medications prescribed for you. Read the directions carefully, and ask your doctor or other care provider to review them with you.

## 2017-05-03 NOTE — PROGRESS NOTES
Vascular surgery staff  S/p Endo-ABF  Palpable pulses distally  L groin incision healing nicely  Encouraged to quit smoking  Follow up in a few weeks  Nelly Granados MD

## 2017-05-10 ASSESSMENT — ENCOUNTER SYMPTOMS
JOINT SWELLING: 0
MUSCLE WEAKNESS: 0
FATIGUE: 1
ARTHRALGIAS: 0
FEVER: 0
CHILLS: 0
MUSCLE CRAMPS: 0
MYALGIAS: 1
STIFFNESS: 0
POLYPHAGIA: 0
NIGHT SWEATS: 1
WEIGHT LOSS: 0
ALTERED TEMPERATURE REGULATION: 0
INCREASED ENERGY: 0
BACK PAIN: 1
NECK PAIN: 0
HALLUCINATIONS: 0
WEIGHT GAIN: 0
POLYDIPSIA: 0
DECREASED APPETITE: 0

## 2017-05-15 ENCOUNTER — OFFICE VISIT (OUTPATIENT)
Dept: VASCULAR SURGERY | Facility: CLINIC | Age: 62
End: 2017-05-15

## 2017-05-15 ENCOUNTER — HOSPITAL ENCOUNTER (OUTPATIENT)
Dept: CT IMAGING | Facility: CLINIC | Age: 62
Discharge: HOME | End: 2017-05-15
Attending: SURGERY | Admitting: SURGERY
Payer: COMMERCIAL

## 2017-05-15 VITALS
RESPIRATION RATE: 18 BRPM | HEART RATE: 65 BPM | DIASTOLIC BLOOD PRESSURE: 82 MMHG | SYSTOLIC BLOOD PRESSURE: 131 MMHG | OXYGEN SATURATION: 99 %

## 2017-05-15 DIAGNOSIS — I74.09 AORTOILIAC OCCLUSIVE DISEASE (H): ICD-10-CM

## 2017-05-15 DIAGNOSIS — I71.40 ABDOMINAL AORTIC ANEURYSM (AAA) WITHOUT RUPTURE (H): Primary | ICD-10-CM

## 2017-05-15 DIAGNOSIS — I71.40 ABDOMINAL AORTIC ANEURYSM (AAA) WITHOUT RUPTURE (H): ICD-10-CM

## 2017-05-15 PROCEDURE — 74174 CTA ABD&PLVS W/CONTRAST: CPT

## 2017-05-15 PROCEDURE — 40000141 ZZH STATISTIC PERIPHERAL IV START W/O US GUIDANCE

## 2017-05-15 PROCEDURE — 25500064 ZZH RX 255 OP 636: Performed by: RADIOLOGY

## 2017-05-15 RX ORDER — IOPAMIDOL 755 MG/ML
100 INJECTION, SOLUTION INTRAVASCULAR ONCE
Status: COMPLETED | OUTPATIENT
Start: 2017-05-15 | End: 2017-05-15

## 2017-05-15 RX ADMIN — IOPAMIDOL 100 ML: 755 INJECTION, SOLUTION INTRAVENOUS at 11:29

## 2017-05-15 ASSESSMENT — PAIN SCALES - GENERAL: PAINLEVEL: NO PAIN (0)

## 2017-05-15 NOTE — LETTER
5/15/2017       RE: Santos Rajan  [address]  [place] [facility] [address]     Dear Colleague,    Thank you for referring your patient, Santos Rajan, to the [facility] at [facility]. Please see a copy of my visit note below.    Vascular surgery staff    S/p Endo-ABF with Endologix afx2, B MEHUL stents, L EIA stent and L CFA endarterectomy on 4/18/2017  Doing great.  L groin healing well. Small area at inferior portion of incision with scab noted  Palpable pulses distally  CTA reviewed, stents patent and without endoleak noted  Does note odd penile numbness not associated with anything in particular.  The B internal iliac arteries are patent.  He tells me he does not have problem with erection.  Overall he is progressing appropriately and without issues or complications from the procedure.  Follow up in 2 months.  Continue current medication regimen.  Nelly Granados MD

## 2017-05-15 NOTE — NURSING NOTE
Chief Complaint   Patient presents with     RECHECK     Follow up AAA       Vitals:    05/15/17 1231   BP: 131/82   BP Location: Right arm   Pulse: 65   Resp: 18   SpO2: 99%       There is no height or weight on file to calculate BMI.                 Mehreen Montano LPN

## 2017-05-15 NOTE — MR AVS SNAPSHOT
After Visit Summary   5/15/2017    Santos Rajan    MRN: [id number]           Patient Information     Date Of Birth          1955        Visit Information        Provider Department      5/15/2017 12:30 PM Nelly Granados MD [facility]         Follow-ups after your visit        Your next 10 appointments already scheduled     Jul 10, 2017  3:15 PM CDT   (Arrive by 3:00 PM)   Return Vascular Visit with Nelly Granados MD   [facility] ([facility] [place])    [address] [place] [address] [phone]              Who to contact     Please call your clinic at [phone] to:    Ask questions about your health    Make or cancel appointments    Discuss your medicines    Learn about your test results    Speak to your doctor   If you have compliments or concerns about an experience at your clinic, or if you wish to file a complaint, please contact [facility] Patient Relations at [phone] or email us at [email or web address]         Additional Information About Your Visit        MyChart Information     [employer] gives you secure access to your electronic health record. If you see a primary care provider, you can also send messages to your care team and make appointments. If you have questions, please call your primary care clinic.  If you do not have a primary care provider, please call [phone] and they will assist you.      [employer] is an electronic gateway that provides easy, online access to your medical records. With [employer], you can request a clinic appointment, read your test results, renew a prescription or communicate with your care team.     To access your existing account, please contact your [facility] or call [phone] for assistance.        Care EveryWhere ID     This is your Care EveryWhere ID. This could be used by other  organizations to access your [place] medical records  [id number]        Your Vitals Were     Pulse Respirations Pulse Oximetry             65 18 99%          Blood Pressure from Last 3 Encounters:   05/15/17 131/82   05/01/17 120/76   04/25/17 122/72    Weight from Last 3 Encounters:   04/18/17 240 lb 4.8 oz   04/11/17 241 lb 6.4 oz   03/28/17 236 lb              Today, you had the following     No orders found for display         Today's Medication Changes          These changes are accurate as of: 5/15/17  1:20 PM.  If you have any questions, ask your nurse or doctor.               These medicines have changed or have updated prescriptions.        Dose/Directions    amLODIPine-benazepril 10-40 MG per capsule   Commonly known as:  LOTREL   This may have changed:  when to take this   Used for:  Benign essential hypertension        Dose:  1 capsule   Take 1 capsule by mouth daily   Quantity:  90 capsule   Refills:  1       aspirin 81 MG EC tablet   This may have changed:  when to take this   Used for:  Coronary atherosclerosis due to lipid rich plaque        Dose:  81 mg   Take 1 tablet (81 mg) by mouth daily   Quantity:  60 tablet   Refills:  3                Primary Care Provider Office Phone # Fax #    Roly Tomas PA-C [phone]       [facility] [place] [address]  [place] [address]        Thank you!     Thank you for choosing [facility]  for your care. Our goal is always to provide you with excellent care. Hearing back from our patients is one way we can continue to improve our services. Please take a few minutes to complete the written survey that you may receive in the mail after your visit with us. Thank you!             Your Updated Medication List - Protect others around you: Learn how to safely use, store and throw away your medicines at www.disposemymeds.org.          This list is accurate as of: 5/15/17  1:20 PM.  Always use your most recent med list.                    Brand Name Dispense Instructions for use    * acetaminophen 325 MG tablet    TYLENOL    100 tablet    Take 3 tablets (975 mg) by mouth every 8 hours       * acetaminophen 325 MG tablet    TYLENOL    100 tablet    Take 2 tablets (650 mg) by mouth every 4 hours as needed for other (surgical pain)       albuterol 108 (90 BASE) MCG/ACT Inhaler    PROAIR HFA/PROVENTIL HFA/VENTOLIN HFA    1 Inhaler    Inhale 2 puffs into the lungs every 6 hours as needed for shortness of breath / dyspnea or wheezing       amLODIPine-benazepril 10-40 MG per capsule    LOTREL    90 capsule    Take 1 capsule by mouth daily       aspirin 81 MG EC tablet     60 tablet    Take 1 tablet (81 mg) by mouth daily       benzonatate 200 MG capsule    TESSALON    30 capsule    Take 1 capsule (200 mg) by mouth 3 times daily as needed for cough       cilostazol 50 MG tablet    PLETAL    60 tablet    Take 1 tablet (50 mg) by mouth 2 times daily       clopidogrel 75 MG tablet    PLAVIX    30 tablet    Take 1 tablet (75 mg) by mouth daily       hydrocortisone 2.5 % cream      APPLY TO FACE TOPICALLY BID       metoprolol 25 MG 24 hr tablet    TOPROL-XL    90 tablet    Take 1 tablet (25 mg) by mouth every evening       nicotine 21 MG/24HR 24 hr patch    NICODERM CQ    30 patch    Place 1 patch onto the skin every 24 hours       rosuvastatin 10 MG tablet    CRESTOR     Take 1 tablet by mouth at bedtime       sennosides 8.6 MG tablet    SENOKOT    120 each    Take 1-2 tablets by mouth 2 times daily as needed for constipation       traMADol 50 MG tablet    ULTRAM    50 tablet    Take 1-2 tablets ( mg) by mouth every 6 hours as needed for pain       * Notice:  This list has 2 medication(s) that are the same as other medications prescribed for you. Read the directions carefully, and ask your doctor or other care provider to review them with you.

## 2017-05-18 ENCOUNTER — CARE COORDINATION (OUTPATIENT)
Dept: CARE COORDINATION | Facility: CLINIC | Age: 62
End: 2017-05-18

## 2017-05-18 DIAGNOSIS — Z76.89 HEALTH CARE HOME: ICD-10-CM

## 2017-05-18 NOTE — PROGRESS NOTES
Vascular surgery staff    S/p Endo-ABF with Endologix afx2, B MEHUL stents, L EIA stent and L CFA endarterectomy on 4/18/2017  Doing great.  L groin healing well. Small area at inferior portion of incision with scab noted  Palpable pulses distally  CTA reviewed, stents patent and without endoleak noted  Does note odd penile numbness not associated with anything in particular.  The B internal iliac arteries are patent.  He tells me he does not have problem with erection.  Overall he is progressing appropriately and without issues or complications from the procedure.  Follow up in 2 months.  Continue current medication regimen.  Nelly Granados MD    Answers for HPI/ROS submitted by the patient on 5/10/2017   General Symptoms: Yes  Skin Symptoms: No  HENT Symptoms: No  EYE SYMPTOMS: No  HEART SYMPTOMS: No  LUNG SYMPTOMS: No  INTESTINAL SYMPTOMS: No  URINARY SYMPTOMS: No  REPRODUCTIVE SYMPTOMS: No  SKELETAL SYMPTOMS: Yes  BLOOD SYMPTOMS: No  NERVOUS SYSTEM SYMPTOMS: No  MENTAL HEALTH SYMPTOMS: No  Fever: No  Loss of appetite: No  Weight loss: No  Weight gain: No  Fatigue: Yes  Night sweats: Yes  Chills: No  Increased stress: No  Excessive hunger: No  Excessive thirst: No  Feeling hot or cold when others believe the temperature is normal: No  Loss of height: No  Post-operative complications: No  Surgical site pain: No  Hallucinations: No  Change in or Loss of Energy: No  Hyperactivity: No  Confusion: No  Back pain: Yes  Muscle aches: Yes  Neck pain: No  Swollen joints: No  Joint pain: No  Bone pain: No  Muscle cramps: No  Muscle weakness: No  Joint stiffness: No  Bone fracture: No

## 2017-05-18 NOTE — LETTER
[place] [facility]  [address] [place] [address]  [phone]      May 18, 2017      Santos Rajan  [address] [facility] [address]    Dear Santos,  I am the Clinic Care Coordinator that works with your primary care provider's clinic. I wanted to introduce myself and provide you with my contact information for you to be able to call me with any questions or concerns. Below is a description of what Clinic Care Coordination is and how I can further assist you.     The Clinic Care Coordinator role is a Registered Nurse and/or  who understands the health care system. The goal of Clinic Care Coordination is to help you manage your health and improve access to the [facility] system in the most efficient manner.  The Registered Nurse can assist you in meeting your health care goals by providing education, coordinating services, and strengthening the communication among your providers. The  can assist you with financial, behavioral, psychosocial, and chemical dependency and counseling/psychiatric resources.    Please feel free to keep this letter and contact information to contact me at [phone], [phone] with any further questions or concerns that may arise. We at [facility] are focused on providing you with the highest-quality healthcare experience possible and that all starts with you.       Sincerely,     Attila Calabrese RN  Clinic Care Coordinator      Enclosed: I have enclosed a copy of a 24 Hour Access Plan. This has helpful phone numbers for you to call when needed. Please keep this in an easy to access place to use as needed.

## 2017-05-18 NOTE — LETTER
Health Care Home - Access Care Plan    About Me  Patient Name:  Santos Rajan    YOB: 1955  Age:                            61 year old   Kathleen MRN:         [id number] Telephone Information:     Home Phone [phone]   Mobile [phone]       Address:    [address] [facility] [address] Email address:  [email or web address]      Emergency Contact(s)  Name Relationship Lgl Grd Work Phone Home Phone Mobile Phone   1. DARION RAJAN  Spouse   [phone]   2. ELIZABETH BRASHER Other   [phone]              Health Maintenance:      My Access Plan  Medical Emergency 91   Questions or concerns during clinic hours Primary Clinic Line, I will call the clinic directly: [facility] [phone]   24 Hour Appointment Line [phone] or  [phone])  (toll free)   24 Hour Nurse Line [phone] (toll free)   Questions or concerns outside clinic hours 24 Hour Appointment Line, I will call the after-hours on-call line:   [facility] [phone] or [phone]) (toll-free)   Preferred Urgent Care Preferred Urgent Care: [facility], [phone]   Preferred Hospital Preferred Hospital: [place], [place]  [phone]   Preferred Pharmacy [facility] [address] [place] [address] [address] [facility] [address]     Behavioral Health Crisis Line [facility], [phone] or 911     My Care Team Members  Patient Care Team       Relationship Specialty Notifications Start End    Roly Tomas PA-C PCP - General Physician Assistant  8/12/16     Phone: [phone] Fax: [phone]         [facility] [address] [place] [address]    Nico Alva MD Referring Physician Family Medicine - Sports Medicine  2/23/17     Comment:  referring to Vascular    Phone: [phone] Fax: [phone]         [address]  [address] [facility] [address]    Nelly Granados MD MD Vascular Surgery  2/23/17     Phone: [phone] Fax: [phone]         [facility] [address] [facility] [address] [facility] [address]        My Medical and Care Information  Problem List   Patient Active Problem List   Diagnosis     CARDIOVASCULAR SCREENING; LDL GOAL LESS THAN 160     Impaired fasting glucose     Low testosterone     Fatty liver     Umbilical hernia     Colon polyps     Advanced directives, counseling/discussion     Benign essential hypertension     Diverticulosis of large intestine without hemorrhage     Status post coronary angiogram     AAA (abdominal aortic aneurysm) without rupture (H)     Aorto-iliac atherosclerosis (H)     Health Care Home      Current Medications and Allergies:  See printed Medication Report

## 2017-05-18 NOTE — PROGRESS NOTES
Clinic Care Coordination Contact  [facility]/Voicemail    Referral Source: [facility]  Clinical Data: Care Coordinator Outreach  Outreach attempted x 1.  Left message on voicemail with call back information and requested return call.  Plan: Care Coordinator will mail out care coordination introduction letter with care coordinator contact information and explanation of care coordination services. Care Coordinator will try to reach patient again in 14 business days.  Attila Calabrese RN  Clinic Care Coordinator  [phone] or [phone]

## 2017-06-05 NOTE — PROGRESS NOTES
Clinic Care Coordination Contact  [facility]/Voicemail    Referral Source: [facility]  Clinical Data: Care Coordinator Outreach  Outreach attempted x 2.  Left message on voicemail with call back information and requested return call.  Plan: Care Coordinator mailed out care coordination introduction letter on 5-18. Care Coordinator will do no further outreaches at this time.  Attila Calabrese RN  Clinic Care Coordinator  [phone] or [phone]

## 2017-06-07 ENCOUNTER — TELEPHONE (OUTPATIENT)
Dept: FAMILY MEDICINE | Facility: CLINIC | Age: 62
End: 2017-06-07

## 2017-06-07 NOTE — TELEPHONE ENCOUNTER
Patient states he wonders if you will fit him in as he had vascular surgery a couple weeks ago and he is having pain in both of his legs (sore throat too) and does not want to wait until Tuesday.  Please call.    Thank you.

## 2017-06-07 NOTE — TELEPHONE ENCOUNTER
Left message on voice mail for patient to call clinic. [phone]  Will send to PCP in the meantime  to advise.  No openings available, patient requesting to be fit in.  Fit in? See colleague?

## 2017-06-09 ENCOUNTER — OFFICE VISIT (OUTPATIENT)
Dept: FAMILY MEDICINE | Facility: CLINIC | Age: 62
End: 2017-06-09
Payer: COMMERCIAL

## 2017-06-09 VITALS
SYSTOLIC BLOOD PRESSURE: 110 MMHG | WEIGHT: 244 LBS | RESPIRATION RATE: 18 BRPM | OXYGEN SATURATION: 98 % | HEART RATE: 62 BPM | DIASTOLIC BLOOD PRESSURE: 70 MMHG | BODY MASS INDEX: 35.01 KG/M2 | TEMPERATURE: 98.1 F

## 2017-06-09 DIAGNOSIS — M51.9 LUMBAR DISC DISORDER: ICD-10-CM

## 2017-06-09 DIAGNOSIS — I70.0 AORTO-ILIAC ATHEROSCLEROSIS (H): ICD-10-CM

## 2017-06-09 DIAGNOSIS — I70.8 AORTO-ILIAC ATHEROSCLEROSIS (H): ICD-10-CM

## 2017-06-09 DIAGNOSIS — G57.13 MERALGIA PARESTHETICA OF BOTH LOWER EXTREMITIES: Primary | ICD-10-CM

## 2017-06-09 DIAGNOSIS — I10 BENIGN ESSENTIAL HYPERTENSION: ICD-10-CM

## 2017-06-09 DIAGNOSIS — M79.652 PAIN IN BOTH THIGHS: ICD-10-CM

## 2017-06-09 DIAGNOSIS — M79.651 PAIN IN BOTH THIGHS: ICD-10-CM

## 2017-06-09 PROCEDURE — 99214 OFFICE O/P EST MOD 30 MIN: CPT | Performed by: PHYSICIAN ASSISTANT

## 2017-06-09 RX ORDER — AMLODIPINE AND BENAZEPRIL HYDROCHLORIDE 10; 40 MG/1; MG/1
1 CAPSULE ORAL DAILY
Qty: 90 CAPSULE | Refills: 1 | Status: SHIPPED | OUTPATIENT
Start: 2017-06-09 | End: 2017-09-26

## 2017-06-09 RX ORDER — GABAPENTIN 100 MG/1
100 CAPSULE ORAL 3 TIMES DAILY
Qty: 60 CAPSULE | Refills: 0 | Status: SHIPPED | OUTPATIENT
Start: 2017-06-09 | End: 2017-11-03

## 2017-06-09 RX ORDER — ROSUVASTATIN CALCIUM 10 MG/1
10 TABLET, COATED ORAL AT BEDTIME
Qty: 90 TABLET | Refills: 1 | Status: SHIPPED | OUTPATIENT
Start: 2017-06-09 | End: 2017-07-10

## 2017-06-09 RX ORDER — CILOSTAZOL 50 MG/1
50 TABLET ORAL 2 TIMES DAILY
Qty: 60 TABLET | Refills: 3 | Status: SHIPPED | OUTPATIENT
Start: 2017-06-09 | End: 2017-06-09

## 2017-06-09 RX ORDER — CLOPIDOGREL BISULFATE 75 MG/1
75 TABLET ORAL DAILY
Qty: 30 TABLET | Refills: 3 | Status: SHIPPED | OUTPATIENT
Start: 2017-06-09 | End: 2017-06-09

## 2017-06-09 RX ORDER — PREDNISONE 10 MG/1
TABLET ORAL
Qty: 42 TABLET | Refills: 0 | Status: SHIPPED | OUTPATIENT
Start: 2017-06-09 | End: 2017-07-13

## 2017-06-09 NOTE — TELEPHONE ENCOUNTER
Requesting a 90 day supply    cilostazil      Last Written Prescription Date:  6/9/17  Last Fill Quantity: 180,   # refills: 3  Last Office Visit with [facility], [facility] or  [facility] prescribing provider: 6/9/17  Future Office visit:       Routing refill request to provider for review/approval because:  Drug not on the [facility], [facility] or [facility] refill protocol or controlled substance    Clopidogril           Last Written Prescription Date: 6/9/17  Last Fill Quantity: 90, # refills: 3    Last Office Visit with [facility], [facility] or [facility] prescribing provider:  6/9/17   Future Office Visit:       Lab Results   Component Value Date    WBC 13.3 04/20/2017     Lab Results   Component Value Date    RBC 4.11 04/20/2017     Lab Results   Component Value Date    HGB 12.6 04/20/2017     Lab Results   Component Value Date    HCT 40.2 04/20/2017     No components found for: MCT  Lab Results   Component Value Date    MCV 98 04/20/2017     Lab Results   Component Value Date    MCH 30.7 04/20/2017     Lab Results   Component Value Date    MCHC 31.3 04/20/2017     Lab Results   Component Value Date    RDW 14.1 04/20/2017     Lab Results   Component Value Date     04/20/2017     Lab Results   Component Value Date    AST 21 04/18/2017     Lab Results   Component Value Date    ALT 19 04/18/2017     Creatinine   Date Value Ref Range Status   04/20/2017 0.61 (L) 0.66 - 1.25 mg/dL Final   ]

## 2017-06-09 NOTE — MR AVS SNAPSHOT
After Visit Summary   6/9/2017    Santos Rajan    MRN: [id number]           Patient Information     Date Of Birth          1955        Visit Information        Provider Department      6/9/2017 9:00 AM Roly Tomas PA-C [facility]        Today's Diagnoses     Meralgia paresthetica of both lower extremities    -  1    Lumbar disc disorder        Aorto-iliac atherosclerosis (H)        Benign essential hypertension        Pain in both thighs           Follow-ups after your visit        Additional Services     [facility] PT, HAND, AND CHIROPRACTIC REFERRAL       **This order will print in the [facility] Scheduling Office**    Physical Therapy, Hand Therapy and Chiropractic Care are available through:    *[facility]  *[facility]  *[facility]    Call one number to schedule at any of the above locations: [phone].    Your provider has referred you to: Physical Therapy at [facility] or [facility]    Indication/Reason for Referral: bilateral thigh pain, suspect meralgia paresthetica  Onset of Illness: 1 mos  Therapy Orders: Evaluate and Treat  Special Programs: None  Special Request: None    Thelma Renee      Additional Comments for the Therapist or Chiropractor:     Please be aware that coverage of these services is subject to the terms and limitations of your health insurance plan.  Call member services at your health plan with any benefit or coverage questions.      Please bring the following to your appointment:    *Your personal calendar for scheduling future appointments  *Comfortable clothing                  Your next 10 appointments already scheduled     Jul 10, 2017  3:15 PM CDT   (Arrive by 3:00 PM)   Return Vascular Visit with Nelly Granados MD   [facility] ([facility])    [address] [place] [id number] [phone]              Future tests that were ordered for  you today     Open Future Orders        Priority Expected Expires Ordered    US Lower Extremity Venous Duplex Bilateral Routine  6/9/2018 6/9/2017            Who to contact     Normal or non-critical lab and imaging results will be communicated to you by [facility], letter or phone within 4 business days after the clinic has received the results. If you do not hear from us within 7 days, please contact the clinic through [employer] or phone. If you have a critical or abnormal lab result, we will notify you by phone as soon as possible.  Submit refill requests through [employer] or call your pharmacy and they will forward the refill request to us. Please allow 3 business days for your refill to be completed.          If you need to speak with a  for additional information , please call: [phone]             Additional Information About Your Visit        [employer] Information     [employer] gives you secure access to your electronic health record. If you see a primary care provider, you can also send messages to your care team and make appointments. If you have questions, please call your primary care clinic.  If you do not have a primary care provider, please call [phone] and they will assist you.        Care EveryWhere ID     This is your Care EveryWhere ID. This could be used by other organizations to access your [facility] medical records  [id number]        Your Vitals Were     Pulse Temperature Respirations Pulse Oximetry BMI (Body Mass Index)       62 98.1  F (36.7  C) (Tympanic) 18 98% 35.01 kg/m2        Blood Pressure from Last 3 Encounters:   06/09/17 110/70   05/15/17 131/82   05/01/17 120/76    Weight from Last 3 Encounters:   06/09/17 244 lb (110.7 kg)   04/18/17 240 lb 4.8 oz (109 kg)   04/11/17 241 lb 6.4 oz (109.5 kg)              We Performed the Following     [facility] PT, HAND, AND CHIROPRACTIC REFERRAL          Today's Medication Changes          These changes are accurate as of: 6/9/17 10:11  AM.  If you have any questions, ask your nurse or doctor.               Start taking these medicines.        Dose/Directions    gabapentin 100 MG capsule   Commonly known as:  NEURONTIN   Used for:  Lumbar disc disorder   Started by:  Roly Tomas PA-C        Dose:  100 mg   Take 1 capsule (100 mg) by mouth 3 times daily   Quantity:  60 capsule   Refills:  0       predniSONE 10 MG tablet   Commonly known as:  DELTASONE   Used for:  Meralgia paresthetica of both lower extremities, Pain in both thighs   Started by:  Roly Tomas PA-C        Take 60 mg days 1 and 2, 50 mg days 3 and 4, 40 mg days 5 and 6, 30 mg days 7 and 8, 20 mg days 9 and 10, 10 mg days 11 and 12   Quantity:  42 tablet   Refills:  0         These medicines have changed or have updated prescriptions.        Dose/Directions    acetaminophen 325 MG tablet   Commonly known as:  TYLENOL   This may have changed:  Another medication with the same name was removed. Continue taking this medication, and follow the directions you see here.   Used for:  Aorto-iliac atherosclerosis (H)        Dose:  650 mg   Take 2 tablets (650 mg) by mouth every 4 hours as needed for other (surgical pain)   Quantity:  100 tablet   Refills:  0       amLODIPine-benazepril 10-40 MG per capsule   Commonly known as:  LOTREL   This may have changed:  when to take this   Used for:  Benign essential hypertension        Dose:  1 capsule   Take 1 capsule by mouth daily   Quantity:  90 capsule   Refills:  1       aspirin 81 MG EC tablet   This may have changed:  when to take this   Used for:  Coronary atherosclerosis due to lipid rich plaque        Dose:  81 mg   Take 1 tablet (81 mg) by mouth daily   Quantity:  60 tablet   Refills:  3       rosuvastatin 10 MG tablet   Commonly known as:  CRESTOR   This may have changed:  See the new instructions.   Used for:  Aorto-iliac atherosclerosis (H)   Changed by:  Roly Tomas PA-C        Dose:  10 mg   Take 1 tablet (10 mg) by  mouth At Bedtime   Quantity:  90 tablet   Refills:  1         Stop taking these medicines if you haven't already. Please contact your care team if you have questions.     albuterol 108 (90 BASE) MCG/ACT Inhaler   Commonly known as:  PROAIR HFA/PROVENTIL HFA/VENTOLIN HFA   Stopped by:  Roly Tomas PA-C           benzonatate 200 MG capsule   Commonly known as:  TESSALON   Stopped by:  Roly Tomas PA-C                Where to get your medicines      These medications were sent to [id number] - [place], [place] - [address] [facility] [address]  [address], [place] [facility] [address]     Phone:  [phone]     amLODIPine-benazepril 10-40 MG per capsule    cilostazol 50 MG tablet    clopidogrel 75 MG tablet    predniSONE 10 MG tablet    rosuvastatin 10 MG tablet         Some of these will need a paper prescription and others can be bought over the counter.  Ask your nurse if you have questions.     Bring a paper prescription for each of these medications     gabapentin 100 MG capsule                Primary Care Provider Office Phone # Fax #    Roly Tomas PA-C [phone]       [facility] [address] [facility] [address]        Thank you!     Thank you for choosing [facility]  for your care. Our goal is always to provide you with excellent care. Hearing back from our patients is one way we can continue to improve our services. Please take a few minutes to complete the written survey that you may receive in the mail after your visit with us. Thank you!             Your Updated Medication List - Protect others around you: Learn how to safely use, store and throw away your medicines at www.disposemymeds.org.          This list is accurate as of: 6/9/17 10:11 AM.  Always use your most recent med list.                   Brand Name Dispense Instructions for use    acetaminophen 325 MG tablet    TYLENOL    100 tablet    Take 2 tablets (650  mg) by mouth every 4 hours as needed for other (surgical pain)       amLODIPine-benazepril 10-40 MG per capsule    LOTREL    90 capsule    Take 1 capsule by mouth daily       aspirin 81 MG EC tablet     60 tablet    Take 1 tablet (81 mg) by mouth daily       cilostazol 50 MG tablet    PLETAL    60 tablet    Take 1 tablet (50 mg) by mouth 2 times daily       clopidogrel 75 MG tablet    PLAVIX    30 tablet    Take 1 tablet (75 mg) by mouth daily       gabapentin 100 MG capsule    NEURONTIN    60 capsule    Take 1 capsule (100 mg) by mouth 3 times daily       hydrocortisone 2.5 % cream      APPLY TO FACE TOPICALLY BID       metoprolol 25 MG 24 hr tablet    TOPROL-XL    90 tablet    Take 1 tablet (25 mg) by mouth every evening       nicotine 21 MG/24HR 24 hr patch    NICODERM CQ    30 patch    Place 1 patch onto the skin every 24 hours       predniSONE 10 MG tablet    DELTASONE    42 tablet    Take 60 mg days 1 and 2, 50 mg days 3 and 4, 40 mg days 5 and 6, 30 mg days 7 and 8, 20 mg days 9 and 10, 10 mg days 11 and 12       rosuvastatin 10 MG tablet    CRESTOR    90 tablet    Take 1 tablet (10 mg) by mouth At Bedtime       sennosides 8.6 MG tablet    SENOKOT    120 each    Take 1-2 tablets by mouth 2 times daily as needed for constipation       traMADol 50 MG tablet    ULTRAM    50 tablet    Take 1-2 tablets ( mg) by mouth every 6 hours as needed for pain

## 2017-06-09 NOTE — TELEPHONE ENCOUNTER
Routing refill request to provider for review/approval because:  Pended for approval.  Shawna Painting RN

## 2017-06-10 RX ORDER — CLOPIDOGREL BISULFATE 75 MG/1
TABLET ORAL
Qty: 90 TABLET | Refills: 0 | Status: SHIPPED | OUTPATIENT
Start: 2017-06-10 | End: 2017-09-05

## 2017-06-10 RX ORDER — CILOSTAZOL 50 MG/1
TABLET ORAL
Qty: 180 TABLET | Refills: 0 | Status: SHIPPED | OUTPATIENT
Start: 2017-06-10 | End: 2017-11-03

## 2017-06-21 ENCOUNTER — RADIANT APPOINTMENT (OUTPATIENT)
Dept: ULTRASOUND IMAGING | Facility: CLINIC | Age: 62
End: 2017-06-21
Attending: PHYSICIAN ASSISTANT
Payer: COMMERCIAL

## 2017-06-21 DIAGNOSIS — M79.652 PAIN IN BOTH THIGHS: ICD-10-CM

## 2017-06-21 DIAGNOSIS — M79.651 PAIN IN BOTH THIGHS: ICD-10-CM

## 2017-06-21 PROCEDURE — 93970 EXTREMITY STUDY: CPT

## 2017-06-26 ASSESSMENT — ENCOUNTER SYMPTOMS
WEIGHT LOSS: 0
CHILLS: 0
WEIGHT GAIN: 0
MUSCLE WEAKNESS: 0
FATIGUE: 0
DECREASED APPETITE: 0
JOINT SWELLING: 0
POLYDIPSIA: 0
STIFFNESS: 0
FEVER: 0
INCREASED ENERGY: 0
POLYPHAGIA: 0
MYALGIAS: 1
ARTHRALGIAS: 0
MUSCLE CRAMPS: 0
NECK PAIN: 0
ALTERED TEMPERATURE REGULATION: 0
BACK PAIN: 0
HALLUCINATIONS: 0
NIGHT SWEATS: 0

## 2017-07-10 ENCOUNTER — OFFICE VISIT (OUTPATIENT)
Dept: VASCULAR SURGERY | Facility: CLINIC | Age: 62
End: 2017-07-10

## 2017-07-10 VITALS
OXYGEN SATURATION: 100 % | RESPIRATION RATE: 19 BRPM | DIASTOLIC BLOOD PRESSURE: 80 MMHG | SYSTOLIC BLOOD PRESSURE: 130 MMHG | HEART RATE: 68 BPM
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
## 2017-07-10 DIAGNOSIS — T14.8XXA ABRASION OF SKIN WITH INFECTION: ICD-10-CM

## 2017-07-10 DIAGNOSIS — L08.9 ABRASION OF SKIN WITH INFECTION: ICD-10-CM

## 2017-07-10 DIAGNOSIS — I70.8 AORTO-ILIAC ATHEROSCLEROSIS (H): ICD-10-CM

## 2017-07-10 DIAGNOSIS — I70.0 AORTO-ILIAC ATHEROSCLEROSIS (H): ICD-10-CM

## 2017-07-10 DIAGNOSIS — M25.552 HIP PAIN, LEFT: ICD-10-CM

## 2017-07-10 RX ORDER — ROSUVASTATIN CALCIUM 20 MG/1
20 TABLET, COATED ORAL AT BEDTIME
Qty: 30 TABLET | Refills: 3 | Status: SHIPPED | OUTPATIENT
Start: 2017-07-10 | End: 2017-11-03

## 2017-07-10 RX ORDER — TRAMADOL HYDROCHLORIDE 50 MG/1
50 TABLET ORAL EVERY 6 HOURS PRN
Qty: 50 TABLET | Refills: 0 | Status: SHIPPED | OUTPATIENT
Start: 2017-07-10 | End: 2017-07-13

## 2017-07-10 ASSESSMENT — PAIN SCALES - GENERAL: PAINLEVEL: SEVERE PAIN (7)

## 2017-07-10 NOTE — PATIENT INSTRUCTIONS
1. Increase rosuvastatin (Crestor) to 20 mg daily. Okay to take 2- 10 mg tablets until gone, then refill prescription. Prescription was sent to your Pharmacy in [place].     2. Augmentin prescription for concern of right leg skin/soft tissue infection sent to Pharmacy in [place].    3. Please see your PCP this week for your right leg. Present to an emergency department for fever, chills, uncontrolled pain, or large amount of drainage.     DEANA Dennison, CNP  Division of Vascular Surgery  [facility]  Pager: [phone]  Office: [phone]    Ashley Pemberton, Vascular Surgery Nurse  [phone]    Dr. Nelly Granados MD  Vascular Surgery   [facility]  [address] [place] [address], [address] [id number] [phone]

## 2017-07-10 NOTE — PROGRESS NOTES
VASCULAR SURGERY CLINIC ESTABLISHED PATIENT NOTE    HPI:  Mr. Rajan is a 61- year old male who underwent endovascular abdominal aortic aneurysm repair with Endologix bifurcated endograft, left common and external iliac artery stents, and left femoral artery endarterectomy on 4/18/2017. He returns to clinic today for 3- month follow-up.     SUBJECTIVE: Endorses a fall about 1- week ago; mechanical fall tripping over a small metal step stool. He states he struck his anterior shin, swelling/discoloration was immediate, and he was immediately ambulatory. No loss of consciousness. Over past several days has noted open areas/redness on anterior shin, which he relates to submersion of wound into an ice bucket for comfort. He stopped taking Plavix and aspirin in order to take ibuprofen for swelling after the injury.     He has been able to walk 3 times weekly, 10-20 minutes each time. Feels as though he is improving.     OBJECTIVE:  Vital signs:  130/80  68  19    Prior to Admission medications    Medication Sig Start Date End Date Taking? Authorizing Provider   rosuvastatin (CRESTOR) 20 MG tablet Take 1 tablet (20 mg) by mouth At Bedtime 7/10/17  Yes Cecille Kinney APRN CNP   amoxicillin-clavulanate (AUGMENTIN) 875-125 MG per tablet Take 1 tablet by mouth 2 times daily 7/10/17  Yes Cecille Kinney APRN CNP   traMADol (ULTRAM) 50 MG tablet Take 1 tablet (50 mg) by mouth every 6 hours as needed for pain 7/10/17  Yes Cecille Kinney APRN CNP   cilostazol (PLETAL) 50 MG tablet TAKE 1 TABLET(50 MG) BY MOUTH TWICE DAILY 6/10/17   Roly Tomas PA-C   clopidogrel (PLAVIX) 75 MG tablet TAKE 1 TABLET BY MOUTH EVERY DAY 6/10/17   Roly Tomas PA-C   gabapentin (NEURONTIN) 100 MG capsule Take 1 capsule (100 mg) by mouth 3 times daily 6/9/17   Roly Tomas PA-C   amLODIPine-benazepril (LOTREL) 10-40 MG per capsule Take 1 capsule by mouth daily 6/9/17   Roly Tomas PA-C   predniSONE (DELTASONE) 10 MG  tablet Take 60 mg days 1 and 2, 50 mg days 3 and 4, 40 mg days 5 and 6, 30 mg days 7 and 8, 20 mg days 9 and 10, 10 mg days 11 and 12 6/9/17   Roly Tomas PA-C   metoprolol (TOPROL-XL) 25 MG 24 hr tablet Take 1 tablet (25 mg) by mouth every evening 4/27/17   FILIBERTO De Anda MD   sennosides (SENOKOT) 8.6 MG tablet Take 1-2 tablets by mouth 2 times daily as needed for constipation 4/20/17   Citlaly Santo APRN CNS   acetaminophen (TYLENOL) 325 MG tablet Take 2 tablets (650 mg) by mouth every 4 hours as needed for other (surgical pain) 4/21/17   Citlaly Santo APRN CNS   nicotine (NICODERM CQ) 21 MG/24HR 24 hr patch Place 1 patch onto the skin every 24 hours 4/19/17   Citlaly Santo APRN CNS   hydrocortisone 2.5 % cream APPLY TO FACE TOPICALLY BID 4/9/17   Reported, Patient   aspirin EC 81 MG EC tablet Take 1 tablet (81 mg) by mouth daily  Patient taking differently: Take 81 mg by mouth every evening  3/22/17   Mike Burk MD       PHYSICAL EXAM:  NEURO/PSYCH: The patient is alert and oriented.  Appropriate.  Moves all extremities.   SKIN: Large, boggy, red, warm area on anterior right lower leg after fall as described above. Has obvious signs of infection, no purulent drainage. Significant bruising from right knee through toes; 3 + edema.   PULMONARY: No supplemental oxygen; no acute distress.   EXTREMITIES: Bilateral pedal pulses palpable.       ASSESSMENT/PLAN:  #1 Status post endovascular abdominal aortic aneurysm repair, 4/18/2017  #2 Peripheral artery disease, secondary to atherosclerosis  #3 Status post bilateral iliac artery stenting and left femoral artery endarterectomy with patch angioplasty, 4/18/2017  #4 Fall, mechanical, from standing  #5 Concern for skin/soft tissue infection, secondary to #4  - highly recommend staying on Plavix, aspirin, and Pletal  - increase rosuvastatin to 20 mg; recommend going to 40 mg, as tolerated  - rosuvastatin prescription sent to  Pharmacy; further refills per PCP  - Augmentin for concern of skin/soft tissue infection  - Tramadol for pain  - CT and xray today; needs to follow with PCP this week for wound check    ANTI-PLATELET: Aspirin, Plavix  ANTI-COAGULANT: Not warranted  STATIN: Increased rosuvastatin to 20 mg daily  DIABETES MEDICATIONS: Not diabetic  TOBACCO CESSATION: Continues 5 cigarettes/day; highly encouraged to quit    Please contact Dr. Granados's Vascular Surgery Service with questions or concerns.    Cecille Kinney APRN, CNP  Division of Vascular Surgery  [facility]  Pager: [phone]    I personally examined the patient and agree with the findings above.  I discussed the patient with the resident / fellow and care team, and agree with the assessment and plan of care as documented in the note.  Vascular surgery staff    Palpable distal pulses bilaterally  R leg very bruised and swollen.  Obtain imaging and needs to see his pcp asap.  Nelly Granados MD

## 2017-07-10 NOTE — NURSING NOTE
Chief Complaint   Patient presents with     RECHECK     Follow up stent placement        Vitals:    07/10/17 1519   BP: 130/80   BP Location: Left arm   Pulse: 68   Resp: 19   SpO2: 100%       There is no height or weight on file to calculate BMI.               Mehreen Montano LPN

## 2017-07-10 NOTE — MR AVS SNAPSHOT
After Visit Summary   7/10/2017    Santos Rajan    MRN: [id number]           Patient Information     Date Of Birth          1955        Visit Information        Provider Department      7/10/2017 3:15 PM Nelly Granados MD [facility]        Today's Diagnoses     Fall, initial encounter    -  1    Aorto-iliac atherosclerosis (H)        Abrasion of skin with infection        Hip pain, left          Care Instructions    1. Increase rosuvastatin (Crestor) to 20 mg daily. Okay to take 2- 10 mg tablets until gone, then refill prescription. Prescription was sent to your Pharmacy in [place].     2. Augmentin prescription for concern of right leg skin/soft tissue infection sent to Pharmacy in [place].    3. Please see your PCP this week for your right leg. Present to an emergency department for fever, chills, uncontrolled pain, or large amount of drainage.     DEANA Dennison, CNP  Division of Vascular Surgery  [facility]  Pager: [phone]  Office: [phone]    Ashley Pemberton, Vascular Surgery Nurse  [phone]    Dr. Nelly Granados MD  Vascular Surgery   [facility]  [address] [place] [address], [place] [address]  [phone]            Follow-ups after your visit        Future tests that were ordered for you today     Open Future Orders        Priority Expected Expires Ordered    CT Lower extremity rt w/o contrast Routine  7/10/2018 7/10/2017    X-ray rt lower leg Routine 7/10/2017 7/10/2018 7/10/2017            Who to contact     Please call your clinic at [phone] to:    Ask questions about your health    Make or cancel appointments    Discuss your medicines    Learn about your test results    Speak to your doctor   If you have compliments or concerns about an experience at your clinic, or if you wish to file a complaint, please contact [facility] Patient Relations at [phone] or email us at  [email or web address]         Additional Information About Your Visit        Nodalityhart Information     FanMobt gives you secure access to your electronic health record. If you see a primary care provider, you can also send messages to your care team and make appointments. If you have questions, please call your primary care clinic.  If you do not have a primary care provider, please call [phone] and they will assist you.      Flytenow is an electronic gateway that provides easy, online access to your medical records. With Flytenow, you can request a clinic appointment, read your test results, renew a prescription or communicate with your care team.     To access your existing account, please contact your [facility] or call [phone] for assistance.        Care EveryWhere ID     This is your Care EveryWhere ID. This could be used by other organizations to access your [place] medical records  [id number]        Your Vitals Were     Pulse Respirations Pulse Oximetry             68 19 100%          Blood Pressure from Last 3 Encounters:   07/10/17 130/80   06/09/17 110/70   05/15/17 131/82    Weight from Last 3 Encounters:   06/09/17 244 lb   04/18/17 240 lb 4.8 oz   04/11/17 241 lb 6.4 oz                 Today's Medication Changes          These changes are accurate as of: 7/10/17  3:58 PM.  If you have any questions, ask your nurse or doctor.               Start taking these medicines.        Dose/Directions    amoxicillin-clavulanate 875-125 MG per tablet   Commonly known as:  AUGMENTIN   Used for:  Abrasion of skin with infection   Started by:  Nelly Granados MD        Dose:  1 tablet   Take 1 tablet by mouth 2 times daily   Quantity:  28 tablet   Refills:  0         These medicines have changed or have updated prescriptions.        Dose/Directions    aspirin 81 MG EC tablet   This may have changed:  when to take this   Used for:  Coronary atherosclerosis due  to lipid rich plaque        Dose:  81 mg   Take 1 tablet (81 mg) by mouth daily   Quantity:  60 tablet   Refills:  3       rosuvastatin 20 MG tablet   Commonly known as:  CRESTOR   This may have changed:    - medication strength  - how much to take   Used for:  Aorto-iliac atherosclerosis (H)   Changed by:  Nelly Granados MD        Dose:  20 mg   Take 1 tablet (20 mg) by mouth At Bedtime   Quantity:  30 tablet   Refills:  3       traMADol 50 MG tablet   Commonly known as:  ULTRAM   This may have changed:  how much to take   Used for:  Hip pain, left   Changed by:  Nelly Granados MD        Dose:  50 mg   Take 1 tablet (50 mg) by mouth every 6 hours as needed for pain   Quantity:  50 tablet   Refills:  0            Where to get your medicines      These medications were sent to [id number] - [place], [place] - [address]  AT [facility] [address]  [address], [facility] [address]     Phone:  [phone]     amoxicillin-clavulanate 875-125 MG per tablet    rosuvastatin 20 MG tablet         Some of these will need a paper prescription and others can be bought over the counter.  Ask your nurse if you have questions.     Bring a paper prescription for each of these medications     traMADol 50 MG tablet                Primary Care Provider Office Phone # Fax #    Roly Tomas PA-C [phone]       [facility] [address] [facility] [address]        Equal Access to Services     BISMARK ALVAREZ AH: Hadii aad ku hadasho Soomaali, waaxda luqadaha, qaybta kaalmada adeegyada, waxay abdulkadir haymedardon adelyle kharatyree wild. So [facility] [phone].    ATENCIÓN: Si habla español, tiene a hernandez disposición servicios gratuitos de asistencia lingüística. Barotlo al [phone].    We comply with applicable federal civil rights laws and [place] laws. We do not discriminate on the basis of race, color, national origin, age, disability sex, sexual orientation or gender  identity.            Thank you!     Thank you for choosing [facility]  for your care. Our goal is always to provide you with excellent care. Hearing back from our patients is one way we can continue to improve our services. Please take a few minutes to complete the written survey that you may receive in the mail after your visit with us. Thank you!             Your Updated Medication List - Protect others around you: Learn how to safely use, store and throw away your medicines at www.disposemymeds.org.          This list is accurate as of: 7/10/17  3:58 PM.  Always use your most recent med list.                   Brand Name Dispense Instructions for use Diagnosis    acetaminophen 325 MG tablet    TYLENOL    100 tablet    Take 2 tablets (650 mg) by mouth every 4 hours as needed for other (surgical pain)    Aorto-iliac atherosclerosis (H)       amLODIPine-benazepril 10-40 MG per capsule    LOTREL    90 capsule    Take 1 capsule by mouth daily    Benign essential hypertension       amoxicillin-clavulanate 875-125 MG per tablet    AUGMENTIN    28 tablet    Take 1 tablet by mouth 2 times daily    Abrasion of skin with infection       aspirin 81 MG EC tablet     60 tablet    Take 1 tablet (81 mg) by mouth daily    Coronary atherosclerosis due to lipid rich plaque       cilostazol 50 MG tablet    PLETAL    180 tablet    TAKE 1 TABLET(50 MG) BY MOUTH TWICE DAILY    Aorto-iliac atherosclerosis (H)       clopidogrel 75 MG tablet    PLAVIX    90 tablet    TAKE 1 TABLET BY MOUTH EVERY DAY    Aorto-iliac atherosclerosis (H)       gabapentin 100 MG capsule    NEURONTIN    60 capsule    Take 1 capsule (100 mg) by mouth 3 times daily    Lumbar disc disorder       hydrocortisone 2.5 % cream      APPLY TO FACE TOPICALLY BID        metoprolol 25 MG 24 hr tablet    TOPROL-XL    90 tablet    Take 1 tablet (25 mg) by mouth every evening    Coronary atherosclerosis due to lipid rich plaque       nicotine 21 MG/24HR 24 hr  patch    NICODERM CQ    30 patch    Place 1 patch onto the skin every 24 hours    Tobacco abuse       predniSONE 10 MG tablet    DELTASONE    42 tablet    Take 60 mg days 1 and 2, 50 mg days 3 and 4, 40 mg days 5 and 6, 30 mg days 7 and 8, 20 mg days 9 and 10, 10 mg days 11 and 12    Meralgia paresthetica of both lower extremities, Pain in both thighs       rosuvastatin 20 MG tablet    CRESTOR    30 tablet    Take 1 tablet (20 mg) by mouth At Bedtime    Aorto-iliac atherosclerosis (H)       sennosides 8.6 MG tablet    SENOKOT    120 each    Take 1-2 tablets by mouth 2 times daily as needed for constipation    Aorto-iliac atherosclerosis (H)       traMADol 50 MG tablet    ULTRAM    50 tablet    Take 1 tablet (50 mg) by mouth every 6 hours as needed for pain    Hip pain, left

## 2017-07-10 NOTE — LETTER
7/10/2017       RE: Santos Rajan  [address]  [facility] [address]     Dear Colleague,    Thank you for referring your patient, Santos Rajan, to the [facility] at [facility]. Please see a copy of my visit note below.    VASCULAR SURGERY CLINIC ESTABLISHED PATIENT NOTE    HPI:  Mr. Rajan is a 61- year old male who underwent endovascular abdominal aortic aneurysm repair with Endologix bifurcated endograft, left common and external iliac artery stents, and left femoral artery endarterectomy on 4/18/2017. He returns to clinic today for 3- month follow-up.     SUBJECTIVE: Endorses a fall about 1- week ago; mechanical fall tripping over a small metal step stool. He states he struck his anterior shin, swelling/discoloration was immediate, and he was immediately ambulatory. No loss of consciousness. Over past several days has noted open areas/redness on anterior shin, which he relates to submersion of wound into an ice bucket for comfort. He stopped taking Plavix and aspirin in order to take ibuprofen for swelling after the injury.     He has been able to walk 3 times weekly, 10-20 minutes each time. Feels as though he is improving.     OBJECTIVE:  Vital signs:  130/80  68  19    Prior to Admission medications    Medication Sig Start Date End Date Taking? Authorizing Provider   rosuvastatin (CRESTOR) 20 MG tablet Take 1 tablet (20 mg) by mouth At Bedtime 7/10/17  Yes Cecille Kinney APRN CNP   amoxicillin-clavulanate (AUGMENTIN) 875-125 MG per tablet Take 1 tablet by mouth 2 times daily 7/10/17  Yes Cecille Kinney APRN CNP   traMADol (ULTRAM) 50 MG tablet Take 1 tablet (50 mg) by mouth every 6 hours as needed for pain 7/10/17  Yes Cecille Kinney APRN CNP   cilostazol (PLETAL) 50 MG tablet TAKE 1 TABLET(50 MG) BY MOUTH TWICE DAILY 6/10/17   Roly Tomas PA-C   clopidogrel (PLAVIX) 75 MG tablet TAKE 1 TABLET BY MOUTH EVERY DAY 6/10/17    Roly Tomas PA-C   gabapentin (NEURONTIN) 100 MG capsule Take 1 capsule (100 mg) by mouth 3 times daily 6/9/17   Roly Tomas PA-C   amLODIPine-benazepril (LOTREL) 10-40 MG per capsule Take 1 capsule by mouth daily 6/9/17   Roly Tomas PA-C   predniSONE (DELTASONE) 10 MG tablet Take 60 mg days 1 and 2, 50 mg days 3 and 4, 40 mg days 5 and 6, 30 mg days 7 and 8, 20 mg days 9 and 10, 10 mg days 11 and 12 6/9/17   Roly Tomas PA-C   metoprolol (TOPROL-XL) 25 MG 24 hr tablet Take 1 tablet (25 mg) by mouth every evening 4/27/17   FILIBERTO De Anda MD   sennosides (SENOKOT) 8.6 MG tablet Take 1-2 tablets by mouth 2 times daily as needed for constipation 4/20/17   Citlaly Santo APRN CNS   acetaminophen (TYLENOL) 325 MG tablet Take 2 tablets (650 mg) by mouth every 4 hours as needed for other (surgical pain) 4/21/17   Citlaly Santo APRN CNS   nicotine (NICODERM CQ) 21 MG/24HR 24 hr patch Place 1 patch onto the skin every 24 hours 4/19/17   Citlaly Santo APRN CNS   hydrocortisone 2.5 % cream APPLY TO FACE TOPICALLY BID 4/9/17   Reported, Patient   aspirin EC 81 MG EC tablet Take 1 tablet (81 mg) by mouth daily  Patient taking differently: Take 81 mg by mouth every evening  3/22/17   Mike Burk MD     PHYSICAL EXAM:  NEURO/PSYCH: The patient is alert and oriented.  Appropriate.  Moves all extremities.   SKIN: Large, boggy, red, warm area on anterior right lower leg after fall as described above. Has obvious signs of infection, no purulent drainage. Significant bruising from right knee through toes; 3 + edema.   PULMONARY: No supplemental oxygen; no acute distress.   EXTREMITIES: Bilateral pedal pulses palpable.       ASSESSMENT/PLAN:  #1 Status post endovascular abdominal aortic aneurysm repair, 4/18/2017  #2 Peripheral artery disease, secondary to atherosclerosis  #3 Status post bilateral iliac artery stenting and left femoral artery endarterectomy with patch  angioplasty, 4/18/2017  #4 Fall, mechanical, from standing  #5 Concern for skin/soft tissue infection, secondary to #4  - highly recommend staying on Plavix, aspirin, and Pletal  - increase rosuvastatin to 20 mg; recommend going to 40 mg, as tolerated  - rosuvastatin prescription sent to Pharmacy; further refills per PCP  - Augmentin for concern of skin/soft tissue infection  - Tramadol for pain  - CT and xray today; needs to follow with PCP this week for wound check    ANTI-PLATELET: Aspirin, Plavix  ANTI-COAGULANT: Not warranted  STATIN: Increased rosuvastatin to 20 mg daily  DIABETES MEDICATIONS: Not diabetic  TOBACCO CESSATION: Continues 5 cigarettes/day; highly encouraged to quit    Please contact Dr. Granados's Vascular Surgery Service with questions or concerns.    DEANA Dennison, CNP  Division of Vascular Surgery  [facility]  Pager: [phone]    I personally examined the patient and agree with the findings above.  I discussed the patient with the resident / fellow and care team, and agree with the assessment and plan of care as documented in the note.  Vascular surgery staff    Palpable distal pulses bilaterally  R leg very bruised and swollen.  Obtain imaging and needs to see his pcp asap.  Nelly Granados MD

## 2017-07-11 ENCOUNTER — TELEPHONE (OUTPATIENT)
Dept: OTHER | Facility: CLINIC | Age: 62
End: 2017-07-11

## 2017-07-11 NOTE — TELEPHONE ENCOUNTER
Called Mr. Rajan to let him know imaging did not reveal a right lower extremity fracture; however there is something in the anterior right leg that could be hematoma versus infection. Message highly recommends seeing PCP this week, sooner if he develops a fever.    DEANA Dennison, CNP  Division of Vascular Surgery  [facility]  Pager: [phone]

## 2017-07-13 ENCOUNTER — OFFICE VISIT (OUTPATIENT)
Dept: FAMILY MEDICINE | Facility: CLINIC | Age: 62
End: 2017-07-13
Payer: COMMERCIAL

## 2017-07-13 VITALS
TEMPERATURE: 98 F | BODY MASS INDEX: 35.87 KG/M2 | HEART RATE: 72 BPM | DIASTOLIC BLOOD PRESSURE: 88 MMHG | OXYGEN SATURATION: 96 % | SYSTOLIC BLOOD PRESSURE: 130 MMHG | WEIGHT: 250 LBS

## 2017-07-13 DIAGNOSIS — G89.29 OTHER CHRONIC PAIN: ICD-10-CM

## 2017-07-13 DIAGNOSIS — L03.119 CELLULITIS AND ABSCESS OF LEG: Primary | ICD-10-CM

## 2017-07-13 DIAGNOSIS — L02.419 CELLULITIS AND ABSCESS OF LEG: Primary | ICD-10-CM

## 2017-07-13 DIAGNOSIS — M25.552 HIP PAIN, LEFT: ICD-10-CM

## 2017-07-13 PROCEDURE — 99214 OFFICE O/P EST MOD 30 MIN: CPT | Performed by: NURSE PRACTITIONER

## 2017-07-13 RX ORDER — CEPHALEXIN 500 MG/1
500 CAPSULE ORAL 4 TIMES DAILY
Qty: 40 CAPSULE | Refills: 0 | Status: SHIPPED | OUTPATIENT
Start: 2017-07-13 | End: 2017-08-03

## 2017-07-13 RX ORDER — TRAMADOL HYDROCHLORIDE 50 MG/1
50 TABLET ORAL EVERY 6 HOURS PRN
Qty: 30 TABLET | Refills: 0 | Status: SHIPPED | OUTPATIENT
Start: 2017-07-13 | End: 2017-11-03

## 2017-07-13 NOTE — MR AVS SNAPSHOT
After Visit Summary   7/13/2017    Santos Rajan    MRN: [id number]           Patient Information     Date Of Birth          1955        Visit Information        Provider Department      7/13/2017 9:00 AM Manda Mcelroy NP [facility]        Today's Diagnoses     Cellulitis and abscess of leg    -  1    Other chronic pain        Hip pain, left          Care Instructions    Ibuprofen- 600 mg every 8 hours for few days with food to help with swelling. Epson salt water- warm water soaks 2-3 times per day. Take full course of antibiotics. Take daily probiotic- and eat yogurt daily. Follow up if symptoms persist or worsen.       Discharge Instructions for Cellulitis  You have been diagnosed with cellulitis. This is an infection in the deepest layer of the skin. In some cases, the infection also affects the muscle. Cellulitis is caused by bacteria. The bacteria can enter the body through broken skin. This can happen with a cut, scratch, animal bite, or an insect bite that has been scratched. You may have been treated in the hospital with antibiotics and fluids. You will likely be given a prescription for antibiotics to take at home. This sheet will help you take care of yourself at home.  Home care  When you are home:    Take the prescribed antibiotic medicine you are given as directed until it is gone. Take it even if you feel better. It treats the infection and stops it from returning. Not taking all the medicine can make future infections hard to treat.    Keep the infected area clean.    When possible, raise the infected area above the level of your heart. This helps keep swelling down.    Talk with your healthcare provider if you are in pain. Ask what kind of over-the-counter medicine you can take for pain.    Apply clean bandages as advised.    Take your temperature once a day for a week.    Wash your hands often to prevent spreading the infection.  In the future, wash your hands  before and after you touch cuts, scratches, or bandages. This will help prevent infection.   When to call your healthcare provider  Call your healthcare provider immediately if you have any of the following:    Difficulty or pain when moving the joints above or below the infected area    Discharge or pus draining from the area    Fever of 100.4 F (38 C) or higher, or as directed by your healthcare provider    Pain that gets worse in or around the infected     Redness that gets worse in or around the infected area, particularly if the area of redness expands to a wider area    Shaking chills    Swelling of the infected area    Vomiting   Date Last Reviewed: 8/1/2016 2000-2017 [employer]. [address] [place] [address], [place] [address]. All rights reserved. This information is not intended as a substitute for professional medical care. Always follow your healthcare professional's instructions.                Follow-ups after your visit        Follow-up notes from your care team     Return if symptoms worsen or fail to improve.      Who to contact     Normal or non-critical lab and imaging results will be communicated to you by Aptot, letter or phone within 4 business days after the clinic has received the results. If you do not hear from us within 7 days, please contact the clinic through Aptot or phone. If you have a critical or abnormal lab result, we will notify you by phone as soon as possible.  Submit refill requests through Kipo or call your pharmacy and they will forward the refill request to us. Please allow 3 business days for your refill to be completed.          If you need to speak with a  for additional information , please call: [phone]             Additional Information About Your Visit        Kipo Information     Kipo gives you secure access to your electronic health record. If you see a primary care provider, you can also send messages to your care team  and make appointments. If you have questions, please call your primary care clinic.  If you do not have a primary care provider, please call [phone] and they will assist you.        Care EveryWhere ID     This is your Care EveryWhere ID. This could be used by other organizations to access your [place] medical records  [id number]        Your Vitals Were     Pulse Temperature Pulse Oximetry BMI (Body Mass Index)          72 98  F (36.7  C) (Oral) 96% 35.87 kg/m2         Blood Pressure from Last 3 Encounters:   07/13/17 130/88   07/10/17 130/80   06/09/17 110/70    Weight from Last 3 Encounters:   07/13/17 250 lb (113.4 kg)   06/09/17 244 lb (110.7 kg)   04/18/17 240 lb 4.8 oz (109 kg)              We Performed the Following     Drug Abuse Screen Panel 13, Urine (Pain Care Package)          Today's Medication Changes          These changes are accurate as of: 7/13/17  9:32 AM.  If you have any questions, ask your nurse or doctor.               Start taking these medicines.        Dose/Directions    cephALEXin 500 MG capsule   Commonly known as:  KEFLEX   Used for:  Cellulitis and abscess of leg   Started by:  Manda Mcelroy, ANÍBAL        Dose:  500 mg   Take 1 capsule (500 mg) by mouth 4 times daily   Quantity:  40 capsule   Refills:  0         These medicines have changed or have updated prescriptions.        Dose/Directions    aspirin 81 MG EC tablet   This may have changed:  when to take this   Used for:  Coronary atherosclerosis due to lipid rich plaque        Dose:  81 mg   Take 1 tablet (81 mg) by mouth daily   Quantity:  60 tablet   Refills:  3            Where to get your medicines      These medications were sent to [facility] [address] [place], [place] [address]  AT [facility] [address]  [address] , [facility] [address]     Phone:  [phone]     cephALEXin 500 MG capsule         Some of these will need a paper prescription and others can be bought over the counter.  Ask  your nurse if you have questions.     Bring a paper prescription for each of these medications     traMADol 50 MG tablet                Primary Care Provider Office Phone # Fax #    Roly Tomas PA-C [phone]       [facility] [address] [facility] [address]        Equal Access to Services     BISMARK ALVAREZ : Hadii aad ku hadasho Soomaali, waaxda luqadaha, qaybta kaalmada adeegyada, waxay idiin hayaan adeeg kharash la'aan . So [facility] [phone].    ATENCIÓN: Si habla español, tiene a hernandez disposición servicios gratuitos de asistencia lingüística. Bartolo al [phone].    We comply with applicable federal civil rights laws and [place] laws. We do not discriminate on the basis of race, color, national origin, age, disability sex, sexual orientation or gender identity.            Thank you!     Thank you for choosing [facility]  for your care. Our goal is always to provide you with excellent care. Hearing back from our patients is one way we can continue to improve our services. Please take a few minutes to complete the written survey that you may receive in the mail after your visit with us. Thank you!             Your Updated Medication List - Protect others around you: Learn how to safely use, store and throw away your medicines at www.disposemymeds.org.          This list is accurate as of: 7/13/17  9:32 AM.  Always use your most recent med list.                   Brand Name Dispense Instructions for use Diagnosis    acetaminophen 325 MG tablet    TYLENOL    100 tablet    Take 2 tablets (650 mg) by mouth every 4 hours as needed for other (surgical pain)    Aorto-iliac atherosclerosis (H)       amLODIPine-benazepril 10-40 MG per capsule    LOTREL    90 capsule    Take 1 capsule by mouth daily    Benign essential hypertension       amoxicillin-clavulanate 875-125 MG per tablet    AUGMENTIN     TK 1 T PO BID        aspirin 81 MG EC tablet     60 tablet    Take 1 tablet  (81 mg) by mouth daily    Coronary atherosclerosis due to lipid rich plaque       cephALEXin 500 MG capsule    KEFLEX    40 capsule    Take 1 capsule (500 mg) by mouth 4 times daily    Cellulitis and abscess of leg       cilostazol 50 MG tablet    PLETAL    180 tablet    TAKE 1 TABLET(50 MG) BY MOUTH TWICE DAILY    Aorto-iliac atherosclerosis (H)       clopidogrel 75 MG tablet    PLAVIX    90 tablet    TAKE 1 TABLET BY MOUTH EVERY DAY    Aorto-iliac atherosclerosis (H)       gabapentin 100 MG capsule    NEURONTIN    60 capsule    Take 1 capsule (100 mg) by mouth 3 times daily    Lumbar disc disorder       metoprolol 25 MG 24 hr tablet    TOPROL-XL    90 tablet    Take 1 tablet (25 mg) by mouth every evening    Coronary atherosclerosis due to lipid rich plaque       rosuvastatin 20 MG tablet    CRESTOR    30 tablet    Take 1 tablet (20 mg) by mouth At Bedtime    Aorto-iliac atherosclerosis (H)       traMADol 50 MG tablet    ULTRAM    30 tablet    Take 1 tablet (50 mg) by mouth every 6 hours as needed for pain    Hip pain, left

## 2017-07-13 NOTE — NURSING NOTE
"Chief Complaint   Patient presents with     Musculoskeletal Problem       Initial /88  Pulse 72  Temp 98  F (36.7  C) (Oral)  Wt 250 lb (113.4 kg)  SpO2 96%  BMI 35.87 kg/m2 Estimated body mass index is 35.87 kg/(m^2) as calculated from the following:    Height as of 4/18/17: 5' 10\" (1.778 m).    Weight as of this encounter: 250 lb (113.4 kg).  Medication Reconciliation: complete     Chelo Pascual MA  "

## 2017-07-13 NOTE — PATIENT INSTRUCTIONS
Ibuprofen- 600 mg every 8 hours for few days with food to help with swelling. Epson salt water- warm water soaks 2-3 times per day. Take full course of antibiotics. Take daily probiotic- and eat yogurt daily. Follow up if symptoms persist or worsen.       Discharge Instructions for Cellulitis  You have been diagnosed with cellulitis. This is an infection in the deepest layer of the skin. In some cases, the infection also affects the muscle. Cellulitis is caused by bacteria. The bacteria can enter the body through broken skin. This can happen with a cut, scratch, animal bite, or an insect bite that has been scratched. You may have been treated in the hospital with antibiotics and fluids. You will likely be given a prescription for antibiotics to take at home. This sheet will help you take care of yourself at home.  Home care  When you are home:    Take the prescribed antibiotic medicine you are given as directed until it is gone. Take it even if you feel better. It treats the infection and stops it from returning. Not taking all the medicine can make future infections hard to treat.    Keep the infected area clean.    When possible, raise the infected area above the level of your heart. This helps keep swelling down.    Talk with your healthcare provider if you are in pain. Ask what kind of over-the-counter medicine you can take for pain.    Apply clean bandages as advised.    Take your temperature once a day for a week.    Wash your hands often to prevent spreading the infection.  In the future, wash your hands before and after you touch cuts, scratches, or bandages. This will help prevent infection.   When to call your healthcare provider  Call your healthcare provider immediately if you have any of the following:    Difficulty or pain when moving the joints above or below the infected area    Discharge or pus draining from the area    Fever of 100.4 F (38 C) or higher, or as directed by your healthcare  provider    Pain that gets worse in or around the infected     Redness that gets worse in or around the infected area, particularly if the area of redness expands to a wider area    Shaking chills    Swelling of the infected area    Vomiting   Date Last Reviewed: 8/1/2016 2000-2017 [employer]. [address] [place] [address]. All rights reserved. This information is not intended as a substitute for professional medical care. Always follow your healthcare professional's instructions.

## 2017-07-13 NOTE — PROGRESS NOTES
SUBJECTIVE:                                                    Santos Rajan is a 62 year old male who presents to clinic today for the following health issues:      Muscle/Joint Pain     Onset: 1.5 weeks    Description:   Location: right lower leg    Progression of Symptoms: worse    Accompanying Signs & Symptoms:  Other symptoms: swelling     Precipitating factors:   Trauma or overuse: YES- fell    Alleviating factors:  Improved by: nothing  Therapies Tried and outcome: ice, advil, ibu; augmentin- improving some. One area of injury d/t fall on ladder, 2 areas pt thinks due to icing leg      Review of CT/ xray shows hematoma d/t injury       Problem list and histories reviewed & adjusted, as indicated.  Additional history: as documented    Patient Active Problem List   Diagnosis     CARDIOVASCULAR SCREENING; LDL GOAL LESS THAN 160     Impaired fasting glucose     Low testosterone     Fatty liver     Umbilical hernia     Colon polyps     Advanced directives, counseling/discussion     Benign essential hypertension     Diverticulosis of large intestine without hemorrhage     Status post coronary angiogram     AAA (abdominal aortic aneurysm) without rupture (H)     Aorto-iliac atherosclerosis (H)     Health Care Home     Past Surgical History:   Procedure Laterality Date     ABDOMEN SURGERY  1998    Gall bladder     CHOLECYSTECTOMY  1998     COLONOSCOPY  2015     DENTAL SURGERY      implants      ENDOVASCULAR REPAIR ANEURYSM ABDOMINAL AORTA N/A 4/18/2017    Procedure: ENDOVASCULAR REPAIR ANEURYSM ABDOMINAL AORTA;  Ultra sound guided left/right femoral arterial and left brachial artery access.  Endovascular Abdomnal Aortic  Aneurysm Repair with Endologix AFX2 Bifurcated Endograft system and MORALES   Proximal Endograft System.  Left common and external iliac artery stents using [employer] Epic Vascular Self-expanding stent system. Express LD iliac/     EYE SURGERY  2013    Lasik     GALLBLADDER SURGERY      gall  bladder removal      ORTHOPEDIC SURGERY  1996    Broken jaw & femur       Social History   Substance Use Topics     Smoking status: Current Some Day Smoker     Packs/day: 0.50     Years: 40.00     Start date: 3/1/1973     Last attempt to quit: 8/1/2015     Smokeless tobacco: Never Used     Alcohol use 0.0 oz/week     0 Standard drinks or equivalent per week      Comment: minimal     Family History   Problem Relation Age of Onset     CANCER Mother      kidney     Other Cancer Mother      kidney     Aneurysm Father      Other Cancer Brother      lieukemia     CANCER Brother      Leukemia      Sleep Apnea Brother      Coronary Artery Disease No family hx of      DIABETES No family hx of          Current Outpatient Prescriptions   Medication Sig Dispense Refill     amoxicillin-clavulanate (AUGMENTIN) 875-125 MG per tablet TK 1 T PO BID  0     cephALEXin (KEFLEX) 500 MG capsule Take 1 capsule (500 mg) by mouth 4 times daily 40 capsule 0     traMADol (ULTRAM) 50 MG tablet Take 1 tablet (50 mg) by mouth every 6 hours as needed for pain 30 tablet 0     rosuvastatin (CRESTOR) 20 MG tablet Take 1 tablet (20 mg) by mouth At Bedtime 30 tablet 3     cilostazol (PLETAL) 50 MG tablet TAKE 1 TABLET(50 MG) BY MOUTH TWICE DAILY 180 tablet 0     clopidogrel (PLAVIX) 75 MG tablet TAKE 1 TABLET BY MOUTH EVERY DAY 90 tablet 0     gabapentin (NEURONTIN) 100 MG capsule Take 1 capsule (100 mg) by mouth 3 times daily 60 capsule 0     amLODIPine-benazepril (LOTREL) 10-40 MG per capsule Take 1 capsule by mouth daily 90 capsule 1     metoprolol (TOPROL-XL) 25 MG 24 hr tablet Take 1 tablet (25 mg) by mouth every evening 90 tablet 3     acetaminophen (TYLENOL) 325 MG tablet Take 2 tablets (650 mg) by mouth every 4 hours as needed for other (surgical pain) 100 tablet 0     aspirin EC 81 MG EC tablet Take 1 tablet (81 mg) by mouth daily (Patient taking differently: Take 81 mg by mouth every evening ) 60 tablet 3     Allergies   Allergen Reactions      Blood Transfusion Related (Informational Only) Other (See Comments)     Patient has a history of a clinically significant antibody against RBC antigens.  A delay in compatible RBCs may occur.     Codeine      Patient reports mom had severe reaction to codeine     BP Readings from Last 3 Encounters:   07/13/17 130/88   07/10/17 130/80   06/09/17 110/70    Wt Readings from Last 3 Encounters:   07/13/17 250 lb (113.4 kg)   06/09/17 244 lb (110.7 kg)   04/18/17 240 lb 4.8 oz (109 kg)             Labs reviewed in EPIC    Reviewed and updated as needed this visit by clinical staff  Tobacco  Allergies  Meds  Med Hx  Surg Hx  Fam Hx  Soc Hx      Reviewed and updated as needed this visit by Provider         ROS:  Constitutional, HEENT, cardiovascular, pulmonary, GI, , musculoskeletal, neuro, skin, endocrine and psych systems are negative, except as otherwise noted.    OBJECTIVE:     /88  Pulse 72  Temp 98  F (36.7  C) (Oral)  Wt 250 lb (113.4 kg)  SpO2 96%  BMI 35.87 kg/m2  Body mass index is 35.87 kg/(m^2).  GENERAL: healthy, alert and no distress  RESP: lungs clear to auscultation - no rales, rhonchi or wheezes  CV: regular rate and rhythm, normal S1 S2, no S3 or S4, no murmur, click or rub, no peripheral edema and peripheral pulses strong  MS: no gross musculoskeletal defects noted, no edema  SKIN: no suspicious lesions or rashes POSITIVE for large area of cellulitis on R shin 3 open areas/ scabbed areas, one with purulent crusted discharge. 2 areas due to ice use per pt.  Discussed proper icing, and wanting to avoid frost bite, follow up if worsening. Large lump to shin- improving per pt, reviewed CT- soft tissue mass with injury consistent with hematoma. Does not feel like an abscess that can be drained/ hard.   PSYCH: mentation appears normal, affect normal/bright    Diagnostic Test Results:  See orders    ASSESSMENT/PLAN:     Spoke to vascular doctor via phone, she stated OK to prescribed  ultram.  Per her script was printed at visit, but pt never got it, unsure where it is. Pt is not a drug seeker/ user.       ICD-10-CM    1. Cellulitis and abscess of leg L03.119 cephALEXin (KEFLEX) 500 MG capsule    L02.419     right shin   2. Other chronic pain G89.29 Drug Abuse Screen Panel 13, Urine (Pain Care Package)   3. Hip pain, left M25.552 traMADol (ULTRAM) 50 MG tablet       See Patient Instructions: Ibuprofen- 600 mg every 8 hours for few days with food to help with swelling. Epson salt water- warm water soaks 2-3 times per day. Take full course of antibiotics. Take daily probiotic- and eat yogurt daily. Follow up if symptoms persist or worsen.     Manda Mcelroy, ADRIANA  [facility]

## 2017-08-03 ENCOUNTER — OFFICE VISIT (OUTPATIENT)
Dept: FAMILY MEDICINE | Facility: CLINIC | Age: 62
End: 2017-08-03
Payer: COMMERCIAL

## 2017-08-03 VITALS
DIASTOLIC BLOOD PRESSURE: 81 MMHG | BODY MASS INDEX: 34.78 KG/M2 | WEIGHT: 242.4 LBS | SYSTOLIC BLOOD PRESSURE: 132 MMHG | HEART RATE: 68 BPM | TEMPERATURE: 97.7 F

## 2017-08-03 DIAGNOSIS — S80.12XD LEG HEMATOMA, LEFT, SUBSEQUENT ENCOUNTER: Primary | ICD-10-CM

## 2017-08-03 PROCEDURE — 99213 OFFICE O/P EST LOW 20 MIN: CPT | Performed by: PHYSICIAN ASSISTANT

## 2017-08-03 NOTE — MR AVS SNAPSHOT
After Visit Summary   8/3/2017    Santos Rajan    MRN: [id number]           Patient Information     Date Of Birth          1955        Visit Information        Provider Department      8/3/2017 1:00 PM Roly Tomas PA-C [facility]        Today's Diagnoses     Leg hematoma, left, subsequent encounter    -  1       Follow-ups after your visit        Who to contact     Normal or non-critical lab and imaging results will be communicated to you by Proberryhart, letter or phone within 4 business days after the clinic has received the results. If you do not hear from us within 7 days, please contact the clinic through Proberryhart or phone. If you have a critical or abnormal lab result, we will notify you by phone as soon as possible.  Submit refill requests through [employer] or call your pharmacy and they will forward the refill request to us. Please allow 3 business days for your refill to be completed.          If you need to speak with a  for additional information , please call: [phone]             Additional Information About Your Visit        [employer]berry[employer] Information     [employer] gives you secure access to your electronic health record. If you see a primary care provider, you can also send messages to your care team and make appointments. If you have questions, please call your primary care clinic.  If you do not have a primary care provider, please call [phone] and they will assist you.        Care EveryWhere ID     This is your Care EveryWhere ID. This could be used by other organizations to access your [facility] medical records  [id number]        Your Vitals Were     Pulse Temperature BMI (Body Mass Index)             68 97.7  F (36.5  C) (Tympanic) 34.78 kg/m2          Blood Pressure from Last 3 Encounters:   08/03/17 132/81   07/13/17 130/88   07/10/17 130/80    Weight from Last 3 Encounters:   08/03/17 242 lb 6.4 oz (110 kg)   07/13/17 250 lb (113.4 kg)    06/09/17 244 lb (110.7 kg)              Today, you had the following     No orders found for display         Today's Medication Changes          These changes are accurate as of: 8/3/17  1:48 PM.  If you have any questions, ask your nurse or doctor.               These medicines have changed or have updated prescriptions.        Dose/Directions    aspirin 81 MG EC tablet   This may have changed:  when to take this   Used for:  Coronary atherosclerosis due to lipid rich plaque        Dose:  81 mg   Take 1 tablet (81 mg) by mouth daily   Quantity:  60 tablet   Refills:  3         Stop taking these medicines if you haven't already. Please contact your care team if you have questions.     amoxicillin-clavulanate 875-125 MG per tablet   Commonly known as:  AUGMENTIN   Stopped by:  Roly Tomas PA-C           cephALEXin 500 MG capsule   Commonly known as:  KEFLEX   Stopped by:  Roly Tomas PA-C                    Primary Care Provider Office Phone # Fax #    Roly Tomas PA-C [phone]       [facility] [address] [facility] [address]        Equal Access to Services     [facility]: Hadii aad ku hadasho Soomaali, waaxda luqadaha, qaybta kaalmada adeegyada, waxay natanaelin hayeddi huynh . So [facility] [phone].    ATENCIÓN: Si habla español, tiene a hernandez disposición servicios gratuitos de asistencia lingüística. Bartolo al [phone].    We comply with applicable federal civil rights laws and [place] laws. We do not discriminate on the basis of race, color, national origin, age, disability sex, sexual orientation or gender identity.            Thank you!     Thank you for choosing [facility]  for your care. Our goal is always to provide you with excellent care. Hearing back from our patients is one way we can continue to improve our services. Please take a few minutes to complete the written survey that you may receive in the mail after your visit  with us. Thank you!             Your Updated Medication List - Protect others around you: Learn how to safely use, store and throw away your medicines at www.disposemymeds.org.          This list is accurate as of: 8/3/17  1:48 PM.  Always use your most recent med list.                   Brand Name Dispense Instructions for use Diagnosis    acetaminophen 325 MG tablet    TYLENOL    100 tablet    Take 2 tablets (650 mg) by mouth every 4 hours as needed for other (surgical pain)    Aorto-iliac atherosclerosis (H)       amLODIPine-benazepril 10-40 MG per capsule    LOTREL    90 capsule    Take 1 capsule by mouth daily    Benign essential hypertension       aspirin 81 MG EC tablet     60 tablet    Take 1 tablet (81 mg) by mouth daily    Coronary atherosclerosis due to lipid rich plaque       cilostazol 50 MG tablet    PLETAL    180 tablet    TAKE 1 TABLET(50 MG) BY MOUTH TWICE DAILY    Aorto-iliac atherosclerosis (H)       clopidogrel 75 MG tablet    PLAVIX    90 tablet    TAKE 1 TABLET BY MOUTH EVERY DAY    Aorto-iliac atherosclerosis (H)       gabapentin 100 MG capsule    NEURONTIN    60 capsule    Take 1 capsule (100 mg) by mouth 3 times daily    Lumbar disc disorder       metoprolol 25 MG 24 hr tablet    TOPROL-XL    90 tablet    Take 1 tablet (25 mg) by mouth every evening    Coronary atherosclerosis due to lipid rich plaque       rosuvastatin 20 MG tablet    CRESTOR    30 tablet    Take 1 tablet (20 mg) by mouth At Bedtime    Aorto-iliac atherosclerosis (H)       traMADol 50 MG tablet    ULTRAM    30 tablet    Take 1 tablet (50 mg) by mouth every 6 hours as needed for pain    Hip pain, left

## 2017-08-03 NOTE — NURSING NOTE
"Chief Complaint   Patient presents with     Musculoskeletal Problem     Right leg injury 1 month ago        Initial /81  Pulse 68  Temp 97.7  F (36.5  C) (Tympanic)  Wt 242 lb 6.4 oz (110 kg)  BMI 34.78 kg/m2 Estimated body mass index is 34.78 kg/(m^2) as calculated from the following:    Height as of 4/18/17: 5' 10\" (1.778 m).    Weight as of this encounter: 242 lb 6.4 oz (110 kg).  Medication Reconciliation: complete       Yasmin Jade CMA      "

## 2017-08-03 NOTE — PROGRESS NOTES
SUBJECTIVE:                                                    Santos Rajan is a 62 year old male who presents to clinic today for the following health issues:      Right leg injury       Duration: x1 month     Description (location/character/radiation): Right calf area     Intensity:  severe    Accompanying signs and symptoms: none     History (similar episodes/previous evaluation): None    Precipitating or alleviating factors: Elevation seems to help with pain     Therapies tried and outcome: Antibiotics, ice, elevation, IBU            Problem list and histories reviewed & adjusted, as indicated.  Additional history: as documented    Patient Active Problem List   Diagnosis     CARDIOVASCULAR SCREENING; LDL GOAL LESS THAN 160     Impaired fasting glucose     Low testosterone     Fatty liver     Umbilical hernia     Colon polyps     Advanced directives, counseling/discussion     Benign essential hypertension     Diverticulosis of large intestine without hemorrhage     Status post coronary angiogram     AAA (abdominal aortic aneurysm) without rupture (H)     Aorto-iliac atherosclerosis (H)     Health Care Home     Past Surgical History:   Procedure Laterality Date     ABDOMEN SURGERY  1998    Gall bladder     CHOLECYSTECTOMY  1998     COLONOSCOPY  2015     DENTAL SURGERY      implants      ENDOVASCULAR REPAIR ANEURYSM ABDOMINAL AORTA N/A 4/18/2017    Procedure: ENDOVASCULAR REPAIR ANEURYSM ABDOMINAL AORTA;  Ultra sound guided left/right femoral arterial and left brachial artery access.  Endovascular Abdomnal Aortic  Aneurysm Repair with Endologix AFX2 Bifurcated Endograft system and MORALES   Proximal Endograft System.  Left common and external iliac artery stents using [employer] Epic Vascular Self-expanding stent system. Express LD iliac/     EYE SURGERY  2013    Lasik     GALLBLADDER SURGERY      gall bladder removal      ORTHOPEDIC SURGERY  1996    Broken jaw & femur       Social History   Substance Use  Topics     Smoking status: Current Some Day Smoker     Packs/day: 0.50     Years: 40.00     Start date: 3/1/1973     Last attempt to quit: 8/1/2015     Smokeless tobacco: Never Used     Alcohol use 0.0 oz/week     0 Standard drinks or equivalent per week      Comment: minimal     Family History   Problem Relation Age of Onset     CANCER Mother      kidney     Other Cancer Mother      kidney     Aneurysm Father      Other Cancer Brother      lieukemia     CANCER Brother      Leukemia      Sleep Apnea Brother      Coronary Artery Disease No family hx of      DIABETES No family hx of          BP Readings from Last 3 Encounters:   08/03/17 132/81   07/13/17 130/88   07/10/17 130/80    Wt Readings from Last 3 Encounters:   08/03/17 242 lb 6.4 oz (110 kg)   07/13/17 250 lb (113.4 kg)   06/09/17 244 lb (110.7 kg)                        Reviewed and updated as needed this visit by clinical staffTobacco  Allergies       Reviewed and updated as needed this visit by Provider         All other systems negative except as outline above  OBJECTIVE:  Moderate sized hematoma right shin/leg. No profound tenderness. No redness. No lymphadenopathy. rom of knee and ankle normal.   CHEST:chest clear to IPPA, no tachypnea, retractions or cyanosis and S1, S2 normal, no murmur, no gallop, rate regular.    Santos was seen today for musculoskeletal problem.    Diagnoses and all orders for this visit:    Leg hematoma, left, subsequent encounter      Discussed treatment options . Patient would like to just give it time to resolve at this point.

## 2017-09-05 DIAGNOSIS — I70.0 AORTO-ILIAC ATHEROSCLEROSIS (H): ICD-10-CM

## 2017-09-05 DIAGNOSIS — I70.8 AORTO-ILIAC ATHEROSCLEROSIS (H): ICD-10-CM

## 2017-09-05 NOTE — TELEPHONE ENCOUNTER
Clopidgrel           Last Written Prescription Date: 06/10/17  Last Fill Quantity: 90, # refills: 0    [place] [facility] [place] [facility] prescribing provider:  08/03/17   Future Office Visit:       Lab Results   Component Value Date    WBC 13.3 04/20/2017     Lab Results   Component Value Date    RBC 4.11 04/20/2017     Lab Results   Component Value Date    HGB 12.6 04/20/2017     Lab Results   Component Value Date    HCT 40.2 04/20/2017     No components found for: MCT  Lab Results   Component Value Date    MCV 98 04/20/2017     Lab Results   Component Value Date    MCH 30.7 04/20/2017     Lab Results   Component Value Date    MCHC 31.3 04/20/2017     Lab Results   Component Value Date    RDW 14.1 04/20/2017     Lab Results   Component Value Date     04/20/2017     Lab Results   Component Value Date    AST 21 04/18/2017     Lab Results   Component Value Date    ALT 19 04/18/2017     Creatinine   Date Value Ref Range Status   04/20/2017 0.61 (L) 0.66 - 1.25 mg/dL Final   ]

## 2017-09-06 RX ORDER — CLOPIDOGREL BISULFATE 75 MG/1
75 TABLET ORAL DAILY
Qty: 90 TABLET | Refills: 0 | Status: SHIPPED | OUTPATIENT
Start: 2017-09-06 | End: 2017-11-03

## 2017-09-26 DIAGNOSIS — I10 BENIGN ESSENTIAL HYPERTENSION: ICD-10-CM

## 2017-09-26 NOTE — TELEPHONE ENCOUNTER
AMLODIPINE-BENZA 90 DAY SUPPLY      Last Written Prescription Date: ?  Last Fill Quantity: ?, # refills: 1  Last [facility] or [facility] prescribing provider: 8-3-17       Potassium   Date Value Ref Range Status   04/20/2017 3.7 3.4 - 5.3 mmol/L Final     Creatinine   Date Value Ref Range Status   04/20/2017 0.61 (L) 0.66 - 1.25 mg/dL Final     BP Readings from Last 3 Encounters:   08/03/17 132/81   07/13/17 130/88   07/10/17 130/80

## 2017-09-26 NOTE — TELEPHONE ENCOUNTER
Routing refill request to provider for review/approval because:  Labs out of range: creatinine

## 2017-09-27 RX ORDER — AMLODIPINE AND BENAZEPRIL HYDROCHLORIDE 10; 40 MG/1; MG/1
1 CAPSULE ORAL DAILY
Qty: 90 CAPSULE | Refills: 1 | Status: SHIPPED | OUTPATIENT
Start: 2017-09-27 | End: 2017-11-03

## 2017-10-16 ENCOUNTER — TELEPHONE (OUTPATIENT)
Dept: FAMILY MEDICINE | Facility: CLINIC | Age: 62
End: 2017-10-16

## 2017-10-16 NOTE — TELEPHONE ENCOUNTER
Panel Management Review      Patient has the following on his problem list:       IVD      Last LDL:    Lab Results   Component Value Date    CHOL 142 06/14/2016     Lab Results   Component Value Date    HDL 36 06/14/2016     Lab Results   Component Value Date    LDL 84 06/14/2016     Lab Results   Component Value Date    TRIG 110 06/14/2016        Lab Results   Component Value Date    CHOLHDLRATIO 4.4 08/10/2015                          Medications     HMG CoA Reductase Inhibitors    rosuvastatin (CRESTOR) 20 MG tablet    Salicylates    aspirin EC 81 MG EC tablet          Last three blood pressure readings:  BP Readings from Last 3 Encounters:   08/03/17 132/81   07/13/17 130/88   07/10/17 130/80        Tobacco History:     History   Smoking Status     Current Some Day Smoker     Packs/day: 0.50     Years: 40.00     Start date: 3/1/1973     Last attempt to quit: 8/1/2015   Smokeless Tobacco     Never Used           Composite cancer screening  Chart review shows that this patient is due/due soon for the following None  Summary:    Patient is due/failing the following:   Tobacco cessation-ivd fail due to patient smoking  Type of outreach:    none-added to HM to discuss at next visit    Questions for provider review:    None                                                                                                                                    Chelo Pascual MA       Chart routed to Care Team .

## 2017-11-03 ENCOUNTER — OFFICE VISIT (OUTPATIENT)
Dept: FAMILY MEDICINE | Facility: CLINIC | Age: 62
End: 2017-11-03
Payer: COMMERCIAL

## 2017-11-03 VITALS
DIASTOLIC BLOOD PRESSURE: 76 MMHG | HEIGHT: 70 IN | OXYGEN SATURATION: 97 % | TEMPERATURE: 97.4 F | SYSTOLIC BLOOD PRESSURE: 115 MMHG | BODY MASS INDEX: 35.42 KG/M2 | HEART RATE: 77 BPM | WEIGHT: 247.4 LBS

## 2017-11-03 DIAGNOSIS — F17.200 TOBACCO USE DISORDER: ICD-10-CM

## 2017-11-03 DIAGNOSIS — I10 BENIGN ESSENTIAL HYPERTENSION: ICD-10-CM

## 2017-11-03 DIAGNOSIS — I25.83 CORONARY ATHEROSCLEROSIS DUE TO LIPID RICH PLAQUE: ICD-10-CM

## 2017-11-03 DIAGNOSIS — Z23 IMMUNIZATION DUE: ICD-10-CM

## 2017-11-03 DIAGNOSIS — I70.8 AORTO-ILIAC ATHEROSCLEROSIS (H): ICD-10-CM

## 2017-11-03 DIAGNOSIS — M25.552 HIP PAIN, LEFT: ICD-10-CM

## 2017-11-03 DIAGNOSIS — J20.9 ACUTE BRONCHITIS, UNSPECIFIED ORGANISM: Primary | ICD-10-CM

## 2017-11-03 DIAGNOSIS — I70.0 AORTO-ILIAC ATHEROSCLEROSIS (H): ICD-10-CM

## 2017-11-03 PROCEDURE — 90670 PCV13 VACCINE IM: CPT | Performed by: PHYSICIAN ASSISTANT

## 2017-11-03 PROCEDURE — 99214 OFFICE O/P EST MOD 30 MIN: CPT | Mod: 25 | Performed by: PHYSICIAN ASSISTANT

## 2017-11-03 PROCEDURE — 90471 IMMUNIZATION ADMIN: CPT | Performed by: PHYSICIAN ASSISTANT

## 2017-11-03 RX ORDER — ALBUTEROL SULFATE 90 UG/1
2 AEROSOL, METERED RESPIRATORY (INHALATION) EVERY 6 HOURS PRN
Qty: 1 INHALER | Refills: 1 | Status: SHIPPED | OUTPATIENT
Start: 2017-11-03 | End: 2018-06-05

## 2017-11-03 RX ORDER — AZITHROMYCIN 250 MG/1
TABLET, FILM COATED ORAL
Qty: 6 TABLET | Refills: 0 | Status: SHIPPED | OUTPATIENT
Start: 2017-11-03 | End: 2018-06-05

## 2017-11-03 RX ORDER — ROSUVASTATIN CALCIUM 20 MG/1
20 TABLET, COATED ORAL DAILY
Qty: 90 TABLET | Refills: 3 | Status: SHIPPED | OUTPATIENT
Start: 2017-11-03 | End: 2018-06-05

## 2017-11-03 RX ORDER — ROSUVASTATIN CALCIUM 20 MG/1
20 TABLET, COATED ORAL AT BEDTIME
Qty: 30 TABLET | Refills: 3 | Status: SHIPPED | OUTPATIENT
Start: 2017-11-03 | End: 2017-11-03

## 2017-11-03 RX ORDER — METOPROLOL SUCCINATE 25 MG/1
25 TABLET, EXTENDED RELEASE ORAL EVERY EVENING
Qty: 90 TABLET | Refills: 3 | Status: SHIPPED | OUTPATIENT
Start: 2017-11-03 | End: 2018-06-05

## 2017-11-03 RX ORDER — CLOPIDOGREL BISULFATE 75 MG/1
75 TABLET ORAL DAILY
Qty: 90 TABLET | Refills: 0 | Status: SHIPPED | OUTPATIENT
Start: 2017-11-03 | End: 2018-05-03

## 2017-11-03 RX ORDER — VARENICLINE TARTRATE 1 MG/1
1 TABLET, FILM COATED ORAL 2 TIMES DAILY
Qty: 56 TABLET | Refills: 2 | Status: SHIPPED | OUTPATIENT
Start: 2017-11-03 | End: 2018-06-05

## 2017-11-03 RX ORDER — PREDNISONE 20 MG/1
40 TABLET ORAL DAILY
Qty: 10 TABLET | Refills: 0 | Status: SHIPPED | OUTPATIENT
Start: 2017-11-03 | End: 2017-11-08

## 2017-11-03 RX ORDER — AMLODIPINE AND BENAZEPRIL HYDROCHLORIDE 10; 40 MG/1; MG/1
1 CAPSULE ORAL DAILY
Qty: 90 CAPSULE | Refills: 1 | Status: SHIPPED | OUTPATIENT
Start: 2017-11-03 | End: 2018-06-05

## 2017-11-03 RX ORDER — TRAMADOL HYDROCHLORIDE 50 MG/1
50 TABLET ORAL EVERY 6 HOURS PRN
Qty: 30 TABLET | Refills: 0 | Status: SHIPPED | OUTPATIENT
Start: 2017-11-03 | End: 2018-06-05

## 2017-11-03 RX ORDER — CILOSTAZOL 50 MG/1
TABLET ORAL
Qty: 180 TABLET | Refills: 0 | Status: SHIPPED | OUTPATIENT
Start: 2017-11-03 | End: 2018-06-05

## 2017-11-03 NOTE — PROGRESS NOTES
SUBJECTIVE:  Santos Rajan is a 62 year old male who presents with the following concerns;              Symptoms: cc Present Absent Comment   Fever/Chills   x    Fatigue  x     Muscle Aches   x    Eye Irritation   x    Sneezing   x    Nasal Saurabh/Drg  x  Nasal drainage   Sinus Pressure/Pain   x    Loss of smell   x    Dental pain   x    Sore Throat   x    Swollen Glands   x    Ear Pain/Fullness   x    Cough  x  Coughing up green   Wheeze  x  A little   Chest Pain  x  Some chest tightness   Shortness of breath  x     Rash   x    Other  x  Diarrhea over the past x3 days     Symptom duration:  x3 weeks   Sympom severity:  no progress of symptoms   Treatments tried:  cough and cold medicine, mucin ex    Contacts:  was in [place] x3 weeks ago- thinks he caught something while on plane home        Additional Concerns:    Would like to discuss refills on medications and medication dosage- reporting easily bruising.    Also looking to discuss starting chant ax, has never tried previously.      Medications updated and reviewed.  Past, family and surgical history is updated and reviewed in the record.  Patient Active Problem List    Diagnosis Date Noted     Health Care Home 05/18/2017     Priority: Medium     *See Letters for [facility] Care Plan: My Access Plan         AAA (abdominal aortic aneurysm) without rupture (H) 04/18/2017     Priority: Medium     Aorto-iliac atherosclerosis (H) 04/18/2017     Priority: Medium     Status post coronary angiogram 03/21/2017     Priority: Medium     Diverticulosis of large intestine without hemorrhage 07/23/2016     Priority: Medium     Benign essential hypertension 06/14/2016     Priority: Medium     Advanced directives, counseling/discussion 09/17/2015     Priority: Medium     Advance Care Planning 9/17/2015: ACP Review and Resources Provided:  Reviewed chart for advance care plan.  Santos Rajan has no plan or code status on file.  available resources provided with information.  code status  current choice pending further ACP discussions.   Added by CHARLA DOUGLAS           Impaired fasting glucose 09/01/2015     Priority: Medium     Low testosterone 09/01/2015     Priority: Medium     Fatty liver 09/01/2015     Priority: Medium     Umbilical hernia 09/01/2015     Priority: Medium     Colon polyps 09/01/2015     Priority: Medium     On Colonoscopy 11/9/2011 - [facility]       CARDIOVASCULAR SCREENING; LDL GOAL LESS THAN 160 08/06/2015     Priority: Medium     Past Medical History:   Diagnosis Date     AAA (abdominal aortic aneurysm) (H)      Aortoiliac occlusive disease (H)      Arthritis 1995    knee and hands     Benign essential hypertension 6/14/2016     Brain tumor (benign) (H)     Being monitored, Lizbeth      Diverticula of colon     No surgery      Red blood cell antibody positive     Anti-Nissa     Umbilical hernia 9/1/2015      Family History   Problem Relation Age of Onset     CANCER Mother      kidney     Other Cancer Mother      kidney     Aneurysm Father      Other Cancer Brother      lieukemia     CANCER Brother      Leukemia      Sleep Apnea Brother      Coronary Artery Disease No family hx of      DIABETES No family hx of      ROS:  Other than noted above, general, HEENT, respiratory, cardiac and gastrointestinal systems are negative.  OBJECTIVE:  ENT exam reveals - bilateral TM fluid noted, neck without nodes, throat normal without erythema or exudate, sinuses nontender, post nasal drip noted, nasal mucosa congested and nasal mucosa pale and congested.  CHEST:no tachypnea, retractions or cyanosis, mild inspiratory wheezing heard diffusely throughout both lungs, mild expiratory wheezing heard diffusely throughout both lungs and S1, S2 normal, no murmur, no gallop, rate regular.      Santos was seen today for uri.    Diagnoses and all orders for this visit:    Acute bronchitis, unspecified organism  -     azithromycin (ZITHROMAX) 250 MG tablet; Two tablets first day, then one  tablet daily for four days.  -     albuterol (PROAIR HFA/PROVENTIL HFA/VENTOLIN HFA) 108 (90 BASE) MCG/ACT Inhaler; Inhale 2 puffs into the lungs every 6 hours as needed for shortness of breath / dyspnea or wheezing  -     predniSONE (DELTASONE) 20 MG tablet; Take 2 tablets (40 mg) by mouth daily for 5 days    Benign essential hypertension  -     amLODIPine-benazepril (LOTREL) 10-40 MG per capsule; Take 1 capsule by mouth daily    Aorto-iliac atherosclerosis (H)  -     clopidogrel (PLAVIX) 75 MG tablet; Take 1 tablet (75 mg) by mouth daily  -     Discontinue: rosuvastatin (CRESTOR) 20 MG tablet; Take 1 tablet (20 mg) by mouth At Bedtime  -     cilostazol (PLETAL) 50 MG tablet; TAKE 1 TABLET(50 MG) BY MOUTH TWICE DAILY    Coronary atherosclerosis due to lipid rich plaque  -     metoprolol (TOPROL-XL) 25 MG 24 hr tablet; Take 1 tablet (25 mg) by mouth every evening  -     rosuvastatin (CRESTOR) 20 MG tablet; Take 1 tablet (20 mg) by mouth daily    Hip pain, left  -     traMADol (ULTRAM) 50 MG tablet; Take 1 tablet (50 mg) by mouth every 6 hours as needed for pain    Immunization due  -     PNEUMOCOCCAL CONJ VACCINE 13 VALENT IM    Tobacco use disorder  -     varenicline (CHANTIX) 1 MG tablet; Take 1 tablet (1 mg) by mouth 2 times daily  -     varenicline (CHANTIX STARTING MONTH PAK) 0.5 MG X 11 & 1 MG X 42 tablet; Take 0.5 mg tab daily for 3 days, then 0.5 mg tab twice daily for 4 days, then 1 mg twice daily.      work on lifestyle modification  Recheck in 6 mos

## 2018-05-03 DIAGNOSIS — I70.8 AORTO-ILIAC ATHEROSCLEROSIS (H): ICD-10-CM

## 2018-05-03 DIAGNOSIS — I70.0 AORTO-ILIAC ATHEROSCLEROSIS (H): ICD-10-CM

## 2018-05-04 RX ORDER — CLOPIDOGREL BISULFATE 75 MG/1
TABLET ORAL
Qty: 30 TABLET | Refills: 0 | Status: SHIPPED | OUTPATIENT
Start: 2018-05-04 | End: 2018-06-05

## 2018-05-04 NOTE — TELEPHONE ENCOUNTER
"Requested Prescriptions   Pending Prescriptions Disp Refills     clopidogrel (PLAVIX) 75 MG tablet [Pharmacy Med Name: CLOPIDOGREL 75MG TABLETS] 90 tablet 0    Last Written Prescription Date:  11/13/17  Last Fill Quantity: 90,  # refills: 0   Last office visit: 11/3/2017 with prescribing provider:  11/3/17  FILIBERTO Tomas  Future Office Visit:   Sig: TAKE 1 TABLET(75 MG) BY MOUTH DAILY    Plavix Failed    5/3/2018  9:34 PM       Failed - Normal HGB on file in past 12 months    Recent Labs   Lab Test  04/20/17   0753   HGB  12.6*              Failed - Normal Platelets on file in past 12 months    Recent Labs   Lab Test  04/20/17   0753   PLT  146*              Passed - No active PPI on record unless is Protonix       Passed - Recent (12 mo) or future (30 days) visit within the authorizing provider's specialty    Patient had office visit in the last 12 months or has a visit in the next 30 days with authorizing provider or within the authorizing provider's specialty.  See \"Patient Info\" tab in inbasket, or \"Choose Columns\" in Meds & Orders section of the refill encounter.           Passed - Patient is age 18 or older        "

## 2018-05-19 ENCOUNTER — HEALTH MAINTENANCE LETTER (OUTPATIENT)
Age: 63
End: 2018-05-19

## 2018-06-05 ENCOUNTER — OFFICE VISIT (OUTPATIENT)
Dept: FAMILY MEDICINE | Facility: CLINIC | Age: 63
End: 2018-06-05
Payer: COMMERCIAL

## 2018-06-05 ENCOUNTER — RADIANT APPOINTMENT (OUTPATIENT)
Dept: GENERAL RADIOLOGY | Facility: CLINIC | Age: 63
End: 2018-06-05
Attending: PHYSICIAN ASSISTANT
Payer: COMMERCIAL

## 2018-06-05 VITALS
HEART RATE: 66 BPM | OXYGEN SATURATION: 96 % | SYSTOLIC BLOOD PRESSURE: 128 MMHG | WEIGHT: 256 LBS | RESPIRATION RATE: 18 BRPM | BODY MASS INDEX: 36.65 KG/M2 | DIASTOLIC BLOOD PRESSURE: 85 MMHG | TEMPERATURE: 98.4 F | HEIGHT: 70 IN

## 2018-06-05 DIAGNOSIS — F17.200 TOBACCO USE DISORDER: ICD-10-CM

## 2018-06-05 DIAGNOSIS — I10 BENIGN ESSENTIAL HYPERTENSION: Primary | ICD-10-CM

## 2018-06-05 DIAGNOSIS — Z12.11 SPECIAL SCREENING FOR MALIGNANT NEOPLASMS, COLON: ICD-10-CM

## 2018-06-05 DIAGNOSIS — R73.01 IMPAIRED FASTING GLUCOSE: ICD-10-CM

## 2018-06-05 DIAGNOSIS — I25.83 CORONARY ATHEROSCLEROSIS DUE TO LIPID RICH PLAQUE: ICD-10-CM

## 2018-06-05 DIAGNOSIS — I70.0 AORTO-ILIAC ATHEROSCLEROSIS (H): ICD-10-CM

## 2018-06-05 DIAGNOSIS — R06.83 SNORING: ICD-10-CM

## 2018-06-05 DIAGNOSIS — R53.82 CHRONIC FATIGUE: ICD-10-CM

## 2018-06-05 DIAGNOSIS — I70.8 AORTO-ILIAC ATHEROSCLEROSIS (H): ICD-10-CM

## 2018-06-05 DIAGNOSIS — R06.09 DYSPNEA ON EXERTION: ICD-10-CM

## 2018-06-05 DIAGNOSIS — J44.9 CHRONIC OBSTRUCTIVE PULMONARY DISEASE, UNSPECIFIED COPD TYPE (H): ICD-10-CM

## 2018-06-05 LAB
ALBUMIN SERPL-MCNC: 3.6 G/DL (ref 3.4–5)
ALP SERPL-CCNC: 81 U/L (ref 40–150)
ALT SERPL W P-5'-P-CCNC: 33 U/L (ref 0–70)
ANION GAP SERPL CALCULATED.3IONS-SCNC: 6 MMOL/L (ref 3–14)
AST SERPL W P-5'-P-CCNC: 18 U/L (ref 0–45)
BASOPHILS # BLD AUTO: 0 10E9/L (ref 0–0.2)
BASOPHILS NFR BLD AUTO: 0.2 %
BILIRUB SERPL-MCNC: 0.5 MG/DL (ref 0.2–1.3)
BUN SERPL-MCNC: 17 MG/DL (ref 7–30)
CALCIUM SERPL-MCNC: 9.3 MG/DL (ref 8.5–10.1)
CHLORIDE SERPL-SCNC: 106 MMOL/L (ref 94–109)
CO2 SERPL-SCNC: 32 MMOL/L (ref 20–32)
CREAT SERPL-MCNC: 0.82 MG/DL (ref 0.66–1.25)
DIFFERENTIAL METHOD BLD: ABNORMAL
EOSINOPHIL # BLD AUTO: 0.3 10E9/L (ref 0–0.7)
EOSINOPHIL NFR BLD AUTO: 2.7 %
ERYTHROCYTE [DISTWIDTH] IN BLOOD BY AUTOMATED COUNT: 14.3 % (ref 10–15)
FEF 25/75: NORMAL
FEV-1: NORMAL
FEV1/FVC: NORMAL
FVC: NORMAL
GFR SERPL CREATININE-BSD FRML MDRD: >90 ML/MIN/1.7M2
GLUCOSE SERPL-MCNC: 102 MG/DL (ref 70–99)
HBA1C MFR BLD: 6.1 % (ref 0–5.6)
HCT VFR BLD AUTO: 53.7 % (ref 40–53)
HGB BLD-MCNC: 16.9 G/DL (ref 13.3–17.7)
LYMPHOCYTES # BLD AUTO: 2.7 10E9/L (ref 0.8–5.3)
LYMPHOCYTES NFR BLD AUTO: 26.8 %
MCH RBC QN AUTO: 30.8 PG (ref 26.5–33)
MCHC RBC AUTO-ENTMCNC: 31.5 G/DL (ref 31.5–36.5)
MCV RBC AUTO: 98 FL (ref 78–100)
MONOCYTES # BLD AUTO: 1 10E9/L (ref 0–1.3)
MONOCYTES NFR BLD AUTO: 10.1 %
NEUTROPHILS # BLD AUTO: 6 10E9/L (ref 1.6–8.3)
NEUTROPHILS NFR BLD AUTO: 60.2 %
NT-PROBNP SERPL-MCNC: 373 PG/ML (ref 0–125)
PLATELET # BLD AUTO: 171 10E9/L (ref 150–450)
POTASSIUM SERPL-SCNC: 4.7 MMOL/L (ref 3.4–5.3)
PROT SERPL-MCNC: 7.7 G/DL (ref 6.8–8.8)
RBC # BLD AUTO: 5.48 10E12/L (ref 4.4–5.9)
SODIUM SERPL-SCNC: 144 MMOL/L (ref 133–144)
WBC # BLD AUTO: 9.9 10E9/L (ref 4–11)

## 2018-06-05 PROCEDURE — 83880 ASSAY OF NATRIURETIC PEPTIDE: CPT | Performed by: PHYSICIAN ASSISTANT

## 2018-06-05 PROCEDURE — 71046 X-RAY EXAM CHEST 2 VIEWS: CPT | Mod: FY

## 2018-06-05 PROCEDURE — 99214 OFFICE O/P EST MOD 30 MIN: CPT | Mod: 25 | Performed by: PHYSICIAN ASSISTANT

## 2018-06-05 PROCEDURE — 80053 COMPREHEN METABOLIC PANEL: CPT | Performed by: PHYSICIAN ASSISTANT

## 2018-06-05 PROCEDURE — 85025 COMPLETE CBC W/AUTO DIFF WBC: CPT | Performed by: PHYSICIAN ASSISTANT

## 2018-06-05 PROCEDURE — 83036 HEMOGLOBIN GLYCOSYLATED A1C: CPT | Performed by: PHYSICIAN ASSISTANT

## 2018-06-05 PROCEDURE — 94010 BREATHING CAPACITY TEST: CPT | Performed by: PHYSICIAN ASSISTANT

## 2018-06-05 PROCEDURE — 36415 COLL VENOUS BLD VENIPUNCTURE: CPT | Performed by: PHYSICIAN ASSISTANT

## 2018-06-05 RX ORDER — METOPROLOL SUCCINATE 25 MG/1
25 TABLET, EXTENDED RELEASE ORAL EVERY EVENING
Qty: 90 TABLET | Refills: 3 | Status: SHIPPED | OUTPATIENT
Start: 2018-06-05

## 2018-06-05 RX ORDER — ROSUVASTATIN CALCIUM 20 MG/1
20 TABLET, COATED ORAL DAILY
Qty: 90 TABLET | Refills: 3 | Status: SHIPPED | OUTPATIENT
Start: 2018-06-05 | End: 2019-02-19

## 2018-06-05 RX ORDER — VARENICLINE TARTRATE 1 MG/1
1 TABLET, FILM COATED ORAL 2 TIMES DAILY
Qty: 56 TABLET | Refills: 2 | Status: SHIPPED | OUTPATIENT
Start: 2018-06-05 | End: 2019-01-17

## 2018-06-05 RX ORDER — CLOPIDOGREL BISULFATE 75 MG/1
TABLET ORAL
Qty: 90 TABLET | Refills: 1 | Status: SHIPPED | OUTPATIENT
Start: 2018-06-05 | End: 2019-01-02

## 2018-06-05 RX ORDER — CILOSTAZOL 50 MG/1
TABLET ORAL
Qty: 180 TABLET | Refills: 1 | Status: SHIPPED | OUTPATIENT
Start: 2018-06-05 | End: 2019-01-17

## 2018-06-05 RX ORDER — BUDESONIDE AND FORMOTEROL FUMARATE DIHYDRATE 80; 4.5 UG/1; UG/1
2 AEROSOL RESPIRATORY (INHALATION) 2 TIMES DAILY
Qty: 1 INHALER | Refills: 3 | Status: SHIPPED | OUTPATIENT
Start: 2018-06-05 | End: 2019-01-17

## 2018-06-05 RX ORDER — PREDNISONE 20 MG/1
20 TABLET ORAL 2 TIMES DAILY
Qty: 10 TABLET | Refills: 0 | Status: SHIPPED | OUTPATIENT
Start: 2018-06-05 | End: 2018-08-02

## 2018-06-05 RX ORDER — AMLODIPINE AND BENAZEPRIL HYDROCHLORIDE 10; 40 MG/1; MG/1
1 CAPSULE ORAL DAILY
Qty: 90 CAPSULE | Refills: 1 | Status: SHIPPED | OUTPATIENT
Start: 2018-06-05 | End: 2019-01-02

## 2018-06-05 RX ORDER — ALBUTEROL SULFATE 90 UG/1
2 AEROSOL, METERED RESPIRATORY (INHALATION) EVERY 6 HOURS PRN
Qty: 1 INHALER | Refills: 1 | Status: SHIPPED | OUTPATIENT
Start: 2018-06-05 | End: 2019-01-17

## 2018-06-05 NOTE — PROGRESS NOTES
SUBJECTIVE:   Santos Rajan is a 62 year old male who presents to clinic today for the following health issues:      Hyperlipidemia Follow-Up      Rate your low fat/cholesterol diet?: not monitoring fat    Taking statin?  Yes, no muscle aches from statin    Other lipid medications/supplements?:  none    Hypertension Follow-up      Outpatient blood pressures are not being checked.    Low Salt Diet: not monitoring salt      Amount of exercise or physical activity: walks 3 times weekly    Problems taking medications regularly: No    Medication side effects: none    Diet: regular (no restrictions)      Constipation     Onset: 3+ months    Description:   Frequency of bowel movements: 2-3 days.  Stool consistency: hard    Progression of Symptoms:  constant    Accompanying Signs & Symptoms:  Abdominal pain (cramping?): YES- infrequent  Blood in stool: no   Rectal pain: no   Nausea/vomiting: no   Weight loss or gain: no    History:   History of abdominal surgery: no     Precipitating factors:   Recent use of narcotics, anticholinergics, calcium channel blockers, antacids, or iron supplements: no   Chronic Laxative Use: YES         Therapies Tried and outcome: laxatives    Some wheezing.  Some mild leg edema.  No chest pain. No palpitations .  Mild congestion. No orthopnea or pnd.   Still smoking.  Chronic fatigue-no improvement. Possible sleep study referral for chronic fatigue issues. Referral placed    Problem list and histories reviewed & adjusted, as indicated.  Additional history: as documented    BP Readings from Last 3 Encounters:   06/05/18 128/85   11/03/17 115/76   08/03/17 132/81    Wt Readings from Last 3 Encounters:   06/05/18 256 lb (116.1 kg)   11/03/17 247 lb 6.4 oz (112.2 kg)   08/03/17 242 lb 6.4 oz (110 kg)                    Reviewed and updated as needed this visit by clinical staff  Tobacco  Allergies  Meds  Problems  Med Hx  Surg Hx  Fam Hx  Soc Hx        Reviewed and updated as needed this  visit by Provider  Tobacco  Allergies  Meds  Problems  Med Hx  Surg Hx  Fam Hx  Soc Hx          All other systems negative except as outline above  OBJECTIVE:    Eye exam - right eye normal lid, conjunctiva, cornea, pupil and fundus, left eye normal lid, conjunctiva, cornea, pupil and fundus.  ENT exam reveals - ENT exam normal, no neck nodes or sinus tenderness.  CHEST:no tachypnea, retractions or cyanosis, air entry reduced diffusely throughout both lungs and S1, S2 normal, no murmur, no gallop, rate regular.  Mild leg edema      Santos was seen today for hypertension, breathing problem, constipation and fatigue.    Diagnoses and all orders for this visit:    Benign essential hypertension  -     Comprehensive metabolic panel (BMP + Alb, Alk Phos, ALT, AST, Total. Bili, TP)  -     amLODIPine-benazepril (LOTREL) 10-40 MG per capsule; Take 1 capsule by mouth daily    Special screening for malignant neoplasms, colon  -     GASTROENTEROLOGY ADULT REF PROCEDURE ONLY [facility] [phone]; No Provider Preference    Aorto-iliac atherosclerosis (H)  -     cilostazol (PLETAL) 50 MG tablet; TAKE 1 TABLET(50 MG) BY MOUTH TWICE DAILY  -     clopidogrel (PLAVIX) 75 MG tablet; TAKE 1 TABLET(75 MG) BY MOUTH DAILY    Coronary atherosclerosis due to lipid rich plaque  -     metoprolol succinate (TOPROL-XL) 25 MG 24 hr tablet; Take 1 tablet (25 mg) by mouth every evening  -     rosuvastatin (CRESTOR) 20 MG tablet; Take 1 tablet (20 mg) by mouth daily    Tobacco use disorder  -     varenicline (CHANTIX STARTING MONTH PAK) 0.5 MG X 11 & 1 MG X 42 tablet; Take 0.5 mg tab daily for 3 days, then 0.5 mg tab twice daily for 4 days, then 1 mg twice daily.  -     varenicline (CHANTIX) 1 MG tablet; Take 1 tablet (1 mg) by mouth 2 times daily    Impaired fasting glucose  -     Hemoglobin A1c    Chronic fatigue  -     SLEEP EVALUATION & MANAGEMENT REFERRAL - ADULT -[facility] [place]  [phone] (Age 15  and up); Future    Snoring  -     SLEEP EVALUATION & MANAGEMENT REFERRAL - ADULT -[facility] - [place]  [phone] (Age 15 and up); Future    Dyspnea on exertion  -     CBC with platelets differential  -     BNP-N terminal pro  -     XR Chest 2 Views    Chronic obstructive pulmonary disease, unspecified COPD type (H)  -     Spirometry, Breathing Capacity: Normal Order, Clinic Performed  -     budesonide-formoterol (SYMBICORT) 80-4.5 MCG/ACT Inhaler; Inhale 2 puffs into the lungs 2 times daily  -     albuterol (PROAIR HFA/PROVENTIL HFA/VENTOLIN HFA) 108 (90 Base) MCG/ACT Inhaler; Inhale 2 puffs into the lungs every 6 hours as needed for shortness of breath / dyspnea or wheezing  -     predniSONE (DELTASONE) 20 MG tablet; Take 1 tablet (20 mg) by mouth 2 times daily      Advised supportive and symptomatic treatment.  Follow up with Provider - if condition persists or worsens.   If bnp elevated will obtain and echo.

## 2018-06-05 NOTE — MR AVS SNAPSHOT
After Visit Summary   6/5/2018    Santos Rajan    MRN: [id number]           Patient Information     Date Of Birth          1955        Visit Information        Provider Department      6/5/2018 1:20 PM Roly Tomas PA-C [facility]        Today's Diagnoses     Benign essential hypertension    -  1    Special screening for malignant neoplasms, colon        Aorto-iliac atherosclerosis (H)        Coronary atherosclerosis due to lipid rich plaque        Tobacco use disorder        Impaired fasting glucose        Chronic fatigue        Snoring        Dyspnea on exertion        Chronic obstructive pulmonary disease, unspecified COPD type (H)           Follow-ups after your visit        Additional Services     GASTROENTEROLOGY ADULT REF PROCEDURE ONLY [facility] [phone]; No Provider Preference       Last Lab Result: Creatinine (mg/dL)       Date                     Value                 04/20/2017               0.61 (L)         ----------  Body mass index is 36.73 kg/(m^2).     Needed:  No  Language:  English    Patient will be contacted to schedule procedure.     Please be aware that coverage of these services is subject to the terms and limitations of your health insurance plan.  Call member services at your health plan with any benefit or coverage questions.  Any procedures must be performed at a [facility] facility OR coordinated by your clinic's referral office.    Please bring the following with you to your appointment:    (1) Any X-Rays, CTs or MRIs which have been performed.  Contact the facility where they were done to arrange for  prior to your scheduled appointment.    (2) List of current medications   (3) This referral request   (4) Any documents/labs given to you for this referral            SLEEP EVALUATION & MANAGEMENT REFERRAL - ADULT -[facility] - [facility]  [phone] (Age 15 and up)       Please be aware that coverage of  these services is subject to the terms and limitations of your health insurance plan.  Call member services at your health plan with any benefit or coverage questions.      Please bring the following to your appointment:    >>   List of current medications   >>   This referral request   >>   Any documents/labs given to you for this referral                      Future tests that were ordered for you today     Open Future Orders        Priority Expected Expires Ordered    SLEEP EVALUATION & MANAGEMENT REFERRAL - ADULT -[facility] [place]  [phone] (Age 15 and up) Routine  6/5/2019 6/5/2018            Who to contact     Normal or non-critical lab and imaging results will be communicated to you by Vuzithart, letter or phone within 4 business days after the clinic has received the results. If you do not hear from us within 7 days, please contact the clinic through SpokenLayert or phone. If you have a critical or abnormal lab result, we will notify you by phone as soon as possible.  Submit refill requests through TV Volume Wizard App or call your pharmacy and they will forward the refill request to us. Please allow 3 business days for your refill to be completed.          If you need to speak with a  for additional information , please call: [phone]             Additional Information About Your Visit        TV Volume Wizard App Information     TV Volume Wizard App gives you secure access to your electronic health record. If you see a primary care provider, you can also send messages to your care team and make appointments. If you have questions, please call your primary care clinic.  If you do not have a primary care provider, please call [phone] and they will assist you.        Care EveryWhere ID     This is your Care EveryWhere ID. This could be used by other organizations to access your [facility] medical records  [id number]        Your Vitals Were     Pulse Temperature Respirations Height Pulse Oximetry BMI  "(Body Mass Index)    66 98.4  F (36.9  C) (Tympanic) 18 5' 10\" (1.778 m) 96% 36.73 kg/m2       Blood Pressure from Last 3 Encounters:   06/05/18 128/85   11/03/17 115/76   08/03/17 132/81    Weight from Last 3 Encounters:   06/05/18 256 lb (116.1 kg)   11/03/17 247 lb 6.4 oz (112.2 kg)   08/03/17 242 lb 6.4 oz (110 kg)              We Performed the Following     BNP-N terminal pro     CBC with platelets differential     Comprehensive metabolic panel (BMP + Alb, Alk Phos, ALT, AST, Total. Bili, TP)     GASTROENTEROLOGY ADULT REF PROCEDURE ONLY [facility] [phone]; No Provider Preference     Hemoglobin A1c     Spirometry, Breathing Capacity: Normal Order, Clinic Performed     XR Chest 2 Views          Today's Medication Changes          These changes are accurate as of 6/5/18  3:04 PM.  If you have any questions, ask your nurse or doctor.               Start taking these medicines.        Dose/Directions    budesonide-formoterol 80-4.5 MCG/ACT Inhaler   Commonly known as:  SYMBICORT   Used for:  Chronic obstructive pulmonary disease, unspecified COPD type (H)   Started by:  Roly Tomas PA-C        Dose:  2 puff   Inhale 2 puffs into the lungs 2 times daily   Quantity:  1 Inhaler   Refills:  3       predniSONE 20 MG tablet   Commonly known as:  DELTASONE   Used for:  Chronic obstructive pulmonary disease, unspecified COPD type (H)   Started by:  Roly Tomas PA-C        Dose:  20 mg   Take 1 tablet (20 mg) by mouth 2 times daily   Quantity:  10 tablet   Refills:  0         These medicines have changed or have updated prescriptions.        Dose/Directions    aspirin 81 MG EC tablet   This may have changed:  when to take this   Used for:  Coronary atherosclerosis due to lipid rich plaque        Dose:  81 mg   Take 1 tablet (81 mg) by mouth daily   Quantity:  60 tablet   Refills:  3         Stop taking these medicines if you haven't already. Please contact your care team if you have questions.     " azithromycin 250 MG tablet   Commonly known as:  ZITHROMAX   Stopped by:  Roly Tomas PA-C           traMADol 50 MG tablet   Commonly known as:  ULTRAM   Stopped by:  Roly Tomas PA-C                Where to get your medicines      These medications were sent to [id number] - [facility] [address] AT [facility] [address]  [address], [facility] [address]     Phone:  [phone]     albuterol 108 (90 Base) MCG/ACT Inhaler    amLODIPine-benazepril 10-40 MG per capsule    budesonide-formoterol 80-4.5 MCG/ACT Inhaler    cilostazol 50 MG tablet    clopidogrel 75 MG tablet    metoprolol succinate 25 MG 24 hr tablet    predniSONE 20 MG tablet    rosuvastatin 20 MG tablet    varenicline 0.5 MG X 11 & 1 MG X 42 tablet    varenicline 1 MG tablet                Primary Care Provider Office Phone # Fax #    Roly Tomas PA-C [phone]       [address] [facility] [address]        Equal Access to Services     : Hadii aad ku hadasho Soomaali, waaxda luqadaha, qaybta kaalmada adeegyada, waxay idiin hayaan adeeg kharatyree huynh . [facility] [phone].    ATENCIÓN: Si habla español, tiene a hernandez disposición servicios gratuitos de asistencia lingüística. [facility] [phone].    We comply with applicable federal civil rights laws and [place] laws. We do not discriminate on the basis of race, color, national origin, age, disability, sex, sexual orientation, or gender identity.            Thank you!     Thank you for choosing [facility]  for your care. Our goal is always to provide you with excellent care. Hearing back from our patients is one way we can continue to improve our services. Please take a few minutes to complete the written survey that you may receive in the mail after your visit with us. Thank you!             Your Updated Medication List - Protect others around you: Learn how to safely use, store and throw away  your medicines at www.disposemymeds.org.          This list is accurate as of 6/5/18  3:04 PM.  Always use your most recent med list.                   Brand Name Dispense Instructions for use Diagnosis    albuterol 108 (90 Base) MCG/ACT Inhaler    PROAIR HFA/PROVENTIL HFA/VENTOLIN HFA    1 Inhaler    Inhale 2 puffs into the lungs every 6 hours as needed for shortness of breath / dyspnea or wheezing    Chronic obstructive pulmonary disease, unspecified COPD type (H)       amLODIPine-benazepril 10-40 MG per capsule    LOTREL    90 capsule    Take 1 capsule by mouth daily    Benign essential hypertension       aspirin 81 MG EC tablet     60 tablet    Take 1 tablet (81 mg) by mouth daily    Coronary atherosclerosis due to lipid rich plaque       budesonide-formoterol 80-4.5 MCG/ACT Inhaler    SYMBICORT    1 Inhaler    Inhale 2 puffs into the lungs 2 times daily    Chronic obstructive pulmonary disease, unspecified COPD type (H)       cilostazol 50 MG tablet    PLETAL    180 tablet    TAKE 1 TABLET(50 MG) BY MOUTH TWICE DAILY    Aorto-iliac atherosclerosis (H)       clopidogrel 75 MG tablet    PLAVIX    90 tablet    TAKE 1 TABLET(75 MG) BY MOUTH DAILY    Aorto-iliac atherosclerosis (H)       metoprolol succinate 25 MG 24 hr tablet    TOPROL-XL    90 tablet    Take 1 tablet (25 mg) by mouth every evening    Coronary atherosclerosis due to lipid rich plaque       predniSONE 20 MG tablet    DELTASONE    10 tablet    Take 1 tablet (20 mg) by mouth 2 times daily    Chronic obstructive pulmonary disease, unspecified COPD type (H)       rosuvastatin 20 MG tablet    CRESTOR    90 tablet    Take 1 tablet (20 mg) by mouth daily    Coronary atherosclerosis due to lipid rich plaque       * varenicline 0.5 MG X 11 & 1 MG X 42 tablet    CHANTIX STARTING MONTH CHARLES    53 tablet    Take 0.5 mg tab daily for 3 days, then 0.5 mg tab twice daily for 4 days, then 1 mg twice daily.    Tobacco use disorder       * varenicline 1 MG tablet     CHANTIX    56 tablet    Take 1 tablet (1 mg) by mouth 2 times daily    Tobacco use disorder       * Notice:  This list has 2 medication(s) that are the same as other medications prescribed for you. Read the directions carefully, and ask your doctor or other care provider to review them with you.

## 2018-06-05 NOTE — LETTER
June 12, 2018      Santos Rajan  [address] [facility] [address]        Dear Roshan,    We are writing to inform you of your test results.    Overall, your labs looked very good. Your BNP, a heart stress measurement, came back slightly elevated. As such, I'd like to assess your heart structure and function with and echocardiogram (essentially and ultrasound of your heart).  Please call [phone] to schedule an appointment.    Keep me posted as to how your breathing goes as you start the medication I prescribed.    Resulted Orders   Comprehensive metabolic panel (BMP + Alb, Alk Phos, ALT, AST, Total. Bili, TP)   Result Value Ref Range    Sodium 144 133 - 144 mmol/L    Potassium 4.7 3.4 - 5.3 mmol/L    Chloride 106 94 - 109 mmol/L    Carbon Dioxide 32 20 - 32 mmol/L    Anion Gap 6 3 - 14 mmol/L    Glucose 102 (H) 70 - 99 mg/dL      Comment:      Non Fasting    Urea Nitrogen 17 7 - 30 mg/dL    Creatinine 0.82 0.66 - 1.25 mg/dL    GFR Estimate >90 >60 mL/min/1.7m2      Comment:      Non  GFR Calc    GFR Estimate If Black >90 >60 mL/min/1.7m2      Comment:       GFR Calc    Calcium 9.3 8.5 - 10.1 mg/dL    Bilirubin Total 0.5 0.2 - 1.3 mg/dL    Albumin 3.6 3.4 - 5.0 g/dL    Protein Total 7.7 6.8 - 8.8 g/dL    Alkaline Phosphatase 81 40 - 150 U/L    ALT 33 0 - 70 U/L    AST 18 0 - 45 U/L   Hemoglobin A1c   Result Value Ref Range    Hemoglobin A1C 6.1 (H) 0 - 5.6 %      Comment:      Normal <5.7% Prediabetes 5.7-6.4%  Diabetes 6.5% or higher - adopted from ADA   consensus guidelines.     CBC with platelets differential   Result Value Ref Range    WBC 9.9 4.0 - 11.0 10e9/L    RBC Count 5.48 4.4 - 5.9 10e12/L    Hemoglobin 16.9 13.3 - 17.7 g/dL    Hematocrit 53.7 (H) 40.0 - 53.0 %    MCV 98 78 - 100 fl    MCH 30.8 26.5 - 33.0 pg    MCHC 31.5 31.5 - 36.5 g/dL    RDW 14.3 10.0 - 15.0 %    Platelet Count 171 150 - 450 10e9/L    Diff Method Automated Method     % Neutrophils 60.2  %    % Lymphocytes 26.8 %    % Monocytes 10.1 %    % Eosinophils 2.7 %    % Basophils 0.2 %    Absolute Neutrophil 6.0 1.6 - 8.3 10e9/L    Absolute Lymphocytes 2.7 0.8 - 5.3 10e9/L    Absolute Monocytes 1.0 0.0 - 1.3 10e9/L    Absolute Eosinophils 0.3 0.0 - 0.7 10e9/L    Absolute Basophils 0.0 0.0 - 0.2 10e9/L   BNP-N terminal pro   Result Value Ref Range    N-Terminal Pro Bnp 373 (H) 0 - 125 pg/mL      Comment:         Reference range shown and results flagged as abnormal are for the outpatient,   non acute settings. Establishing a baseline value for each individual patient   is useful for follow-up.  Suggested inpatient cut points for confirming diagnosis of CHF in an acute   setting are:   >450 pg/mL (age 18 to less than 50)   >900 pg/mL (age 50 to less than 75)   >1800 pg/mL (75 yrs and older)  An inpatient or emergency department NT-proPBNP <300 pg/mL effectively rules   out acute CHF, with 99% negative predictive value.          If you have any questions or concerns, please call the clinic at the number listed above.       Sincerely,    Roly Tomas PA-C/alma rosa

## 2018-06-07 ASSESSMENT — ANXIETY QUESTIONNAIRES
5. BEING SO RESTLESS THAT IT IS HARD TO SIT STILL: NOT AT ALL
GAD7 TOTAL SCORE: 0
7. FEELING AFRAID AS IF SOMETHING AWFUL MIGHT HAPPEN: NOT AT ALL
1. FEELING NERVOUS, ANXIOUS, OR ON EDGE: NOT AT ALL
6. BECOMING EASILY ANNOYED OR IRRITABLE: NOT AT ALL
3. WORRYING TOO MUCH ABOUT DIFFERENT THINGS: NOT AT ALL
2. NOT BEING ABLE TO STOP OR CONTROL WORRYING: NOT AT ALL
IF YOU CHECKED OFF ANY PROBLEMS ON THIS QUESTIONNAIRE, HOW DIFFICULT HAVE THESE PROBLEMS MADE IT FOR YOU TO DO YOUR WORK, TAKE CARE OF THINGS AT HOME, OR GET ALONG WITH OTHER PEOPLE: NOT DIFFICULT AT ALL

## 2018-06-07 ASSESSMENT — PATIENT HEALTH QUESTIONNAIRE - PHQ9: 5. POOR APPETITE OR OVEREATING: NOT AT ALL

## 2018-06-08 ASSESSMENT — PATIENT HEALTH QUESTIONNAIRE - PHQ9: SUM OF ALL RESPONSES TO PHQ QUESTIONS 1-9: 1

## 2018-06-08 ASSESSMENT — ANXIETY QUESTIONNAIRES: GAD7 TOTAL SCORE: 0

## 2018-06-13 ENCOUNTER — TELEPHONE (OUTPATIENT)
Dept: FAMILY MEDICINE | Facility: CLINIC | Age: 63
End: 2018-06-13

## 2018-06-13 NOTE — TELEPHONE ENCOUNTER
Per chart,  called patient to assist in scheduling Echocardiogram.  Notes Recorded by Roly Tomas PA-C on 6/6/2018 at 6:05 AM  Please contact amelie with the number for him to call and schedule his echocardiogram (salome).

## 2018-06-18 ENCOUNTER — RADIANT APPOINTMENT (OUTPATIENT)
Dept: CARDIOLOGY | Facility: CLINIC | Age: 63
End: 2018-06-18
Attending: PHYSICIAN ASSISTANT
Payer: COMMERCIAL

## 2018-06-18 DIAGNOSIS — R06.09 DYSPNEA ON EXERTION: ICD-10-CM

## 2018-06-18 PROCEDURE — 93306 TTE W/DOPPLER COMPLETE: CPT | Performed by: INTERNAL MEDICINE

## 2018-06-20 ENCOUNTER — MYC MEDICAL ADVICE (OUTPATIENT)
Dept: FAMILY MEDICINE | Facility: CLINIC | Age: 63
End: 2018-06-20

## 2018-06-21 ENCOUNTER — MYC MEDICAL ADVICE (OUTPATIENT)
Dept: FAMILY MEDICINE | Facility: CLINIC | Age: 63
End: 2018-06-21

## 2018-06-21 NOTE — TELEPHONE ENCOUNTER
Patient requesting results from Echo on 6/18/18.  Routing to provider to advise on results.   Manasa Alexis RN

## 2018-06-22 NOTE — TELEPHONE ENCOUNTER
Let amelie know that his echo came back showing no abnormalities. His heart structure and function looked good.

## 2018-07-18 ENCOUNTER — TELEPHONE (OUTPATIENT)
Dept: FAMILY MEDICINE | Facility: CLINIC | Age: 63
End: 2018-07-18

## 2018-07-18 NOTE — TELEPHONE ENCOUNTER
Prior Authorization Retail Medication Request    Medication/Dose: varenicline (CHANTIX STARTING MONTH CHARLES) 0.5 MG X 11 & 1 MG X 42 tablet  ICD code (if different than what is on RX):  Tobacco use disorder [F17.200]  Previously Tried and Failed: Nicotine patch and OTC nicotine gum  Rationale:  Patient requests to quit smoking    Insurance Name:  [facility]  Insurance ID:  [id number]      Pharmacy Information (if different than what is on RX)  Name:  [facility]  Phone:  [phone]

## 2018-07-18 NOTE — TELEPHONE ENCOUNTER
Prior Authorization Retail Medication Request    Medication/Dose: varenicline (CHANTIX) 1 MG tablet  ICD code (if different than what is on RX):  Tobacco use disorder [F17.200]   Previously Tried and Failed:  Nicotine patch and OTC nicotine gum  Rationale:  Patient requests Rx for smoking cessation    Insurance Name: [facility]  Insurance ID: [id number]      Pharmacy Information (if different than what is on RX)  Name:  [facility]  Phone:  [phone]

## 2018-07-19 NOTE — TELEPHONE ENCOUNTER
PA Initiation    Medication: varenicline (CHANTIX) 1 MG tablet  Insurance Company: [email or web address] - Phone [phone] Fax [phone]  Pharmacy Filling the Rx: [facility] [address] [place] [address] [address] AT [facility] OF  [address]  Filling Pharmacy Phone: [phone]  Filling Pharmacy Fax:    Start Date: 7/19/2018    THIS HAS BEEN SUBMITTED BY THE PRIOR-AUTHORIZATION TEAM. ANY QUESTIONS PLEASE CALL [phone]. THANK YOU

## 2018-07-19 NOTE — TELEPHONE ENCOUNTER
PA Initiation    Medication: varenicline (CHANTIX STARTING MONTH CHARLES) 0.5 MG X 11 & 1 MG X 42 tablet  Insurance Company: "[employer] - Phone [phone] Fax [phone]  Pharmacy Filling the Rx: [facility] [address] [facility] [address] AT [facility] OF  [address]  Filling Pharmacy Phone: [phone]  Filling Pharmacy Fax:    Start Date: 7/19/2018      THIS HAS BEEN SUBMITTED BY THE PRIOR-AUTHORIZATION TEAM. ANY QUESTIONS PLEASE CALL [phone]. THANK YOU

## 2018-07-23 NOTE — TELEPHONE ENCOUNTER
Called [employer] and per rep this medication is still under review.  They will fax a letter once they reached a decision.

## 2018-07-26 NOTE — TELEPHONE ENCOUNTER
"Called [employer] again, stated that it was marked as an \"urgent\" request but once it is sent off to [facility] it is out of their hands.  It is still in review as far as [employer] sees.  Called [facility] [phone] and per rep standard turn around time is 7-10 days.  We should hear soon on outcome of this PA.   "

## 2018-07-26 NOTE — TELEPHONE ENCOUNTER
"Called [employer] again, stated that it was marked as an \"urgent\" request but once it is sent off to [facility] it is out of their hands.  It is still in review as far as [employer] sees.  Called [facility] [phone] and per rep standard turn around time is 7-10 days.  We should hear soon on outcome of this PA.   "

## 2018-08-02 ENCOUNTER — OFFICE VISIT (OUTPATIENT)
Dept: URGENT CARE | Facility: RETAIL CLINIC | Age: 63
End: 2018-08-02
Payer: COMMERCIAL

## 2018-08-02 ENCOUNTER — MYC MEDICAL ADVICE (OUTPATIENT)
Dept: FAMILY MEDICINE | Facility: CLINIC | Age: 63
End: 2018-08-02

## 2018-08-02 VITALS
SYSTOLIC BLOOD PRESSURE: 102 MMHG | DIASTOLIC BLOOD PRESSURE: 72 MMHG | TEMPERATURE: 98.7 F | HEART RATE: 63 BPM | OXYGEN SATURATION: 92 %

## 2018-08-02 DIAGNOSIS — J02.9 ACUTE PHARYNGITIS, UNSPECIFIED ETIOLOGY: Primary | ICD-10-CM

## 2018-08-02 LAB — S PYO AG THROAT QL IA.RAPID: NORMAL

## 2018-08-02 PROCEDURE — 99203 OFFICE O/P NEW LOW 30 MIN: CPT | Performed by: PHYSICIAN ASSISTANT

## 2018-08-02 PROCEDURE — 87081 CULTURE SCREEN ONLY: CPT | Performed by: PHYSICIAN ASSISTANT

## 2018-08-02 PROCEDURE — 87880 STREP A ASSAY W/OPTIC: CPT | Mod: QW | Performed by: PHYSICIAN ASSISTANT

## 2018-08-02 RX ORDER — CHLORHEXIDINE GLUCONATE ORAL RINSE 1.2 MG/ML
SOLUTION DENTAL
Refills: 0 | COMMUNITY
Start: 2018-02-11 | End: 2019-02-19

## 2018-08-02 RX ORDER — IBUPROFEN AND PSEUDOEPHEDRINE HYDROCHLORIDE 200; 30 MG/1; MG/1
TABLET, COATED ORAL SEE ADMIN INSTRUCTIONS
Refills: 0 | COMMUNITY
Start: 2017-10-18 | End: 2018-08-02

## 2018-08-02 RX ORDER — AZITHROMYCIN 250 MG/1
TABLET, FILM COATED ORAL
Refills: 0 | COMMUNITY
Start: 2017-11-03 | End: 2018-08-02

## 2018-08-02 RX ORDER — TRAMADOL HYDROCHLORIDE 50 MG/1
TABLET ORAL
Refills: 0 | COMMUNITY
Start: 2017-11-27 | End: 2018-08-02

## 2018-08-02 NOTE — PROGRESS NOTES
Chief Complaint   Patient presents with     Pharyngitis     s/t x 2 days left side of neck swollen      SUBJECTIVE:  Santos Rajan is a 63 year old male presenting with a chief complaint of a sore throat.  Onset of symptoms was 2 days ago.  Course of illness: gradual onset.  Severity: moderate  Current and Associated symptoms: left cervical lymph nodes tender and swollen  Treatment measures tried include: Tylenol/Ibuprofen.  Predisposing factors include: None.    Past Medical History:   Diagnosis Date     AAA (abdominal aortic aneurysm) (H)      Aortoiliac occlusive disease (H)      Arthritis 1995    knee and hands     Benign essential hypertension 6/14/2016     Brain tumor (benign) (H)     Being monitored, Lizbeth      Diverticula of colon     No surgery      Red blood cell antibody positive     Anti-[place]     Umbilical hernia 9/1/2015     Current Outpatient Prescriptions   Medication Sig Dispense Refill     amLODIPine-benazepril (LOTREL) 10-40 MG per capsule Take 1 capsule by mouth daily 90 capsule 1     aspirin EC 81 MG EC tablet Take 1 tablet (81 mg) by mouth daily (Patient taking differently: Take 81 mg by mouth every evening ) 60 tablet 3     budesonide-formoterol (SYMBICORT) 80-4.5 MCG/ACT Inhaler Inhale 2 puffs into the lungs 2 times daily 1 Inhaler 3     cilostazol (PLETAL) 50 MG tablet TAKE 1 TABLET(50 MG) BY MOUTH TWICE DAILY 180 tablet 1     clopidogrel (PLAVIX) 75 MG tablet TAKE 1 TABLET(75 MG) BY MOUTH DAILY 90 tablet 1     metoprolol succinate (TOPROL-XL) 25 MG 24 hr tablet Take 1 tablet (25 mg) by mouth every evening 90 tablet 3     rosuvastatin (CRESTOR) 20 MG tablet Take 1 tablet (20 mg) by mouth daily 90 tablet 3     albuterol (PROAIR HFA/PROVENTIL HFA/VENTOLIN HFA) 108 (90 Base) MCG/ACT Inhaler Inhale 2 puffs into the lungs every 6 hours as needed for shortness of breath / dyspnea or wheezing (Patient not taking: Reported on 8/2/2018) 1 Inhaler 1     chlorhexidine (PERIDEX) 0.12 % solution USE 15  ML UTD BID AFTER HUNTER AND BEFORE BED.  0     varenicline (CHANTIX STARTING MONTH CHARLES) 0.5 MG X 11 & 1 MG X 42 tablet Take 0.5 mg tab daily for 3 days, then 0.5 mg tab twice daily for 4 days, then 1 mg twice daily. (Patient not taking: Reported on 8/2/2018) 53 tablet 0     varenicline (CHANTIX) 1 MG tablet Take 1 tablet (1 mg) by mouth 2 times daily (Patient not taking: Reported on 8/2/2018) 56 tablet 2     Social History   Substance Use Topics     Smoking status: Current Some Day Smoker     Packs/day: 0.50     Years: 40.00     Start date: 3/1/1973     Last attempt to quit: 8/1/2015     Smokeless tobacco: Never Used     Alcohol use 0.0 oz/week     0 Standard drinks or equivalent per week      Comment: minimal     Allergies   Allergen Reactions     Blood Transfusion Related (Informational Only) Other (See Comments)     Patient has a history of a clinically significant antibody against RBC antigens.  A delay in compatible RBCs may occur.     Codeine      Patient reports mom had severe reaction to codeine     REVIEW OF SYSTEMS  General: POSITIVE for fatigue and left sided tender cervical adenopathy. NEGATIVE for fever, chills, dizziness.  Skin: Negative for rash.  ENT: POSITIVE for sore throat. NEGATIVE for ear infections, sinus congestion, postnasal drainage.  Resp: NEGATIVE for cough.    OBJECTIVE:   /72  Pulse 63  Temp 98.7  F (37.1  C) (Oral)  SpO2 92%  GENERAL APPEARANCE: healthy, alert and in no distress  HEENT: Eyes PEERL, conjunctiva clear. Bilateral ear canals and TMs normal. Nose normal. Pharynx erythematous without tonsillar hypertrophy or exudate noted.  NECK: supple, tender to palpation, moderate left sided anterior cervical adenopathy noted  RESP: lungs clear to auscultation - no rales, rhonchi or wheezes  CV: regular rates and rhythm, normal S1 S2, no murmur noted  SKIN: no suspicious lesions or rashes    Rapid Strep test is negative; await throat culture results.    ASSESSMENT:    ICD-10-CM    1.  "Acute pharyngitis, unspecified etiology J02.9 RAPID STREP SCREEN     BETA STREP GROUP A R/O CULTURE     PLAN:   Patient Instructions   Rapid strep test today is negative.   Your throat culture is pending. [facility] will call if positive results to start antibiotics at that time; No call if the culture is negative.  Drink plenty of fluids and rest.  May use salt water gargles- about 8 oz warm water with about 1 teaspoon salt  Sucrets and Cepacol spray are over the counter medications that numb the throat.  Over the counter pain relievers such as tylenol or ibuprofen may be used as needed.   Honey lemon tea helps to soothe the throat. \"Throat Coat\" tea is soothing as well.  Please follow up with primary care provider if not improving, worsening or new symptoms.    Call your primary care provider to discuss low blood pressure  Ask about lowering/reducing meds and making a follow up appointment    Follow up with primary care provider with any problems, questions or concerns or if symptoms worsen or fail to improve. Patient agreed to plan and verbalized understanding.    Kitty Castillo PA-C  [facility] [place]  "

## 2018-08-02 NOTE — PATIENT INSTRUCTIONS
"Rapid strep test today is negative.   Your throat culture is pending. [facility] will call if positive results to start antibiotics at that time; No call if the culture is negative.  Drink plenty of fluids and rest.  May use salt water gargles- about 8 oz warm water with about 1 teaspoon salt  Sucrets and Cepacol spray are over the counter medications that numb the throat.  Over the counter pain relievers such as tylenol or ibuprofen may be used as needed.   Honey lemon tea helps to soothe the throat. \"Throat Coat\" tea is soothing as well.  Please follow up with primary care provider if not improving, worsening or new symptoms.    Call your primary care provider to discuss low blood pressure  Ask about lowering/reducing meds and making a follow up appointment  "

## 2018-08-02 NOTE — MR AVS SNAPSHOT
"              After Visit Summary   8/2/2018    Santos Rajan    MRN: [id number]           Patient Information     Date Of Birth          1955        Visit Information        Provider Department      8/2/2018 9:25 AM Nina Castillo PA-C [facility] [place]        Today's Diagnoses     Acute pharyngitis, unspecified etiology    -  1      Care Instructions    Rapid strep test today is negative.   Your throat culture is pending. [facility] will call if positive results to start antibiotics at that time; No call if the culture is negative.  Drink plenty of fluids and rest.  May use salt water gargles- about 8 oz warm water with about 1 teaspoon salt  Sucrets and Cepacol spray are over the counter medications that numb the throat.  Over the counter pain relievers such as tylenol or ibuprofen may be used as needed.   Honey lemon tea helps to soothe the throat. \"Throat Coat\" tea is soothing as well.  Please follow up with primary care provider if not improving, worsening or new symptoms.    Call your primary care provider to discuss low blood pressure  Ask about lowering/reducing meds and making a follow up appointment          Follow-ups after your visit        Who to contact     You can reach your care team any time of the day by calling [phone].  Notification of test results:  If you have an abnormal lab result, we will notify you by phone as soon as possible.         Additional Information About Your Visit        MyChart Information     [employer] gives you secure access to your electronic health record. If you see a primary care provider, you can also send messages to your care team and make appointments. If you have questions, please call your primary care clinic.  If you do not have a primary care provider, please call [phone] and they will assist you.        Care EveryWhere ID     This is your Care EveryWhere ID. This could be used by other organizations to access your [place] " medical records  [id number]        Your Vitals Were     Pulse Temperature Pulse Oximetry             63 98.7  F (37.1  C) (Oral) 92%          Blood Pressure from Last 3 Encounters:   08/02/18 96/69   06/05/18 128/85   11/03/17 115/76    Weight from Last 3 Encounters:   06/05/18 256 lb (116.1 kg)   11/03/17 247 lb 6.4 oz (112.2 kg)   08/03/17 242 lb 6.4 oz (110 kg)              We Performed the Following     BETA STREP GROUP A R/O CULTURE     RAPID STREP SCREEN          Today's Medication Changes          These changes are accurate as of 8/2/18  9:52 AM.  If you have any questions, ask your nurse or doctor.               These medicines have changed or have updated prescriptions.        Dose/Directions    aspirin 81 MG EC tablet   This may have changed:  when to take this   Used for:  Coronary atherosclerosis due to lipid rich plaque        Dose:  81 mg   Take 1 tablet (81 mg) by mouth daily   Quantity:  60 tablet   Refills:  3               Information about OPIOIDS     PRESCRIPTION OPIOIDS: WHAT YOU NEED TO KNOW   We gave you an opioid (narcotic) pain medicine. It is important to manage your pain, but opioids are not always the best choice. You should first try all the other options your care team gave you. Take this medicine for as short a time (and as few doses) as possible.     These medicines have risks:    DO NOT drive when on new or higher doses of pain medicine. These medicines can affect your alertness and reaction times, and you could be arrested for driving under the influence (DUI). If you need to use opioids long-term, talk to your care team about driving.    DO NOT operate heave machinery    DO NOT do any other dangerous activities while taking these medicines.     DO NOT drink any alcohol while taking these medicines.      If the opioid prescribed includes acetaminophen, DO NOT take with any other medicines that contain acetaminophen. Read all labels carefully. Look for the word  acetaminophen  or   Tylenol.  Ask your pharmacist if you have questions or are unsure.    You can get addicted to pain medicines, especially if you have a history of addiction (chemical, alcohol or substance dependence). Talk to your care team about ways to reduce this risk.    Store your pills in a secure place, locked if possible. We will not replace any lost or stolen medicine. If you don t finish your medicine, please throw away (dispose) as directed by your pharmacist. The [place] Pollution Control Agency has more information about safe disposal: https://www.pca.[facility].mn.us/living-green/managing-unwanted-medications.     All opioids tend to cause constipation. Drink plenty of water and eat foods that have a lot of fiber, such as fruits, vegetables, prune juice, apple juice and high-fiber cereal. Take a laxative (Miralax, milk of magnesia, Colace, Senna) if you don t move your bowels at least every other day.          Primary Care Provider Office Phone # Fax #    Roly Tomas PA-C [phone]       [id number] [facility] [address] [place] [address]        Equal Access to Services     BISMARK [facility] CenterSAULO : Hadii aad ku hadasho Soomaali, waaxda luqadaha, qaybta kaalmada adelyleyaolga, barbara huynh . So [facility] [phone].    ATENCIÓN: Si habla español, tiene a hernandez disposición servicios gratuitos de asistencia lingüística. [facility] [phone].    We comply with applicable federal civil rights laws and [place] laws. We do not discriminate on the basis of race, color, national origin, age, disability, sex, sexual orientation, or gender identity.            Thank you!     Thank you for choosing [facility]  for your care. Our goal is always to provide you with excellent care. Hearing back from our patients is one way we can continue to improve our services. Please take a few minutes to complete the written survey that you may receive in the mail after your visit with us. Thank you!              Your Updated Medication List - Protect others around you: Learn how to safely use, store and throw away your medicines at www.disposemymeds.org.          This list is accurate as of 8/2/18  9:52 AM.  Always use your most recent med list.                   Brand Name Dispense Instructions for use Diagnosis    ADVIL COLD/SINUS  MG Tabs   Generic drug:  Pseudoephedrine-Ibuprofen      See Admin Instructions        albuterol 108 (90 Base) MCG/ACT Inhaler    PROAIR HFA/PROVENTIL HFA/VENTOLIN HFA    1 Inhaler    Inhale 2 puffs into the lungs every 6 hours as needed for shortness of breath / dyspnea or wheezing    Chronic obstructive pulmonary disease, unspecified COPD type (H)       amLODIPine-benazepril 10-40 MG per capsule    LOTREL    90 capsule    Take 1 capsule by mouth daily    Benign essential hypertension       aspirin 81 MG EC tablet     60 tablet    Take 1 tablet (81 mg) by mouth daily    Coronary atherosclerosis due to lipid rich plaque       azithromycin 250 MG tablet    ZITHROMAX          budesonide-formoterol 80-4.5 MCG/ACT Inhaler    SYMBICORT    1 Inhaler    Inhale 2 puffs into the lungs 2 times daily    Chronic obstructive pulmonary disease, unspecified COPD type (H)       chlorhexidine 0.12 % solution    PERIDEX     USE 15 ML UTD BID AFTER HUNTER AND BEFORE BED.        cilostazol 50 MG tablet    PLETAL    180 tablet    TAKE 1 TABLET(50 MG) BY MOUTH TWICE DAILY    Aorto-iliac atherosclerosis (H)       clopidogrel 75 MG tablet    PLAVIX    90 tablet    TAKE 1 TABLET(75 MG) BY MOUTH DAILY    Aorto-iliac atherosclerosis (H)       metoprolol succinate 25 MG 24 hr tablet    TOPROL-XL    90 tablet    Take 1 tablet (25 mg) by mouth every evening    Coronary atherosclerosis due to lipid rich plaque       predniSONE 20 MG tablet    DELTASONE    10 tablet    Take 1 tablet (20 mg) by mouth 2 times daily    Chronic obstructive pulmonary disease, unspecified COPD type (H)       rosuvastatin 20 MG tablet     CRESTOR    90 tablet    Take 1 tablet (20 mg) by mouth daily    Coronary atherosclerosis due to lipid rich plaque       traMADol 50 MG tablet    ULTRAM     TK 1 T PO  Q 6 H PRN P        * varenicline 0.5 MG X 11 & 1 MG X 42 tablet    CHANTIX STARTING MONTH CHARLES    53 tablet    Take 0.5 mg tab daily for 3 days, then 0.5 mg tab twice daily for 4 days, then 1 mg twice daily.    Tobacco use disorder       * varenicline 1 MG tablet    CHANTIX    56 tablet    Take 1 tablet (1 mg) by mouth 2 times daily    Tobacco use disorder       * Notice:  This list has 2 medication(s) that are the same as other medications prescribed for you. Read the directions carefully, and ask your doctor or other care provider to review them with you.

## 2018-08-04 LAB
BACTERIA SPEC CULT: NORMAL
SPECIMEN SOURCE: NORMAL

## 2018-08-06 NOTE — TELEPHONE ENCOUNTER
Prior Authorization Not Needed per Insurance    Medication: varenicline (CHANTIX STARTING MONTH CHARLES) 0.5 MG X 11 & 1 MG X 42 tablet  Insurance Company: [employer] - Phone [phone] Fax [phone]  Expected CoPay:      Pharmacy Filling the Rx: [facility] [address] [place] [address] [id number] AT [facility] [address]  Pharmacy Notified: No  Patient Notified: Yes    Per [employer] the smoking cessation program through [employer] handles the PA's for any smoking cessation medications.  Patient has to initiate this process by calling [phone].  Called patient and gave him this information.  He is aware.  Central PA team cannot do the PA for this medication.

## 2018-08-06 NOTE — TELEPHONE ENCOUNTER
Prior Authorization Not Needed per Insurance    Medication: varenicline (CHANTIX STARTING MONTH CHARLES) 0.5 MG X 11 & 1 MG X 42 tablet  Insurance Company: [employer] - Phone [phone] Fax [phone]  Expected CoPay:      Pharmacy Filling the Rx: [facility] [address] [place] [address] [id number] [facility] [address]  Pharmacy Notified: No  Patient Notified: Yes    Per [employer] the smoking cessation program through [employer] handles the PA's for any smoking cessation medications.  Patient has to initiate this process by calling [phone].  Called patient and gave him this information.  He is aware.  Central PA team cannot do the PA for this medication.

## 2018-08-06 NOTE — TELEPHONE ENCOUNTER
Patient states he would like to be fit in today if possible.  If he can, please cancel his appt tomorrow.

## 2018-08-07 ENCOUNTER — OFFICE VISIT (OUTPATIENT)
Dept: FAMILY MEDICINE | Facility: CLINIC | Age: 63
End: 2018-08-07
Payer: COMMERCIAL

## 2018-08-07 VITALS
RESPIRATION RATE: 18 BRPM | BODY MASS INDEX: 36.22 KG/M2 | HEIGHT: 70 IN | SYSTOLIC BLOOD PRESSURE: 108 MMHG | TEMPERATURE: 98.2 F | HEART RATE: 61 BPM | DIASTOLIC BLOOD PRESSURE: 73 MMHG | OXYGEN SATURATION: 96 % | WEIGHT: 253 LBS

## 2018-08-07 DIAGNOSIS — E66.01 MORBID OBESITY (H): ICD-10-CM

## 2018-08-07 DIAGNOSIS — I10 BENIGN ESSENTIAL HYPERTENSION: Primary | ICD-10-CM

## 2018-08-07 DIAGNOSIS — R53.83 FATIGUE, UNSPECIFIED TYPE: ICD-10-CM

## 2018-08-07 DIAGNOSIS — Z12.5 SCREENING FOR PROSTATE CANCER: ICD-10-CM

## 2018-08-07 DIAGNOSIS — M79.89 RIGHT LEG SWELLING: ICD-10-CM

## 2018-08-07 LAB
ANION GAP SERPL CALCULATED.3IONS-SCNC: 6 MMOL/L (ref 3–14)
BUN SERPL-MCNC: 19 MG/DL (ref 7–30)
CALCIUM SERPL-MCNC: 8.9 MG/DL (ref 8.5–10.1)
CHLORIDE SERPL-SCNC: 106 MMOL/L (ref 94–109)
CO2 SERPL-SCNC: 30 MMOL/L (ref 20–32)
CREAT SERPL-MCNC: 0.88 MG/DL (ref 0.66–1.25)
GFR SERPL CREATININE-BSD FRML MDRD: 87 ML/MIN/1.7M2
GLUCOSE SERPL-MCNC: 111 MG/DL (ref 70–99)
POTASSIUM SERPL-SCNC: 5 MMOL/L (ref 3.4–5.3)
PSA SERPL-ACNC: 0.46 UG/L (ref 0–4)
SODIUM SERPL-SCNC: 142 MMOL/L (ref 133–144)
TSH SERPL DL<=0.005 MIU/L-ACNC: 2.35 MU/L (ref 0.4–4)

## 2018-08-07 PROCEDURE — 80048 BASIC METABOLIC PNL TOTAL CA: CPT | Performed by: PHYSICIAN ASSISTANT

## 2018-08-07 PROCEDURE — 84443 ASSAY THYROID STIM HORMONE: CPT | Performed by: PHYSICIAN ASSISTANT

## 2018-08-07 PROCEDURE — 36415 COLL VENOUS BLD VENIPUNCTURE: CPT | Performed by: PHYSICIAN ASSISTANT

## 2018-08-07 PROCEDURE — 99000 SPECIMEN HANDLING OFFICE-LAB: CPT | Performed by: PHYSICIAN ASSISTANT

## 2018-08-07 PROCEDURE — 99214 OFFICE O/P EST MOD 30 MIN: CPT | Performed by: PHYSICIAN ASSISTANT

## 2018-08-07 PROCEDURE — G0103 PSA SCREENING: HCPCS | Performed by: PHYSICIAN ASSISTANT

## 2018-08-07 PROCEDURE — 86617 LYME DISEASE ANTIBODY: CPT | Mod: 90 | Performed by: PHYSICIAN ASSISTANT

## 2018-08-07 PROCEDURE — 86618 LYME DISEASE ANTIBODY: CPT | Performed by: PHYSICIAN ASSISTANT

## 2018-08-07 NOTE — PROGRESS NOTES
SUBJECTIVE:   Santos Rajan is a 63 year old male who presents to clinic today for the following health issues:      Pt here for follow up low BP from visit 08/2/18, also concern about right shin-had an injury 1 yr ago    He denies symptoms of dizziness or lightheadedness.   Still noting fatigue. Occasional constipation.   Problem list and histories reviewed & adjusted, as indicated.  Additional history: as documented    Patient Active Problem List   Diagnosis     CARDIOVASCULAR SCREENING; LDL GOAL LESS THAN 160     Impaired fasting glucose     Low testosterone     Fatty liver     Umbilical hernia     Colon polyps     Advanced directives, counseling/discussion     Benign essential hypertension     Diverticulosis of large intestine without hemorrhage     Status post coronary angiogram     AAA (abdominal aortic aneurysm) without rupture (H)     Aorto-iliac atherosclerosis (H)     Health Care Home     Past Surgical History:   Procedure Laterality Date     ABDOMEN SURGERY  1998    Gall bladder     CHOLECYSTECTOMY  1998     COLONOSCOPY  2015     DENTAL SURGERY      implants      ENDOVASCULAR REPAIR ANEURYSM ABDOMINAL AORTA N/A 4/18/2017    Procedure: ENDOVASCULAR REPAIR ANEURYSM ABDOMINAL AORTA;  Ultra sound guided left/right femoral arterial and left brachial artery access.  Endovascular Abdomnal Aortic  Aneurysm Repair with Endologix AFX2 Bifurcated Endograft system and MORALES   Proximal Endograft System.  Left common and external iliac artery stents using Simplee Epic Vascular Self-expanding stent system. Express LD iliac/     EYE SURGERY  2013    Lasik     GALLBLADDER SURGERY      gall bladder removal      ORTHOPEDIC SURGERY  1996    Broken jaw & femur       Social History   Substance Use Topics     Smoking status: Current Some Day Smoker     Packs/day: 0.50     Years: 40.00     Start date: 3/1/1973     Last attempt to quit: 8/1/2015     Smokeless tobacco: Never Used     Alcohol use 0.0 oz/week     0  Standard drinks or equivalent per week      Comment: minimal     Family History   Problem Relation Age of Onset     Cancer Mother      kidney     Other Cancer Mother      kidney     Aneurysm Father      Other Cancer Brother      lieukemia     Cancer Brother      Leukemia      Sleep Apnea Brother      Coronary Artery Disease No family hx of      Diabetes No family hx of          Current Outpatient Prescriptions   Medication Sig Dispense Refill     albuterol (PROAIR HFA/PROVENTIL HFA/VENTOLIN HFA) 108 (90 Base) MCG/ACT Inhaler Inhale 2 puffs into the lungs every 6 hours as needed for shortness of breath / dyspnea or wheezing 1 Inhaler 1     amLODIPine-benazepril (LOTREL) 10-40 MG per capsule Take 1 capsule by mouth daily 90 capsule 1     aspirin EC 81 MG EC tablet Take 1 tablet (81 mg) by mouth daily (Patient taking differently: Take 81 mg by mouth every evening ) 60 tablet 3     budesonide-formoterol (SYMBICORT) 80-4.5 MCG/ACT Inhaler Inhale 2 puffs into the lungs 2 times daily 1 Inhaler 3     chlorhexidine (PERIDEX) 0.12 % solution USE 15 ML UTD BID AFTER HUNTER AND BEFORE BED.  0     cilostazol (PLETAL) 50 MG tablet TAKE 1 TABLET(50 MG) BY MOUTH TWICE DAILY 180 tablet 1     clopidogrel (PLAVIX) 75 MG tablet TAKE 1 TABLET(75 MG) BY MOUTH DAILY 90 tablet 1     metoprolol succinate (TOPROL-XL) 25 MG 24 hr tablet Take 1 tablet (25 mg) by mouth every evening 90 tablet 3     rosuvastatin (CRESTOR) 20 MG tablet Take 1 tablet (20 mg) by mouth daily 90 tablet 3     varenicline (CHANTIX STARTING MONTH CHARLES) 0.5 MG X 11 & 1 MG X 42 tablet Take 0.5 mg tab daily for 3 days, then 0.5 mg tab twice daily for 4 days, then 1 mg twice daily. 53 tablet 0     varenicline (CHANTIX) 1 MG tablet Take 1 tablet (1 mg) by mouth 2 times daily 56 tablet 2     Allergies   Allergen Reactions     Blood Transfusion Related (Informational Only) Other (See Comments)     Patient has a history of a clinically significant antibody against RBC antigens.  A  delay in compatible RBCs may occur.     Codeine      Patient reports mom had severe reaction to codeine     Recent Labs   Lab Test  06/05/18   1421  04/20/17   0753  04/18/17   1602   06/14/16   1247  04/04/16   0946   08/10/15   0901   A1C  6.1*   --    --    --   5.6  5.7   < >   --    LDL   --    --    --    --   84   --    --   104   HDL   --    --    --    --   36*   --    --   38*   TRIG   --    --    --    --   110   --    --   129   ALT  33   --   19   --   19   --    --   28   CR  0.82  0.61*  0.64*   < >  0.77  0.85   --   0.83   GFRESTIMATED  >90  >90  Non African American GFR Calc    >90  Non  GFR Calc     < >  >90  Non  GFR Calc    >90  Non  GFR Calc     --   >90  Non  GFR Calc     GFRESTBLACK  >90  >90  African American GFR Calc    >90   GFR Calc     < >  >90   GFR Calc    >90   GFR Calc     --   >90   GFR Calc     POTASSIUM  4.7  3.7  4.2   < >  4.3  4.9   --   4.5   TSH   --    --    --    --   2.12   --    --   2.16    < > = values in this interval not displayed.      BP Readings from Last 3 Encounters:   08/07/18 108/73   08/02/18 102/72   06/05/18 128/85    Wt Readings from Last 3 Encounters:   08/07/18 253 lb (114.8 kg)   06/05/18 256 lb (116.1 kg)   11/03/17 247 lb 6.4 oz (112.2 kg)                  Labs reviewed in EPIC    Reviewed and updated as needed this visit by clinical staff  Tobacco  Allergies  Meds  Med Hx  Surg Hx  Fam Hx  Soc Hx      Reviewed and updated as needed this visit by Provider         All other systems negative except as outline above  OBJECTIVE:  Eye exam - right eye normal lid, conjunctiva, cornea, pupil and fundus, left eye normal lid, conjunctiva, cornea, pupil and fundus.  Thyroid not palpable, not enlarged, no nodules detected.  CHEST:chest clear to IPPA, no tachypnea, retractions or cyanosis and S1, S2 normal, no murmur, no gallop,  rate regular.  Mild leg edema    Santos was seen today for musculoskeletal problem and hypertension.    Diagnoses and all orders for this visit:    Benign essential hypertension  -     Basic metabolic panel  (Ca, Cl, CO2, Creat, Gluc, K, Na, BUN)    Morbid obesity (H)    Fatigue, unspecified type  -     TSH with free T4 reflex  -     **CBC with platelets FUTURE anytime; Future  -     Lyme Disease Sandi with reflex to WB Serum  -     SLEEP EVALUATION & MANAGEMENT REFERRAL - ADULT -[facility] - Kami Whitman  [phone] (Age 15 and up); Future    Right leg swelling  -     US Lower Extremity Venous Duplex Right; Future    Screening for prostate cancer  -     PSA, screen      work on lifestyle modification

## 2018-08-07 NOTE — PROGRESS NOTES
"  SUBJECTIVE:   Santos Rajan is a 63 year old male who presents to clinic today for the following health issues:      ENT Symptoms             Symptoms: cc Present Absent Comment   Fever/Chills       Fatigue       Muscle Aches       Eye Irritation       Sneezing       Nasal Saurabh/Drg       Sinus Pressure/Pain       Loss of smell       Dental pain       Sore Throat       Swollen Glands       Ear Pain/Fullness       Cough       Wheeze       Chest Pain       Shortness of breath       Rash       Other         Symptom duration:  ***   Symptom severity:  ***   Treatments tried:  ***   Contacts:  ***         Also concern about BP     Problem list and histories reviewed & adjusted, as indicated.  Additional history: {NONE - AS DOCUMENTED:271710::\"as documented\"}    {HIST REVIEW/ LINKS 2:054278}    Reviewed and updated as needed this visit by clinical staff       Reviewed and updated as needed this visit by Provider         {PROVIDER CHARTING PREFERENCE:017360}  "

## 2018-08-07 NOTE — MR AVS SNAPSHOT
After Visit Summary   8/7/2018    Santos Rajan    MRN: [id number]           Patient Information     Date Of Birth          1955        Visit Information        Provider Department      8/7/2018 7:00 AM Roly Tomsa PA-C [facility]        Today's Diagnoses     Benign essential hypertension    -  1    Morbid obesity (H)        Fatigue, unspecified type        Right leg swelling        Screening for prostate cancer           Follow-ups after your visit        Additional Services     SLEEP EVALUATION & MANAGEMENT REFERRAL - [facility]  [phone] (Age 15 and up)       Please be aware that coverage of these services is subject to the terms and limitations of your health insurance plan.  Call member services at your health plan with any benefit or coverage questions.      Please bring the following to your appointment:    >>   List of current medications   >>   This referral request   >>   Any documents/labs given to you for this referral                      Future tests that were ordered for you today     Open Future Orders        Priority Expected Expires Ordered    **CBC with platelets FUTURE anytime Routine 8/7/2018 8/7/2019 8/7/2018    US Lower Extremity Venous Duplex Right Routine  8/7/2019 8/7/2018    SLEEP EVALUATION & MANAGEMENT REFERRAL - [facility]  [phone] (Age 15 and up) Routine  8/7/2019 8/7/2018            Who to contact     Normal or non-critical lab and imaging results will be communicated to you by MyChart, letter or phone within 4 business days after the clinic has received the results. If you do not hear from us within 7 days, please contact the clinic through MyChart or phone. If you have a critical or abnormal lab result, we will notify you by phone as soon as possible.  Submit refill requests through [employer] or call your pharmacy and they will forward the refill request to us. Please  "allow 3 business days for your refill to be completed.          If you need to speak with a  for additional information , please call: [phone]             Additional Information About Your Visit        Application [employer] Information     [employer] gives you secure access to your electronic health record. If you see a primary care provider, you can also send messages to your care team and make appointments. If you have questions, please call your primary care clinic.  If you do not have a primary care provider, please call [phone] and they will assist you.        Care EveryWhere ID     This is your Care EveryWhere ID. This could be used by other organizations to access your [place] medical records  [id number]        Your Vitals Were     Pulse Temperature Respirations Height Pulse Oximetry BMI (Body Mass Index)    61 98.2  F (36.8  C) (Oral) 18 5' 10\" (1.778 m) 96% 36.3 kg/m2       Blood Pressure from Last 3 Encounters:   08/07/18 108/73   08/02/18 102/72   06/05/18 128/85    Weight from Last 3 Encounters:   08/07/18 253 lb (114.8 kg)   06/05/18 256 lb (116.1 kg)   11/03/17 247 lb 6.4 oz (112.2 kg)              We Performed the Following     Basic metabolic panel  (Ca, Cl, CO2, Creat, Gluc, K, Na, BUN)     Lyme Disease Sandi with reflex to WB Serum     PSA, screen     TSH with free T4 reflex          Today's Medication Changes          These changes are accurate as of 8/7/18  7:52 AM.  If you have any questions, ask your nurse or doctor.               These medicines have changed or have updated prescriptions.        Dose/Directions    aspirin 81 MG EC tablet   This may have changed:  when to take this   Used for:  Coronary atherosclerosis due to lipid rich plaque        Dose:  81 mg   Take 1 tablet (81 mg) by mouth daily   Quantity:  60 tablet   Refills:  3                Primary Care Provider Office Phone # Fax #    Roly Tomas PA-C [phone]       [id number]  [place] " [address]        Equal Access to Services     [facility]: Hadii terri torres lenny Huffman, wathida luqsayda, qaybta kadejaolga lara, barbara wild. So [facility] [phone].    ATENCIÓN: Si habla español, tiene a hernandez disposición servicios gratuitos de asistencia lingüística. Abrilame al [phone].    We comply with applicable federal civil rights laws and [place] laws. We do not discriminate on the basis of race, color, national origin, age, disability, sex, sexual orientation, or gender identity.            Thank you!     Thank you for choosing [facility]  for your care. Our goal is always to provide you with excellent care. Hearing back from our patients is one way we can continue to improve our services. Please take a few minutes to complete the written survey that you may receive in the mail after your visit with us. Thank you!             Your Updated Medication List - Protect others around you: Learn how to safely use, store and throw away your medicines at www.disposemymeds.org.          This list is accurate as of 8/7/18  7:52 AM.  Always use your most recent med list.                   Brand Name Dispense Instructions for use Diagnosis    albuterol 108 (90 Base) MCG/ACT Inhaler    PROAIR HFA/PROVENTIL HFA/VENTOLIN HFA    1 Inhaler    Inhale 2 puffs into the lungs every 6 hours as needed for shortness of breath / dyspnea or wheezing    Chronic obstructive pulmonary disease, unspecified COPD type (H)       amLODIPine-benazepril 10-40 MG per capsule    LOTREL    90 capsule    Take 1 capsule by mouth daily    Benign essential hypertension       aspirin 81 MG EC tablet     60 tablet    Take 1 tablet (81 mg) by mouth daily    Coronary atherosclerosis due to lipid rich plaque       budesonide-formoterol 80-4.5 MCG/ACT Inhaler    SYMBICORT    1 Inhaler    Inhale 2 puffs into the lungs 2 times daily    Chronic obstructive pulmonary disease, unspecified COPD type (H)        chlorhexidine 0.12 % solution    PERIDEX     USE 15 ML UTD BID AFTER HUNTER AND BEFORE BED.        cilostazol 50 MG tablet    PLETAL    180 tablet    TAKE 1 TABLET(50 MG) BY MOUTH TWICE DAILY    Aorto-iliac atherosclerosis (H)       clopidogrel 75 MG tablet    PLAVIX    90 tablet    TAKE 1 TABLET(75 MG) BY MOUTH DAILY    Aorto-iliac atherosclerosis (H)       metoprolol succinate 25 MG 24 hr tablet    TOPROL-XL    90 tablet    Take 1 tablet (25 mg) by mouth every evening    Coronary atherosclerosis due to lipid rich plaque       rosuvastatin 20 MG tablet    CRESTOR    90 tablet    Take 1 tablet (20 mg) by mouth daily    Coronary atherosclerosis due to lipid rich plaque       * varenicline 0.5 MG X 11 & 1 MG X 42 tablet    CHANTIX STARTING MONTH CHARLES    53 tablet    Take 0.5 mg tab daily for 3 days, then 0.5 mg tab twice daily for 4 days, then 1 mg twice daily.    Tobacco use disorder       * varenicline 1 MG tablet    CHANTIX    56 tablet    Take 1 tablet (1 mg) by mouth 2 times daily    Tobacco use disorder       * Notice:  This list has 2 medication(s) that are the same as other medications prescribed for you. Read the directions carefully, and ask your doctor or other care provider to review them with you.

## 2018-08-08 LAB — B BURGDOR IGG+IGM SER QL: 1.07 (ref 0–0.89)

## 2018-08-10 LAB
B BURGDOR IGG SER QL IB: NEGATIVE
B BURGDOR IGM SER QL IB: NEGATIVE

## 2018-08-21 ENCOUNTER — RADIANT APPOINTMENT (OUTPATIENT)
Dept: ULTRASOUND IMAGING | Facility: CLINIC | Age: 63
End: 2018-08-21
Attending: PHYSICIAN ASSISTANT
Payer: COMMERCIAL

## 2018-08-21 DIAGNOSIS — M79.89 RIGHT LEG SWELLING: ICD-10-CM

## 2018-08-21 PROCEDURE — 93971 EXTREMITY STUDY: CPT | Mod: RT

## 2018-11-22 DIAGNOSIS — I25.83 CORONARY ATHEROSCLEROSIS DUE TO LIPID RICH PLAQUE: ICD-10-CM

## 2018-11-23 NOTE — TELEPHONE ENCOUNTER
Routing refill request to provider for review/approval because: Lab and med pended  Labs not current:  Last lipids done in 2016

## 2018-11-23 NOTE — TELEPHONE ENCOUNTER
"Requested Prescriptions   Pending Prescriptions Disp Refills     rosuvastatin (CRESTOR) 20 MG tablet [Pharmacy Med Name: ROSUVASTATIN 20MG TABLETS] 90 tablet 0    Last Written Prescription Date:  08/07/18  Last Fill Quantity: 90,  # refills: 0   Last office visit: 8/7/2018 with prescribing provider:  MAX Tomas Future Office Visit:     Sig: TAKE 1 TABLET(20 MG) BY MOUTH DAILY    Statins Protocol Failed    11/22/2018 11:48 AM       Failed - LDL on file in past 12 months    Recent Labs   Lab Test  06/14/16   1247   LDL  84            Passed - No abnormal creatine kinase in past 12 months    No lab results found.            Passed - Recent (12 mo) or future (30 days) visit within the authorizing provider's specialty    Patient had office visit in the last 12 months or has a visit in the next 30 days with authorizing provider or within the authorizing provider's specialty.  See \"Patient Info\" tab in inbasket, or \"Choose Columns\" in Meds & Orders section of the refill encounter.             Passed - Patient is age 18 or older          "

## 2018-11-26 RX ORDER — ROSUVASTATIN CALCIUM 20 MG/1
TABLET, COATED ORAL
Qty: 30 TABLET | Refills: 0 | Status: SHIPPED | OUTPATIENT
Start: 2018-11-26 | End: 2019-01-17

## 2019-01-02 DIAGNOSIS — I70.8 AORTO-ILIAC ATHEROSCLEROSIS (H): ICD-10-CM

## 2019-01-02 DIAGNOSIS — I70.0 AORTO-ILIAC ATHEROSCLEROSIS (H): ICD-10-CM

## 2019-01-02 DIAGNOSIS — I10 BENIGN ESSENTIAL HYPERTENSION: ICD-10-CM

## 2019-01-03 RX ORDER — CLOPIDOGREL BISULFATE 75 MG/1
TABLET ORAL
Qty: 90 TABLET | Refills: 1 | Status: SHIPPED | OUTPATIENT
Start: 2019-01-03 | End: 2019-12-02

## 2019-01-03 RX ORDER — AMLODIPINE AND BENAZEPRIL HYDROCHLORIDE 10; 40 MG/1; MG/1
1 CAPSULE ORAL DAILY
Qty: 90 CAPSULE | Refills: 1 | Status: SHIPPED | OUTPATIENT
Start: 2019-01-03 | End: 2019-12-02

## 2019-01-03 NOTE — TELEPHONE ENCOUNTER
Prescription approved per [facility] Refill Protocol.  Mehreen Toscano, RN, BSN

## 2019-01-03 NOTE — TELEPHONE ENCOUNTER
"Requested Prescriptions   Pending Prescriptions Disp Refills     clopidogrel (PLAVIX) 75 MG tablet [Pharmacy Med Name: CLOPIDOGREL 75MG TABLETS] 90 tablet 0    Last Written Prescription Date:  9-24-18  Last Fill Quantity: 90,  # refills: 0   Last office visit: 8/7/2018 with prescribing provider:  8-7-18   Future Office Visit:   Sig: TAKE 1 TABLET(75 MG) BY MOUTH DAILY    Plavix Passed - 1/2/2019  9:27 PM       Passed - No active PPI on record unless is Protonix       Passed - Normal HGB on file in past 12 months    Recent Labs   Lab Test 06/05/18  1421   HGB 16.9              Passed - Normal Platelets on file in past 12 months    Recent Labs   Lab Test 06/05/18  1421                 Passed - Recent (12 mo) or future (30 days) visit within the authorizing provider's specialty    Patient had office visit in the last 12 months or has a visit in the next 30 days with authorizing provider or within the authorizing provider's specialty.  See \"Patient Info\" tab in inbasket, or \"Choose Columns\" in Meds & Orders section of the refill encounter.             Passed - Patient is age 18 or older        amLODIPine-benazepril (LOTREL) 10-40 MG capsule [Pharmacy Med Name: AMLODIPINE-BENAZ 10/40MG CAPSULES] 90 capsule 0    Last Written Prescription Date:  9-24-18  Last Fill Quantity: 90,  # refills: 0   Last office visit: 8/7/2018 with prescribing provider:  8-7-18   Future Office Visit:   Sig: TAKE 1 CAPSULE BY MOUTH DAILY    Calcium Channel Blockers Protocol  Passed - 1/2/2019  9:27 PM       Passed - Blood pressure under 140/90 in past 12 months    BP Readings from Last 3 Encounters:   08/07/18 108/73   08/02/18 102/72   06/05/18 128/85                Passed - Recent (12 mo) or future (30 days) visit within the authorizing provider's specialty    Patient had office visit in the last 12 months or has a visit in the next 30 days with authorizing provider or within the authorizing provider's specialty.  See \"Patient Info\" tab " "in inbasket, or \"Choose Columns\" in Meds & Orders section of the refill encounter.             Passed - Patient is age 18 or older       Passed - Normal serum creatinine on file in past 12 months    Recent Labs   Lab Test 08/07/18  0805   CR 0.88             "

## 2019-01-17 ENCOUNTER — OFFICE VISIT (OUTPATIENT)
Dept: FAMILY MEDICINE | Facility: CLINIC | Age: 64
End: 2019-01-17
Payer: COMMERCIAL

## 2019-01-17 VITALS
WEIGHT: 255 LBS | RESPIRATION RATE: 16 BRPM | HEIGHT: 70 IN | BODY MASS INDEX: 36.51 KG/M2 | OXYGEN SATURATION: 96 % | HEART RATE: 69 BPM | TEMPERATURE: 98.3 F | DIASTOLIC BLOOD PRESSURE: 80 MMHG | SYSTOLIC BLOOD PRESSURE: 128 MMHG

## 2019-01-17 DIAGNOSIS — R53.83 FATIGUE, UNSPECIFIED TYPE: ICD-10-CM

## 2019-01-17 DIAGNOSIS — N41.1 CHRONIC PROSTATITIS: ICD-10-CM

## 2019-01-17 DIAGNOSIS — I70.0 AORTO-ILIAC ATHEROSCLEROSIS (H): ICD-10-CM

## 2019-01-17 DIAGNOSIS — I70.8 AORTO-ILIAC ATHEROSCLEROSIS (H): ICD-10-CM

## 2019-01-17 DIAGNOSIS — Z12.11 SPECIAL SCREENING FOR MALIGNANT NEOPLASMS, COLON: Primary | ICD-10-CM

## 2019-01-17 DIAGNOSIS — J44.9 CHRONIC OBSTRUCTIVE PULMONARY DISEASE, UNSPECIFIED COPD TYPE (H): ICD-10-CM

## 2019-01-17 DIAGNOSIS — R30.0 DYSURIA: ICD-10-CM

## 2019-01-17 LAB
ALBUMIN UR-MCNC: 30 MG/DL
APPEARANCE UR: ABNORMAL
BACTERIA #/AREA URNS HPF: ABNORMAL /HPF
BILIRUB UR QL STRIP: ABNORMAL
COLOR UR AUTO: YELLOW
GLUCOSE UR STRIP-MCNC: NEGATIVE MG/DL
HBA1C MFR BLD: 6.6 % (ref 0–5.6)
HGB UR QL STRIP: NEGATIVE
HYALINE CASTS #/AREA URNS LPF: ABNORMAL /LPF
KETONES UR STRIP-MCNC: ABNORMAL MG/DL
LEUKOCYTE ESTERASE UR QL STRIP: NEGATIVE
MUCOUS THREADS #/AREA URNS LPF: PRESENT /LPF
NITRATE UR QL: NEGATIVE
NON-SQ EPI CELLS #/AREA URNS LPF: ABNORMAL /LPF
PH UR STRIP: 5 PH (ref 5–7)
RBC #/AREA URNS AUTO: ABNORMAL /HPF
SOURCE: ABNORMAL
SP GR UR STRIP: >1.03 (ref 1–1.03)
UROBILINOGEN UR STRIP-ACNC: 1 EU/DL (ref 0.2–1)
WBC #/AREA URNS AUTO: ABNORMAL /HPF

## 2019-01-17 PROCEDURE — 81001 URINALYSIS AUTO W/SCOPE: CPT | Performed by: PHYSICIAN ASSISTANT

## 2019-01-17 PROCEDURE — 99214 OFFICE O/P EST MOD 30 MIN: CPT | Performed by: PHYSICIAN ASSISTANT

## 2019-01-17 PROCEDURE — 36415 COLL VENOUS BLD VENIPUNCTURE: CPT | Performed by: PHYSICIAN ASSISTANT

## 2019-01-17 PROCEDURE — 83036 HEMOGLOBIN GLYCOSYLATED A1C: CPT | Performed by: PHYSICIAN ASSISTANT

## 2019-01-17 RX ORDER — ALBUTEROL SULFATE 90 UG/1
2 AEROSOL, METERED RESPIRATORY (INHALATION) EVERY 6 HOURS PRN
Qty: 3 INHALER | Refills: 3 | Status: SHIPPED | OUTPATIENT
Start: 2019-01-17 | End: 2020-05-13

## 2019-01-17 RX ORDER — CILOSTAZOL 50 MG/1
TABLET ORAL
Qty: 180 TABLET | Refills: 1 | Status: SHIPPED | OUTPATIENT
Start: 2019-01-17

## 2019-01-17 RX ORDER — BUDESONIDE AND FORMOTEROL FUMARATE DIHYDRATE 80; 4.5 UG/1; UG/1
2 AEROSOL RESPIRATORY (INHALATION) 2 TIMES DAILY
Qty: 3 INHALER | Refills: 11 | Status: SHIPPED | OUTPATIENT
Start: 2019-01-17 | End: 2019-05-17

## 2019-01-17 RX ORDER — DOXYCYCLINE HYCLATE 100 MG
100 TABLET ORAL 2 TIMES DAILY
Qty: 28 TABLET | Refills: 0 | Status: SHIPPED | OUTPATIENT
Start: 2019-01-17 | End: 2019-02-19

## 2019-01-17 ASSESSMENT — MIFFLIN-ST. JEOR: SCORE: 1952.92

## 2019-01-17 NOTE — PROGRESS NOTES
SUBJECTIVE:   Santos Rajan is a 63 year old male who presents to clinic today for the following health issues:      COPD Follow-Up    Symptoms are currently: slightly worsened    Current fatigue or dyspnea with ambulation: worsened from baseline    Shortness of breath: slightly worsened    Increased or change in Cough/Sputum: Yes-  Increased cough in the mornings    Fever(s): No     . Able to walk up 3 flights of stairs without stopping to rest.    Any ER/UC or hospital admissions since your last visit? No     History   Smoking Status     Current Some Day Smoker     Packs/day: 0.50     Years: 40.00     Start date: 3/1/1973     Last attempt to quit: 8/1/2015   Smokeless Tobacco     Never Used     No results found for: FEV1, IVW7BYC    Amount of exercise or physical activity: None    Problems taking medications regularly: No    Medication side effects: none    Diet: regular (no restrictions)      Genitourinary symptoms      Duration: ongoing    Description:  frequency and retention    Intensity:  moderate    Accompanying signs and symptoms (fever/discharge/nausea/vomiting/back or abdominal pain):  None    History (frequent UTI's/kidney stones/prostate problems): None  Sexually active: YES    Precipitating or alleviating factors: None    Therapies tried and outcome: none   Outcome:       Minimal dysuria.     Problem list and histories reviewed & adjusted, as indicated.  Additional history: as documented    BP Readings from Last 3 Encounters:   01/17/19 128/80   08/07/18 108/73   08/02/18 102/72    Wt Readings from Last 3 Encounters:   01/17/19 115.7 kg (255 lb)   08/07/18 114.8 kg (253 lb)   06/05/18 116.1 kg (256 lb)                    Reviewed and updated as needed this visit by clinical staff  Tobacco  Allergies       Reviewed and updated as needed this visit by Provider         All other systems negative except as outline above  OBJECTIVE:  Eye exam - right eye normal lid, conjunctiva, cornea, pupil and  fundus, left eye normal lid, conjunctiva, cornea, pupil and fundus.  Thyroid not palpable, not enlarged, no nodules detected.  CHEST:chest clear to IPPA, no tachypnea, retractions or cyanosis and S1, S2 normal, no murmur, no gallop, rate regular.  The abdomen is soft without tenderness, guarding, mass, rebound or organomegaly. Bowel sounds are normal. No CVA tenderness or inguinal adenopathy noted.  Patient defers prostate exam.     Santos was seen today for copd and urinary problem.    Diagnoses and all orders for this visit:    Special screening for malignant neoplasms, colon  -     GASTROENTEROLOGY ADULT REF PROCEDURE ONLY [facility] [phone]; No Provider Preference  -     Urine Microscopic    Chronic obstructive pulmonary disease, unspecified COPD type (H)  -     albuterol (PROAIR HFA/PROVENTIL HFA/VENTOLIN HFA) 108 (90 Base) MCG/ACT inhaler; Inhale 2 puffs into the lungs every 6 hours as needed for shortness of breath / dyspnea or wheezing  -     budesonide-formoterol (SYMBICORT) 80-4.5 MCG/ACT Inhaler; Inhale 2 puffs into the lungs 2 times daily  -     COPD ACTION PLAN    Aorto-iliac atherosclerosis (H)  -     cilostazol (PLETAL) 50 MG tablet; TAKE 1 TABLET(50 MG) BY MOUTH TWICE DAILY    Dysuria  -     *UA reflex to Microscopic and Culture ([place] [facility] (except [place] and [place])    Fatigue, unspecified type  -     SLEEP EVALUATION & MANAGEMENT REFERRAL - [facility] [place]  [phone] (Age 15 and up); Future  -     Hemoglobin A1c    Chronic prostatitis  -     doxycycline hyclate (VIBRA-TABS) 100 MG tablet; Take 1 tablet (100 mg) by mouth 2 times daily for 14 days      Advised supportive and symptomatic treatment.  Follow up with Provider - if condition persists or worsens.   work on lifestyle modification

## 2019-01-17 NOTE — LETTER
My COPD Action Plan     Name: Santos Rajan    YOB: 1955   Date: 1/17/2019    My doctor: Roly Tomas PA-C   My clinic: [address]  [place] [id number] [phone]  My Controller Medicine: Formoterol/Budesonide (Symbicort)   Dose: 80-4.5 ---2 puffs 2 times daily     My Rescue Medicine: Albuterol (Proair/Ventolin/Proventil) inhaler   Dose: 2 puffs every 6 hours as needed     My Flare Up Medicine: none   Dose:          Use of Oxygen: Oxygen Not Prescribed      Make sure you've had your pneumonia   vaccines.          GREEN ZONE       Doing well today      Usual level of activity and exercise    Usual amount of cough and mucus    No shortness of breath    Usual level of health (thinking clearly, sleeping well, feel like eating) Actions:      Take daily medicines    Use oxygen as prescribed    Follow regular exercise and diet plan    Avoid cigarette smoke and other irritants that harm the lungs           YELLOW ZONE          Having a bad day or flare up      Short of breath more than usual    A lot more sputum (mucus) than usual    Sputum looks yellow, green, tan, brown or bloody    More coughing or wheezing    Fever or chills    Less energy; trouble completing activities    Trouble thinking or focusing    Using quick relief inhaler or nebulizer more often    Poor sleep; symptoms wake me up    Do not feel like eating Actions:      Get plenty of rest    Take daily medicines    Use quick relief inhaler every 6 hours    If you use oxygen, call you doctor to see if you should adjust your oxygen    Do breathing exercises or other things to help you relax    Let a loved one, friend or neighbor know you are feeling worse    Call your care team if you have 2 or more symptoms.  Start taking steroids or antibiotics if directed by your care team           RED ZONE       Need medical care now      Severe shortness of breath (feel you can't breathe)    Fever,  chills    Not enough breath to do any activity    Trouble coughing up mucus, walking or talking    Blood in mucus    Frequent coughing   Rescue medicines are not working    Not able to sleep because of breathing    Feel confused or drowsy    Chest pain    Actions:      Call your health care team.  If you cannot reach your care team, call 911 or go to the emergency room.        Annual Reminders:  Meet with Care Team, Flu Shot every Fall  Pharmacy:    [id number] - [place], [place] - [address] AT [facility] [address]  [id number] - [place], [place] - [address] [facility]

## 2019-02-19 ENCOUNTER — OFFICE VISIT (OUTPATIENT)
Dept: FAMILY MEDICINE | Facility: CLINIC | Age: 64
End: 2019-02-19
Payer: COMMERCIAL

## 2019-02-19 VITALS
DIASTOLIC BLOOD PRESSURE: 76 MMHG | OXYGEN SATURATION: 90 % | HEIGHT: 70 IN | SYSTOLIC BLOOD PRESSURE: 124 MMHG | TEMPERATURE: 97.1 F | BODY MASS INDEX: 37.71 KG/M2 | HEART RATE: 68 BPM | RESPIRATION RATE: 20 BRPM | WEIGHT: 263.4 LBS

## 2019-02-19 DIAGNOSIS — E11.8 TYPE 2 DIABETES MELLITUS WITH COMPLICATION, WITHOUT LONG-TERM CURRENT USE OF INSULIN (H): ICD-10-CM

## 2019-02-19 DIAGNOSIS — Z72.0 TOBACCO ABUSE: ICD-10-CM

## 2019-02-19 DIAGNOSIS — J06.9 UPPER RESPIRATORY TRACT INFECTION, UNSPECIFIED TYPE: ICD-10-CM

## 2019-02-19 DIAGNOSIS — E66.01 MORBID OBESITY (H): ICD-10-CM

## 2019-02-19 DIAGNOSIS — I71.40 AAA (ABDOMINAL AORTIC ANEURYSM) WITHOUT RUPTURE (H): ICD-10-CM

## 2019-02-19 DIAGNOSIS — J44.1 CHRONIC OBSTRUCTIVE PULMONARY DISEASE WITH ACUTE EXACERBATION (H): Primary | ICD-10-CM

## 2019-02-19 PROBLEM — E11.9 DIABETES MELLITUS, TYPE 2 (H): Status: ACTIVE | Noted: 2019-02-19

## 2019-02-19 LAB
DEPRECATED S PYO AG THROAT QL EIA: NORMAL
FLUAV+FLUBV AG SPEC QL: NEGATIVE
FLUAV+FLUBV AG SPEC QL: NEGATIVE
SPECIMEN SOURCE: NORMAL
SPECIMEN SOURCE: NORMAL

## 2019-02-19 PROCEDURE — 99214 OFFICE O/P EST MOD 30 MIN: CPT | Performed by: NURSE PRACTITIONER

## 2019-02-19 PROCEDURE — 87081 CULTURE SCREEN ONLY: CPT | Performed by: NURSE PRACTITIONER

## 2019-02-19 PROCEDURE — 87880 STREP A ASSAY W/OPTIC: CPT | Performed by: NURSE PRACTITIONER

## 2019-02-19 PROCEDURE — 87804 INFLUENZA ASSAY W/OPTIC: CPT | Performed by: NURSE PRACTITIONER

## 2019-02-19 RX ORDER — AZITHROMYCIN 250 MG/1
TABLET, FILM COATED ORAL
Qty: 6 TABLET | Refills: 0 | Status: SHIPPED | OUTPATIENT
Start: 2019-02-19 | End: 2019-05-01

## 2019-02-19 RX ORDER — PREDNISONE 20 MG/1
40 TABLET ORAL DAILY
Qty: 10 TABLET | Refills: 0 | Status: SHIPPED | OUTPATIENT
Start: 2019-02-19 | End: 2019-05-01

## 2019-02-19 ASSESSMENT — MIFFLIN-ST. JEOR: SCORE: 1992.27

## 2019-02-19 NOTE — PROGRESS NOTES
SUBJECTIVE:  Santos Rajan  is a 63 year old  male  who presents with the following concerns;    Symptom duration:  1 week   Sympom severity:  mod to severe   Treatments tried:  cold pills   Contacts:  wife has a cold, was traveling to [facility]                  Symptoms: Present Comment   Fever/Chills x    Fatigue x    Muscle Aches x    Eye Irritation     Sneezing     Nasal Saurabh/Drg     Sinus Pressure/Pain     Loss of smell     Dental pain     Sore Throat x    Swollen Glands     Ear Pain/Fullness     Cough x Cough spells   Wheeze     Chest Pain     Shortness of breath x    Rash     Other x No appetite   Pt denies known hx of COPD  Pt states he smokes approximately 1 pack a day of cigarettes.     Medications updated and reviewed.  Past, family and surgical history is updated and reviewed in the record.  Patient Active Problem List    Diagnosis Date Noted     Diabetes mellitus, type 2 (H) 02/19/2019     Priority: Medium     Chronic obstructive pulmonary disease with acute exacerbation (H) 02/19/2019     Priority: Medium     Tobacco abuse 02/19/2019     Priority: Medium     Morbid obesity (H) 08/07/2018     Priority: Medium     Health Care Home 05/18/2017     Priority: Medium     *See Letters for [facility] Care Plan: My Access Plan         AAA (abdominal aortic aneurysm) without rupture (H) 04/18/2017     Priority: Medium     Aorto-iliac atherosclerosis (H) 04/18/2017     Priority: Medium     Status post coronary angiogram 03/21/2017     Priority: Medium     Diverticulosis of large intestine without hemorrhage 07/23/2016     Priority: Medium     Benign essential hypertension 06/14/2016     Priority: Medium     Advanced directives, counseling/discussion 09/17/2015     Priority: Medium     Advance Care Planning 9/17/2015: ACP Review and Resources Provided:  Reviewed chart for advance care plan.  Santos Rajan has no plan or code status on file.  available resources provided with information.  code status current choice  pending further ACP discussions.   Added by CHARLA DOUGLAS           Impaired fasting glucose 09/01/2015     Priority: Medium     Low testosterone 09/01/2015     Priority: Medium     Fatty liver 09/01/2015     Priority: Medium     Umbilical hernia 09/01/2015     Priority: Medium     Colon polyps 09/01/2015     Priority: Medium     On Colonoscopy 11/9/2011 - [facility]       CARDIOVASCULAR SCREENING; LDL GOAL LESS THAN 160 08/06/2015     Priority: Medium     Past Medical History:   Diagnosis Date     AAA (abdominal aortic aneurysm) (H)      Aortoiliac occlusive disease (H)      Arthritis 1995    knee and hands     Benign essential hypertension 6/14/2016     Brain tumor (benign) (H)     Being monitored, Lizbeth      Chronic obstructive pulmonary disease with acute exacerbation (H) 2/19/2019     Diabetes mellitus, type 2 (H) 2/19/2019     Diverticula of colon     No surgery      Red blood cell antibody positive     Anti-Charlotte     Umbilical hernia 9/1/2015      Family History   Problem Relation Age of Onset     Cancer Mother         kidney     Other Cancer Mother         kidney     Aneurysm Father      Other Cancer Brother         lieukemia     Cancer Brother         Leukemia      Sleep Apnea Brother      Coronary Artery Disease No family hx of      Diabetes No family hx of      ROS:  General: No change in weight, sleep or appetite.  Normal energy.  POSITIVE for:, felt feverish, not documented, chills, sweats, fatigue, malaise, myalgias  EYES: Negative for vision changes or eye problems  ENT: No problems with ears, nose  No difficulty swallowing., POSITIVE for:, sore throat  Resp: POSITIVE for:, cough-productive- thick and green, dyspnea on exertion, wheezing, SOB/dyspnea, smoking  CV: No chest pains or palpitations      OBJECTIVE:  GENERAL:  Alert, no acute distress  EYES:  PERRL, EOM normal, conjunctiva and lids normal  HEENT:  Ears and TMs normal, oral mucosa and posterior oropharynx normal  RESP:POSITIVE  cough cough observed, diminished breath sounds throughout  CV:  Normal rate, regular rhythm, no murmur or gallop.  MS:  extremities normal, no peripheral edema.    Results:     See orders- r/o influenza and strep with his chronic diseases    Assessment/Plan:     ICD-10-CM    1. Chronic obstructive pulmonary disease with acute exacerbation (H) J44.1 Tobacco Cessation - Order to Satisfy Health Maintenance     azithromycin (ZITHROMAX) 250 MG tablet     predniSONE (DELTASONE) 20 MG tablet   2. Upper respiratory tract infection, unspecified type J06.9 Rapid strep screen     Influenza A/B antigen     Beta strep group A culture   3. Morbid obesity (H) E66.01 STATIN NOT PRESCRIBED (INTENTIONAL)   4. Tobacco abuse Z72.0 Tobacco Cessation - Order to Satisfy Health Maintenance     STATIN NOT PRESCRIBED (INTENTIONAL)   5. Type 2 diabetes mellitus with complication, without long-term current use of insulin (H) E11.8 STATIN NOT PRESCRIBED (INTENTIONAL)   6. AAA (abdominal aortic aneurysm) without rupture (H) I71.4 STATIN NOT PRESCRIBED (INTENTIONAL)      Patient instructions: Make sure that you continue to use your inhalers- this is ongoing treatment for your COPD.  Consider smoking cessation, we can help you if you choose.  We see that you were on a statin in the past, with your diabetes and coronary artery disease, consider restarting.  Take full course of antibiotics and prednisone.  Follow up if persistent or worsening symptoms.       Written by MILLY Crenshaw under the supervision of ADRIANA Can.     Student exam observed as well as completed by provider.  Agree with above documentation. DEANA Matos, ADRIANA-BC

## 2019-02-19 NOTE — PATIENT INSTRUCTIONS
Make sure that you continue to use your inhalers- this is ongoing treatment for your COPD.  Consider smoking cessation, we can help you if you choose.  We see that you were on a statin in the past, with your diabetes and coronary artery disease, consider restarting.  Take full course of antibiotics and prednisone.  Follow up if persistent or worsening symptoms.     HOW TO QUIT SMOKING  Smoking is one of the hardest habits to break. About half of all those who have ever smoked have been able to quit, and most of those (about 70%) who still smoke want to quit. Here are some of the best ways to stop smoking.     KEEP TRYING:  It takes most smokers about 8 tries before they are finally able to fully quit. So, the more often you try and fail, the better your chance of quitting the next time! So, don't give up!    GO COLD TURKEY:  Most ex-smokers quit cold turkey. Trying to cut back gradually doesn't seem to work as well, perhaps because it continues the smoking habit. Also, it is possible to fool yourself by inhaling more while smoking fewer cigarettes. This results in the same amount of nicotine in your body!    GET SUPPORT:  Support programs can make an important difference, especially for the heavy smoker. These groups offer lectures, methods to change your behavior and peer support. Call the free national Quitline for more information. 800-QUIT-NOW (535-711-4528). Low-cost or free programs are offered by many hospitals, local chapters of the American Lung Association (748-389-2372) and the American Cancer Society (459-042-3327). Support at home is important too. Non-smokers can help by offering praise and encouragement. If the smoker fails to quit, encourage them to try again!    OVER-THE-COUNTER MEDICINES:  For those who can't quit on their own, Nicotine Replacement Therapy (NRT) may make quitting much easier. Certain aids such as the nicotine patch, gum and lozenge are available without a prescription. However, it  is best to use these under the guidance of your doctor. The skin patch provides a steady supply of nicotine to the body. Nicotine gum and lozenge gives temporary bursts of low levels of nicotine. Both methods take the edge off the craving for cigarettes. WARNING: If you feel symptoms of nicotine overdose, such as nausea, vomiting, dizziness, weakness, or fast heartbeat, stop using these and see your doctor.    PRESCRIPTION MEDICINES:  After evaluating your smoking patterns and prior attempts at quitting, your doctor may offer a prescription medicine such as bupropion (Zyban, Wellbutrin), varenicline (Chantix, Champix), a niocotine inhaler or nasal spray. Each has its unique advantage and side effects which your doctor can review with you.    HEALTH BENEFITS OF QUITTING:  The benefits of quitting start right away and keep improving the longer you go without smokin minutes: blood pressure and pulse return to normal  8 hours: oxygen levels return to normal  2 days: ability to smell and taste begins to improve as damaged nerves start to regrow  2-3 weeks: circulation and lung function improves  1-9 months: decreased cough, congestion and shortness of breath; less tired  1 year: risk of heart attack decreases by half  5 years: risk of lung cancer decreases by half; risk of stroke becomes the same as a non-smoker  For information about how to quit smoking, visit the following links:  National Cancer [place] ,   Clearing the Air, Quit Smoking Today   - an online booklet. http://www.smokefree.gov/pubs/clearing_the_air.pdf  Smokefree.gov http://smokefree.gov/  QuitNet http://www.quitnet.com/    3632-6317 Dorothy [employer], [address] [place] [address], [place] [address]. All rights reserved. This information is not intended as a substitute for professional medical care. Always follow your healthcare professional's instructions.    The Benefits of Living Smoke Free  What do you want to gain from quitting? Check off some  reasons to quit.  Health Benefits  ___ Reduce my risk of lung cancer, heart disease, chronic lung disease  ___ Have fewer wrinkles and softer skin  ___ Improve my sense of taste and smell  ___ For pregnant women--reduce the risk of having a miscarriage, stillbirth, premature birth, or low-birth-weight baby  Personal Benefits  ___ Feel more in control of my life  ___ Have better-smelling hair, breath, clothes, home, and car  ___ Save time by not having to take smoke breaks, buy cigarettes, or hunt for a light  ___ Have whiter teeth  Family Benefits  ___ Reduce my children s respiratory tract infections  ___ Set a good example for my children  ___ Reduce my family s cancer risk  Financial Benefits  ___ Save hundreds of dollars each year that would be spent on cigarettes  ___ Save money on medical bills  ___ Save on life, health, and car insurance premiums    Those Dollars Add Up!  Cigarettes are expensive, and getting more expensive all the time. Do you realize how much money you are spending on cigarettes per year? What is the average amount you spend on a pack of cigarettes? What is the average number of packs that you smoke per day? Using your answers to these questions, fill in this formula to help you find out:  ($ _____ per pack) ×  ( _____ number of packs per day) × (365 days) =  $ _____ yearly cost of smoking  Besides tobacco, there are other costs, including extra cleaning bills and replacement costs for clothing and furniture; medical expenses for smoking-related illnesses; and higher health, life, and car insurance premiums.    Cigars and Pipes Count Too!  Cigars and pipes are also dangerous. So are smokeless (chewing) tobacco and snuff. All of these products contain nicotine, a highly addictive substance that has harmful effects on your body. Quitting smoking means giving up all tobacco products.      7206-1495 Dorothy Cooley, [address] [place] [address], [place] [address]. All rights reserved. This  information is not intended as a substitute for professional medical care. Always follow your healthcare professional's instructions.  Patient Education     COPD Flare    You have had a flare-up of your COPD.  COPD, or chronic obstructive pulmonary disease, is a common lung disease. It causes your airways to become irritated and narrower. This makes it harder for you to breathe. Emphysema and chronic bronchitis are both types of COPD. This is a chronic condition, which means you always have it. Sometimes it gets worse. When this happens, it is called a flare-up.  Symptoms of COPD  People with COPD may have symptoms most of the time. In a flare-up, your symptoms get worse. These symptoms may mean you are having a flare-up:    Shortness of breath, shallow or rapid breathing, or wheezing that gets worse    Lung infection    Cough that gets worse    More mucus, thicker mucus or mucus of a different color    Tiredness, decreased energy, or trouble doing your usual activities    Fever    Chest tightness    Your symptoms don t get better even when you use your usual medicines, inhalers, and nebulizer    Trouble talking    You feel confused  Causes of flare-ups  Unfortunately, a flare-up can happen even though you did everything right, and you followed your doctor s instructions. Some causes of flare-ups are:    Smoking or secondhand smoke    Colds, the flu, or respiratory infections    Air pollution    Sudden change in the weather    Dust, irritating chemicals, or strong fumes    Not taking your medicines as prescribed  Home care  Here are some things you can do at home to treat a flare-up:    Try not to panic. This makes it harder to breathe, and keeps you from doing the right things.    Don t smoke or be around others who are smoking.    Try to drink more fluids than usual during a flare-up, unless your doctor has told you not to because of heart and kidney problems. More fluids can help loosen the mucus.    Use your  inhalers and nebulizer, if you have one, as you have been told to.    If you were given antibiotics, take them until they are used up or your doctor tells you to stop. It s important to finish the antibiotics, even though you feel better. This will make sure the infection has cleared.    If you were given prednisone or another steroid, finish it even if you feel better.  Preventing a flare-up  Even though flare-ups happen, the best way to treat one is to prevent it before it starts. Here are some pointers:    Don t smoke or be around others who are smoking.    Take your medicines as you have been told.    Talk with your doctor about getting a flu shot every year. Also find out if you need a pneumonia shot.    If there is a weather advisory warning to stay indoors, try to stay inside when possible.    Try to eat healthy and get plenty of sleep.    Try to avoid things that usually set you off, like dust, chemical fumes, hairsprays, or strong perfumes.  Follow-up care  Follow up with your healthcare provider, or as advised.  If a culture was done, you will be told if your treatment needs to be changed. You can call as directed for the results.  If X-rays were done, you will be notified of any new findings that may affect your care.  Call 911  Call 911 if any of these occur:    You have trouble breathing    You feel confused or it s difficult to wake you up    You faint or lose consciousness    You have a rapid heart rate    You have new pain in your chest, arm, shoulder, neck or upper back  When to seek medical advice  Call your healthcare provider right away if any of these occur:    Wheezing or shortness of breath gets worse    You need to use your inhalers more often than usual without relief    Fever of 100.4 F (38 C) or higher, or as directed by your healthcare provider    Coughing up lots of dark-colored or bloody mucus (sputum)    Chest pain with each breath    You do not start to get better within 24  hours    Swelling of your ankles gets worse    Dizziness or weakness  Date Last Reviewed: 9/1/2016 2000-2018 [employer]. [address] [place] [address], [place] [address]. All rights reserved. This information is not intended as a substitute for professional medical care. Always follow your healthcare professional's instructions.           Patient Education     Chronic Lung Disease: Preventing Lung Infections  Chronic lung diseases include chronic obstructive pulmonary disease (COPD), which includes chronic bronchitis and emphysema. Other chronic lung diseases include pulmonary fibrosis, sarcoidosis, and other conditions. When you have chronic lung diseases, it's very important to protect yourself from respiratory infections, like colds, the flu, and lung infections. Infections may cause your lung condition to worsen. Although you can't completely avoid them, there are things you can do to lessen the chance of infections.    Take precautions  Taking the following precautions can help you avoid illness:    Remember to keep your hands away from your nose and mouth. Germs on your hands get into your respiratory system this way.    Wash your hands often. When you wash them:  ? Use soap and warm water.  ? Rub your hands together well for at least 20 seconds.  ? Make sure to rinse them well.  ? Dry your hands on clean towels or air-dry them.    Use hand  containing alcohol, if you are unable to wash your hands. Use the  after touching doorknobs, handles, and supermarket carts, for example, since lots of people touch them. Then wash your hands as soon as you can.    To help prevent the flu, get a flu vaccination every year. This may be given at your healthcare provider's office, a drugstore, or pharmacy, or at work. Get your flu shot as soon as the vaccines are available in your area. This is usually around September each year.    To help prevent pneumococcal pneumonia, get pneumonia  vaccinations. Talk with your healthcare provider about which pneumococcal vaccinations you need.    Try to stay away from people with respiratory infections, such as colds or the flu. Stay away from crowded places, like shopping centers or movie theatres during cold and flu season.    If you smoke, think about quitting. In addition to causing or worsening many lung conditions, the lung damage from smoking increases your risk of infections. Stay away from others who smoke, too. This is also harmful and increases your chance of infections.  Date Last Reviewed: 4/14/2016 2000-2018 [employer]. [address] [place] [address]. All rights reserved. This information is not intended as a substitute for professional medical care. Always follow your healthcare professional's instructions.

## 2019-02-20 LAB
BACTERIA SPEC CULT: NORMAL
SPECIMEN SOURCE: NORMAL

## 2019-05-01 ENCOUNTER — OFFICE VISIT (OUTPATIENT)
Dept: FAMILY MEDICINE | Facility: CLINIC | Age: 64
End: 2019-05-01
Payer: COMMERCIAL

## 2019-05-01 VITALS
SYSTOLIC BLOOD PRESSURE: 132 MMHG | TEMPERATURE: 98.1 F | WEIGHT: 257 LBS | BODY MASS INDEX: 36.79 KG/M2 | HEART RATE: 72 BPM | DIASTOLIC BLOOD PRESSURE: 87 MMHG | RESPIRATION RATE: 16 BRPM | HEIGHT: 70 IN | OXYGEN SATURATION: 93 %

## 2019-05-01 DIAGNOSIS — J01.00 ACUTE NON-RECURRENT MAXILLARY SINUSITIS: ICD-10-CM

## 2019-05-01 DIAGNOSIS — Z71.6 ENCOUNTER FOR SMOKING CESSATION COUNSELING: ICD-10-CM

## 2019-05-01 DIAGNOSIS — J44.1 COPD EXACERBATION (H): Primary | ICD-10-CM

## 2019-05-01 PROCEDURE — 99214 OFFICE O/P EST MOD 30 MIN: CPT | Performed by: PHYSICIAN ASSISTANT

## 2019-05-01 RX ORDER — DOXYCYCLINE HYCLATE 100 MG
100 TABLET ORAL 2 TIMES DAILY
Qty: 20 TABLET | Refills: 0 | Status: SHIPPED | OUTPATIENT
Start: 2019-05-01 | End: 2019-05-17

## 2019-05-01 RX ORDER — PREDNISONE 20 MG/1
20 TABLET ORAL 2 TIMES DAILY
Qty: 14 TABLET | Refills: 0 | Status: SHIPPED | OUTPATIENT
Start: 2019-05-01 | End: 2019-05-17

## 2019-05-01 RX ORDER — VARENICLINE TARTRATE 1 MG/1
1 TABLET, FILM COATED ORAL 2 TIMES DAILY
Qty: 60 TABLET | Refills: 3 | Status: SHIPPED | OUTPATIENT
Start: 2019-05-01

## 2019-05-01 ASSESSMENT — MIFFLIN-ST. JEOR: SCORE: 1961.99

## 2019-05-01 NOTE — PROGRESS NOTES
SUBJECTIVE:   Satnos Rajan is a 63 year old male who presents to clinic today for the following   health issues:      RESPIRATORY SYMPTOMS      Duration: 1 month    Description  nasal congestion, facial pain/pressure and cough    Severity: mild    Accompanying signs and symptoms: None    History (predisposing factors):  none    Precipitating or alleviating factors: None    Therapies tried and outcome:  oral decongestant nasal spray/wash - prednisone with no relief          Additional history: as documented    Reviewed  and updated as needed this visit by clinical staff  Tobacco  Allergies  Meds  Med Hx  Surg Hx  Fam Hx  Soc Hx        Reviewed and updated as needed this visit by Provider         BP Readings from Last 3 Encounters:   05/01/19 132/87   02/19/19 124/76   01/17/19 128/80    Wt Readings from Last 3 Encounters:   05/01/19 116.6 kg (257 lb)   02/19/19 119.5 kg (263 lb 6.4 oz)   01/17/19 115.7 kg (255 lb)                    All other systems negative except as outline above  OBJECTIVE:  ENT exam reveals - bilateral TM fluid noted, neck without nodes, throat normal without erythema or exudate, maxillary sinus tender, post nasal drip noted, nasal mucosa congested and nasal mucosa pale and congested.  CHEST:no tachypnea, retractions or cyanosis, mild expiratory wheezing heard diffusely throughout both lungs, air entry reduced both lower lobes and S1, S2 normal, no murmur, no gallop, rate regular.    Santos was seen today for nasal congestion.    Diagnoses and all orders for this visit:    COPD exacerbation (H)  -     predniSONE (DELTASONE) 20 MG tablet; Take 20 mg by mouth 2 times daily for 7 days.  -     doxycycline hyclate (VIBRA-TABS) 100 MG tablet; Take 1 tablet (100 mg) by mouth 2 times daily for 10 days    Acute non-recurrent maxillary sinusitis  -     predniSONE (DELTASONE) 20 MG tablet; Take 20 mg by mouth 2 times daily for 7 days.  -     doxycycline hyclate (VIBRA-TABS) 100 MG tablet; Take  1 tablet (100 mg) by mouth 2 times daily for 10 days    Encounter for smoking cessation counseling  -     varenicline (CHANTIX CHARLES) 0.5 MG X 11 & 1 MG X 42 tablet; Take 0.5 mg tab daily for 3 days, THEN 0.5 mg tab twice daily for 4 days, THEN 1 mg twice daily.  -     varenicline (CHANTIX) 1 MG tablet; Take 1 tablet (1 mg) by mouth 2 times daily      Advised supportive and symptomatic treatment.  Follow up with Provider - if condition persists or worsens.   work on lifestyle modification  Recheck in 2 mos

## 2019-05-03 ENCOUNTER — TELEPHONE (OUTPATIENT)
Dept: FAMILY MEDICINE | Facility: CLINIC | Age: 64
End: 2019-05-03

## 2019-05-03 NOTE — TELEPHONE ENCOUNTER
Prior Authorization Retail Medication Request    Medication/Dose: varenicline (CHANTIX CHARLES) 0.5 MG X 11 & 1 MG X 42 tablet    ICD code (if different than what is on RX):  Encounter for smoking cessation counseling [Z71.6]     Previously Tried and Failed:    Rationale:      Insurance Name:  [facility] COMPREHENSIVE CARE SERV/BLUE [employer]  Insurance ID:  [id number]       Pharmacy Information (if different than what is on RX)  Name:    Phone:

## 2019-05-09 NOTE — TELEPHONE ENCOUNTER
PA Initiation    Medication: varenicline (CHANTIX CHARLES) 0.5 MG X 11 & 1 MG X 42 tablet  - INITIATED  Insurance Company: [employer] [place] - Phone [phone] Fax [phone]  Pharm[facility]illing the Rx: [facility] [address] [place] [id number] AT [facility] OF  & MAIN  Filling Pharmacy Phone: [phone]  Filling Pharmacy Fax:    Start Date: 5/9/2019

## 2019-05-14 NOTE — TELEPHONE ENCOUNTER
Request was sent to medical INS for review  [phone]  They stated - still in process - will follow up tomorrow

## 2019-05-16 NOTE — TELEPHONE ENCOUNTER
Prior Authorization Not Needed per Insurance    Medication: varenicline (CHANTIX CHARLES) 0.5 MG X 11 & 1 MG X 42 tablet  - not needed  Insurance Company: [employer] [place] - Phone [phone] Fax [phone]  Expected CoPay:      Pharmacy Filling the Rx: [facility] [address] [place] [address] [id number] AT [facility] OF [address]  Pharmacy Notified: No  Patient Notified: Yes  Called INS to check on status - they sent it to a reveiwer at the patient's medical INS coverage - Marva Finn - they only had an email address for rep  Called patient and let them know they should contact their medical INS plan to see what steps they need to do for medication coverage for smoking cessation - they may have a specific plan PT must go through?  PT also stated he will be contacting to clinic to get an appt set up with Dr. Tomas - for an unrelated issue

## 2019-05-17 ENCOUNTER — ANCILLARY PROCEDURE (OUTPATIENT)
Dept: GENERAL RADIOLOGY | Facility: CLINIC | Age: 64
End: 2019-05-17
Attending: NURSE PRACTITIONER
Payer: COMMERCIAL

## 2019-05-17 ENCOUNTER — OFFICE VISIT (OUTPATIENT)
Dept: FAMILY MEDICINE | Facility: CLINIC | Age: 64
End: 2019-05-17
Payer: COMMERCIAL

## 2019-05-17 VITALS
BODY MASS INDEX: 36.88 KG/M2 | WEIGHT: 257 LBS | DIASTOLIC BLOOD PRESSURE: 79 MMHG | HEART RATE: 73 BPM | SYSTOLIC BLOOD PRESSURE: 127 MMHG | RESPIRATION RATE: 20 BRPM | TEMPERATURE: 97.9 F | OXYGEN SATURATION: 95 %

## 2019-05-17 DIAGNOSIS — F17.200 TOBACCO USE DISORDER: ICD-10-CM

## 2019-05-17 DIAGNOSIS — Z09 FOLLOW UP: ICD-10-CM

## 2019-05-17 DIAGNOSIS — R05.3 PERSISTENT COUGH FOR 3 WEEKS OR LONGER: ICD-10-CM

## 2019-05-17 DIAGNOSIS — E11.8 TYPE 2 DIABETES MELLITUS WITH COMPLICATION, WITHOUT LONG-TERM CURRENT USE OF INSULIN (H): ICD-10-CM

## 2019-05-17 DIAGNOSIS — J44.0 CHRONIC OBSTRUCTIVE PULMONARY DISEASE WITH ACUTE LOWER RESPIRATORY INFECTION (H): Primary | ICD-10-CM

## 2019-05-17 DIAGNOSIS — I71.40 AAA (ABDOMINAL AORTIC ANEURYSM) WITHOUT RUPTURE (H): ICD-10-CM

## 2019-05-17 PROCEDURE — 99214 OFFICE O/P EST MOD 30 MIN: CPT | Performed by: NURSE PRACTITIONER

## 2019-05-17 PROCEDURE — 71046 X-RAY EXAM CHEST 2 VIEWS: CPT

## 2019-05-17 RX ORDER — BUDESONIDE AND FORMOTEROL FUMARATE DIHYDRATE 80; 4.5 UG/1; UG/1
2 AEROSOL RESPIRATORY (INHALATION) 2 TIMES DAILY
Qty: 3 INHALER | Refills: 11 | Status: SHIPPED | OUTPATIENT
Start: 2019-05-17 | End: 2019-05-17 | Stop reason: DRUGHIGH

## 2019-05-17 RX ORDER — PREDNISONE 20 MG/1
20 TABLET ORAL DAILY
Qty: 5 TABLET | Refills: 0 | Status: SHIPPED | OUTPATIENT
Start: 2019-05-17 | End: 2019-05-22

## 2019-05-17 RX ORDER — BUDESONIDE AND FORMOTEROL FUMARATE DIHYDRATE 160; 4.5 UG/1; UG/1
2 AEROSOL RESPIRATORY (INHALATION) 2 TIMES DAILY
Qty: 10.2 G | Refills: 11 | Status: SHIPPED | OUTPATIENT
Start: 2019-05-17 | End: 2020-05-13

## 2019-05-17 RX ORDER — LEVOFLOXACIN 750 MG/1
750 TABLET, FILM COATED ORAL DAILY
Qty: 7 TABLET | Refills: 0 | Status: SHIPPED | OUTPATIENT
Start: 2019-05-17 | End: 2019-05-24

## 2019-05-17 NOTE — PATIENT INSTRUCTIONS
Reminders: If you are signed up for [facility] please be aware your results and communications will be sent within your mychart. Please remember to arrive 5-10 minutes early for your appointments. If you are late you may need to reschedule your appointment.      Take full course of antibiotics with daily probiotic.  Use higher dose Symbicort.  Follow-up if your symptoms persist or worsen      Patient Education     COPD Flare    You have had a flare-up of your COPD.  COPD, or chronic obstructive pulmonary disease, is a common lung disease. It causes your airways to become irritated and narrower. This makes it harder for you to breathe. Emphysema and chronic bronchitis are both types of COPD. This is a chronic condition, which means you always have it. Sometimes it gets worse. When this happens, it is called a flare-up.  Symptoms of COPD  People with COPD may have symptoms most of the time. In a flare-up, your symptoms get worse. These symptoms may mean you are having a flare-up:    Shortness of breath, shallow or rapid breathing, or wheezing that gets worse    Lung infection    Cough that gets worse    More mucus, thicker mucus or mucus of a different color    Tiredness, decreased energy, or trouble doing your usual activities    Fever    Chest tightness    Your symptoms don t get better even when you use your usual medicines, inhalers, and nebulizer    Trouble talking    You feel confused  Causes of flare-ups  Unfortunately, a flare-up can happen even though you did everything right, and you followed your doctor s instructions. Some causes of flare-ups are:    Smoking or secondhand smoke    Colds, the flu, or respiratory infections    Air pollution    Sudden change in the weather    Dust, irritating chemicals, or strong fumes    Not taking your medicines as prescribed  Home care  Here are some things you can do at home to treat a flare-up:    Try not to panic. This makes it harder to breathe, and keeps you from doing  the right things.    Don t smoke or be around others who are smoking.    Try to drink more fluids than usual during a flare-up, unless your doctor has told you not to because of heart and kidney problems. More fluids can help loosen the mucus.    Use your inhalers and nebulizer, if you have one, as you have been told to.    If you were given antibiotics, take them until they are used up or your doctor tells you to stop. It s important to finish the antibiotics, even though you feel better. This will make sure the infection has cleared.    If you were given prednisone or another steroid, finish it even if you feel better.  Preventing a flare-up  Even though flare-ups happen, the best way to treat one is to prevent it before it starts. Here are some pointers:    Don t smoke or be around others who are smoking.    Take your medicines as you have been told.    Talk with your doctor about getting a flu shot every year. Also find out if you need a pneumonia shot.    If there is a weather advisory warning to stay indoors, try to stay inside when possible.    Try to eat healthy and get plenty of sleep.    Try to avoid things that usually set you off, like dust, chemical fumes, hairsprays, or strong perfumes.  Follow-up care  Follow up with your healthcare provider, or as advised.  If a culture was done, you will be told if your treatment needs to be changed. You can call as directed for the results.  If X-rays were done, you will be notified of any new findings that may affect your care.  Call 911  Call 911 if any of these occur:    You have trouble breathing    You feel confused or it s difficult to wake you up    You faint or lose consciousness    You have a rapid heart rate    You have new pain in your chest, arm, shoulder, neck or upper back  When to seek medical advice  Call your healthcare provider right away if any of these occur:    Wheezing or shortness of breath gets worse    You need to use your inhalers more  often than usual without relief    Fever of 100.4 F (38 C) or higher, or as directed by your healthcare provider    Coughing up lots of dark-colored or bloody mucus (sputum)    Chest pain with each breath    You do not start to get better within 24 hours    Swelling of your ankles gets worse    Dizziness or weakness  Date Last Reviewed: 9/1/2016 2000-2018 [employer]. [address] [place] [address]. All rights reserved. This information is not intended as a substitute for professional medical care. Always follow your healthcare professional's instructions.           Patient Education     Chronic Lung Disease: Preventing Lung Infections  Chronic lung diseases include chronic obstructive pulmonary disease (COPD), which includes chronic bronchitis and emphysema. Other chronic lung diseases include pulmonary fibrosis, sarcoidosis, and other conditions. When you have chronic lung diseases, it's very important to protect yourself from respiratory infections, like colds, the flu, and lung infections. Infections may cause your lung condition to worsen. Although you can't completely avoid them, there are things you can do to lessen the chance of infections.    Take precautions  Taking the following precautions can help you avoid illness:    Remember to keep your hands away from your nose and mouth. Germs on your hands get into your respiratory system this way.    Wash your hands often. When you wash them:  ? Use soap and warm water.  ? Rub your hands together well for at least 20 seconds.  ? Make sure to rinse them well.  ? Dry your hands on clean towels or air-dry them.    Use hand  containing alcohol, if you are unable to wash your hands. Use the  after touching doorknobs, handles, and supermarket carts, for example, since lots of people touch them. Then wash your hands as soon as you can.    To help prevent the flu, get a flu vaccination every year. This may be given at your  healthcare provider's office, a drugstore, or pharmacy, or at work. Get your flu shot as soon as the vaccines are available in your area. This is usually around September each year.    To help prevent pneumococcal pneumonia, get pneumonia vaccinations. Talk with your healthcare provider about which pneumococcal vaccinations you need.    Try to stay away from people with respiratory infections, such as colds or the flu. Stay away from crowded places, like shopping centers or movie theatres during cold and flu season.    If you smoke, think about quitting. In addition to causing or worsening many lung conditions, the lung damage from smoking increases your risk of infections. Stay away from others who smoke, too. This is also harmful and increases your chance of infections.  Date Last Reviewed: 4/14/2016 2000-2018 [employer]. [address] [place] [address], [place] [address]. All rights reserved. This information is not intended as a substitute for professional medical care. Always follow your healthcare professional's instructions.

## 2019-05-17 NOTE — PROGRESS NOTES
SUBJECTIVE:   Santos Rajan is a 63 year old male who presents to clinic today for the following   health issues:    Following up on: cough/copd exasperation - has had cold pretty much constant since returning from cruise in February.  Was treated by primary with abx and steroids.  Second abx seemed to help but cough still there, productive clear and green sputum. Tobacco user, states wants to quit smoking but is fighting with insurance for coverage of medication. Is leaving for a fishing trip to [place] in one week.     Last visit this was discussed: 5/1/19 with Kailash (x2)     Progression of Symptoms:  improving    Accompanying Signs & Symptoms: cough    Taking Medication as prescribed: yes    Side Effects:  None    Medication Helping Symptoms:  NO-still coughing       Additional history: as documented    Reviewed  and updated as needed this visit by clinical staff  Tobacco  Allergies  Meds  Problems  Med Hx  Surg Hx  Fam Hx  Soc Hx          Reviewed and updated as needed this visit by Provider  Tobacco  Allergies  Meds  Problems  Med Hx  Surg Hx  Fam Hx         Patient Active Problem List   Diagnosis     CARDIOVASCULAR SCREENING; LDL GOAL LESS THAN 160     Impaired fasting glucose     Low testosterone     Fatty liver     Umbilical hernia     Colon polyps     Advanced directives, counseling/discussion     Benign essential hypertension     Diverticulosis of large intestine without hemorrhage     Status post coronary angiogram     AAA (abdominal aortic aneurysm) without rupture (H)     Aorto-iliac atherosclerosis (H)     Health Care Home     Morbid obesity (H)     Diabetes mellitus, type 2 (H)     Chronic obstructive pulmonary disease with acute exacerbation (H)     Tobacco use disorder     Past Surgical History:   Procedure Laterality Date     ABDOMEN SURGERY  1998    Gall bladder     CHOLECYSTECTOMY  1998     COLONOSCOPY  2015     DENTAL SURGERY      implants      ENDOVASCULAR REPAIR ANEURYSM  ABDOMINAL AORTA N/A 4/18/2017    Procedure: ENDOVASCULAR REPAIR ANEURYSM ABDOMINAL AORTA;  Ultra sound guided left/right femoral arterial and left brachial artery access.  Endovascular Abdomnal Aortic  Aneurysm Repair with Endologix AFX2 Bifurcated Endograft system and MORALES   Proximal Endograft System.  Left common and external iliac artery stents using Pearescope Vascular Self-expanding stent system. Express LD iliac/     EYE SURGERY  2013    Lasik     GALLBLADDER SURGERY      gall bladder removal      ORTHOPEDIC SURGERY  1996    Broken jaw & femur       Social History     Tobacco Use     Smoking status: Current Some Day Smoker     Packs/day: 0.50     Years: 40.00     Pack years: 20.00     Start date: 3/1/1973     Last attempt to quit: 8/1/2015     Years since quitting: 3.7     Smokeless tobacco: Never Used   Substance Use Topics     Alcohol use: Yes     Alcohol/week: 0.0 oz     Comment: minimal     Family History   Problem Relation Age of Onset     Cancer Mother         kidney     Other Cancer Mother         kidney     Aneurysm Father      Other Cancer Brother         lieukemia     Cancer Brother         Leukemia      Sleep Apnea Brother      Coronary Artery Disease No family hx of      Diabetes No family hx of          Current Outpatient Medications   Medication Sig Dispense Refill     albuterol (PROAIR HFA/PROVENTIL HFA/VENTOLIN HFA) 108 (90 Base) MCG/ACT inhaler Inhale 2 puffs into the lungs every 6 hours as needed for shortness of breath / dyspnea or wheezing 3 Inhaler 3     amLODIPine-benazepril (LOTREL) 10-40 MG capsule TAKE 1 CAPSULE BY MOUTH DAILY 90 capsule 1     aspirin EC 81 MG EC tablet Take 1 tablet (81 mg) by mouth daily (Patient taking differently: Take 81 mg by mouth every evening ) 60 tablet 3     budesonide-formoterol (SYMBICORT) 160-4.5 MCG/ACT Inhaler Inhale 2 puffs into the lungs 2 times daily 10.2 g 11     cilostazol (PLETAL) 50 MG tablet TAKE 1 TABLET(50 MG) BY MOUTH TWICE DAILY  180 tablet 1     clopidogrel (PLAVIX) 75 MG tablet TAKE 1 TABLET(75 MG) BY MOUTH DAILY 90 tablet 1     levofloxacin (LEVAQUIN) 750 MG tablet Take 1 tablet (750 mg) by mouth daily for 7 days 7 tablet 0     metoprolol succinate (TOPROL-XL) 25 MG 24 hr tablet Take 1 tablet (25 mg) by mouth every evening 90 tablet 3     predniSONE (DELTASONE) 20 MG tablet Take 20 mg by mouth daily for 5 days. 5 tablet 0     varenicline (CHANTIX CHARLES) 0.5 MG X 11 & 1 MG X 42 tablet Take 0.5 mg tab daily for 3 days, THEN 0.5 mg tab twice daily for 4 days, THEN 1 mg twice daily. (Patient not taking: Reported on 5/17/2019) 53 tablet 0     varenicline (CHANTIX) 1 MG tablet Take 1 tablet (1 mg) by mouth 2 times daily (Patient not taking: Reported on 5/17/2019) 60 tablet 3     Allergies   Allergen Reactions     Blood Transfusion Related (Informational Only) Other (See Comments)     Patient has a history of a clinically significant antibody against RBC antigens.  A delay in compatible RBCs may occur.     Codeine      Patient reports mom had severe reaction to codeine     Recent Labs   Lab Test 01/17/19  1058 08/07/18  0805 06/05/18  1421  04/18/17  1602  06/14/16  1247  08/10/15  0901   A1C 6.6*  --  6.1*  --   --   --  5.6   < >  --    LDL  --   --   --   --   --   --  84  --  104   HDL  --   --   --   --   --   --  36*  --  38*   TRIG  --   --   --   --   --   --  110  --  129   ALT  --   --  33  --  19  --  19  --  28   CR  --  0.88 0.82   < > 0.64*   < > 0.77   < > 0.83   GFRESTIMATED  --  87 >90   < > >90  Non  GFR Calc     < > >90  Non  GFR Calc     < > >90  Non  GFR Calc     GFRESTBLACK  --  >90 >90   < > >90  African American GFR Calc     < > >90   GFR Calc     < > >90   GFR Calc     POTASSIUM  --  5.0 4.7   < > 4.2   < > 4.3   < > 4.5   TSH  --  2.35  --   --   --   --  2.12  --  2.16    < > = values in this interval not displayed.      BP Readings from Last  3 Encounters:   05/17/19 127/79   05/01/19 132/87   02/19/19 124/76    Wt Readings from Last 3 Encounters:   05/17/19 116.6 kg (257 lb)   05/01/19 116.6 kg (257 lb)   02/19/19 119.5 kg (263 lb 6.4 oz)                  Labs reviewed in EPIC    ROS:  Constitutional, HEENT, cardiovascular, pulmonary, GI, , musculoskeletal, neuro, skin, endocrine and psych systems are negative, except as otherwise noted.    OBJECTIVE:     /79   Pulse 73   Temp 97.9  F (36.6  C) (Oral)   Resp 20   Wt 116.6 kg (257 lb)   SpO2 95%   BMI 36.88 kg/m    Body mass index is 36.88 kg/m .  GENERAL: healthy, alert and no distress  EYES: Eyes grossly normal to inspection, PERRL and conjunctivae and sclerae normal  HENT: ear canals and TM's normal, nose and mouth without ulcers or lesions  NECK: no adenopathy  RESP: POSITIVE for diminished lung sounds RLL, exp wheeze throughout.   CV: regular rate and rhythm, normal S1 S2, no S3 or S4, no murmur, click or rub, no peripheral edema and peripheral pulses strong  SKIN: no suspicious rashes  PSYCH: mentation appears normal, affect normal/bright    Diagnostic Test Results:  See orders    ASSESSMENT/PLAN:         ICD-10-CM    1. Chronic obstructive pulmonary disease with acute lower respiratory infection (H) J44.0 budesonide-formoterol (SYMBICORT) 160-4.5 MCG/ACT Inhaler     levofloxacin (LEVAQUIN) 750 MG tablet     predniSONE (DELTASONE) 20 MG tablet     DISCONTINUED: budesonide-formoterol (SYMBICORT) 80-4.5 MCG/ACT Inhaler   2. Persistent cough for 3 weeks or longer R05 XR Chest 2 Views     budesonide-formoterol (SYMBICORT) 160-4.5 MCG/ACT Inhaler     levofloxacin (LEVAQUIN) 750 MG tablet     predniSONE (DELTASONE) 20 MG tablet   3. Tobacco use disorder F17.200 XR Chest 2 Views   4. Type 2 diabetes mellitus with complication, without long-term current use of insulin (H) E11.8    5. AAA (abdominal aortic aneurysm) without rupture (H) I71.4    6. Follow up Z09        See Patient Instructions:    Take full course of antibiotics with daily probiotic.  Use higher dose Symbicort.  Follow-up if your symptoms persist or worsen. Risk for tendon rupture discussed, patient wants to try anything to get better before he leaves for [place]    ADRIANA Can  [facility] VONNIE

## 2019-09-28 ENCOUNTER — HEALTH MAINTENANCE LETTER (OUTPATIENT)
Age: 64
End: 2019-09-28

## 2019-12-02 DIAGNOSIS — I70.8 AORTO-ILIAC ATHEROSCLEROSIS (H): ICD-10-CM

## 2019-12-02 DIAGNOSIS — I10 BENIGN ESSENTIAL HYPERTENSION: ICD-10-CM

## 2019-12-02 DIAGNOSIS — I25.83 CORONARY ATHEROSCLEROSIS DUE TO LIPID RICH PLAQUE: ICD-10-CM

## 2019-12-02 DIAGNOSIS — I70.0 AORTO-ILIAC ATHEROSCLEROSIS (H): ICD-10-CM

## 2019-12-02 NOTE — TELEPHONE ENCOUNTER
"Requested Prescriptions   Pending Prescriptions Disp Refills     clopidogrel (PLAVIX) 75 MG tablet [Pharmacy Med Name: CLOPIDOGREL 75MG TABLETS] 90 tablet 0     Sig: TAKE 1 TABLET(75 MG) BY MOUTH DAILY   Last Written Prescription Date:  7-25-19  Last Fill Quantity: 90,  # refills: 0   Last office visit: 5/17/2019 with prescribing provider:  5-17-19   Future Office Visit:      Plavix Failed - 12/2/2019 11:21 AM        Failed - Normal HGB on file in past 12 months     Recent Labs   Lab Test 06/05/18  1421   HGB 16.9               Failed - Normal Platelets on file in past 12 months     Recent Labs   Lab Test 06/05/18  1421                  Passed - No active PPI on record unless is Protonix        Passed - Recent (12 mo) or future (30 days) visit within the authorizing provider's specialty     Patient has had an office visit with the authorizing provider or a provider within the authorizing providers department within the previous 12 mos or has a future within next 30 days. See \"Patient Info\" tab in inbasket, or \"Choose Columns\" in Meds & Orders section of the refill encounter.              Passed - Medication is active on med list        Passed - Patient is age 18 or older        amLODIPine-benazepril (LOTREL) 10-40 MG capsule [Pharmacy Med Name: AMLODIPINE-BENAZ 10/40MG CAPSULES] 90 capsule 0     Sig: TAKE 1 CAPSULE BY MOUTH DAILY   Last Written Prescription Date:  7-25-19  Last Fill Quantity: 90,  # refills: 0   Last office visit: 5/17/2019 with prescribing provider:  5-17-19   Future Office Visit:      Calcium Channel Blockers Protocol  Failed - 12/2/2019 11:21 AM        Failed - Normal serum creatinine on file in past 12 months     Recent Labs   Lab Test 08/07/18  0805   CR 0.88             Passed - Blood pressure under 140/90 in past 12 months     BP Readings from Last 3 Encounters:   05/17/19 127/79   05/01/19 132/87   02/19/19 124/76                 Passed - Recent (12 mo) or future (30 days) visit " "within the authorizing provider's specialty     Patient has had an office visit with the authorizing provider or a provider within the authorizing providers department within the previous 12 mos or has a future within next 30 days. See \"Patient Info\" tab in inbasket, or \"Choose Columns\" in Meds & Orders section of the refill encounter.              Passed - Medication is active on med list        Passed - Patient is age 18 or older        rosuvastatin (CRESTOR) 20 MG tablet [Pharmacy Med Name: ROSUVASTATIN 20MG TABLETS] 90 tablet 0     Sig: TAKE 1 TABLET BY MOUTH DAILY   Last Written Prescription Date:  7-27-19  Last Fill Quantity: 90,  # refills: 0   Last office visit: 5/17/2019 with prescribing provider:  5-17-19   Future Office Visit:      Statins Protocol Failed - 12/2/2019 11:21 AM        Failed - LDL on file in past 12 months     Recent Labs   Lab Test 06/14/16  1247   LDL 84             Passed - No abnormal creatine kinase in past 12 months     No lab results found.             Passed - Recent (12 mo) or future (30 days) visit within the authorizing provider's specialty     Patient has had an office visit with the authorizing provider or a provider within the authorizing providers department within the previous 12 mos or has a future within next 30 days. See \"Patient Info\" tab in inbasket, or \"Choose Columns\" in Meds & Orders section of the refill encounter.              Passed - Medication is active on med list        Passed - Patient is age 18 or older        "

## 2019-12-02 NOTE — LETTER
December 11, 2019        Santos Rajan  [address] [facility] [address]      Dear Santos,    Your medications have been approved for one month only.    However, you are due for a follow up appointment for further refills. Please schedule this visit at your earliest convenience.    Thank you.      Roly Tomas PA-C/shani

## 2019-12-04 NOTE — TELEPHONE ENCOUNTER
Routing refill request to provider for review/approval because:  Failed Protocol    Courtney given for Crestor x 1.     Last OV 5/3/19 with plan to follow up in 2 months, however patient has not done so.     Last written 1/3/19 (6 month supply) &  7/27/19 (3 month supply) .     Pended for 30 day supplies with appointment reminders.    Alejandra Lindsey RN BSN

## 2019-12-05 RX ORDER — CLOPIDOGREL BISULFATE 75 MG/1
TABLET ORAL
Qty: 30 TABLET | Refills: 0 | Status: SHIPPED | OUTPATIENT
Start: 2019-12-05

## 2019-12-05 RX ORDER — AMLODIPINE AND BENAZEPRIL HYDROCHLORIDE 10; 40 MG/1; MG/1
1 CAPSULE ORAL DAILY
Qty: 30 CAPSULE | Refills: 0 | Status: SHIPPED | OUTPATIENT
Start: 2019-12-05

## 2019-12-05 RX ORDER — ROSUVASTATIN CALCIUM 20 MG/1
TABLET, COATED ORAL
Qty: 30 TABLET | Refills: 0 | Status: SHIPPED | OUTPATIENT
Start: 2019-12-05

## 2020-01-27 ENCOUNTER — TELEPHONE (OUTPATIENT)
Dept: FAMILY MEDICINE | Facility: CLINIC | Age: 65
End: 2020-01-27

## 2020-01-27 NOTE — TELEPHONE ENCOUNTER
Type of outreach:    Sent OpenDrive message.    Patient is due/failing the followin. Your next office visit is due for Medicare Annual Wellness visit  2. Follow up office visit on diabetes, blood pressure, and COPD  3. Fasting/Non-Fasting labs- microalbumin, cholesterol, comprehensive panel, a1c, basic panel  4. Eye exam-recommended yearly for diabetics. Please let us know if you have completed this or need a referral.  5. Colonoscopy  6. Vaccines-pneumonia, flu and shingles (please verify with insurance regarding coverage)

## 2020-01-27 NOTE — TELEPHONE ENCOUNTER
Panel Management Review  Summary:    Patient is due/failing the following:   PHYSICAL  Health Maintenance Due   Topic Date Due     MICROALBUMIN  1955     DIABETIC FOOT EXAM  1955     EYE EXAM  1955     HIV SCREENING  06/07/1969     LIPID  06/14/2017     COLONOSCOPY  09/01/2018     CMP  06/05/2019     MEDICARE ANNUAL WELLNESS VISIT  06/07/2019     FALL RISK ASSESSMENT  06/07/2019     PNEUMOCOCCAL IMMUNIZATION 65+ LOW/MEDIUM RISK (2 of 2 - PPSV23) 06/07/2019     A1C  07/17/2019     BMP  08/07/2019     INFLUENZA VACCINE (1) 09/01/2019     PHQ-2  01/01/2020     ZOSTER IMMUNIZATION (2 of 2) 03/14/2019     TOBACCO CESSATION COUNSELING  02/19/2020        Type of outreach:    Please assist patient with scheduling.                                                                                                                    Manda Mcelroy NP    Chart routed to Care Team .

## 2020-03-15 ENCOUNTER — HEALTH MAINTENANCE LETTER (OUTPATIENT)
Age: 65
End: 2020-03-15

## 2020-05-09 DIAGNOSIS — R05.3 PERSISTENT COUGH FOR 3 WEEKS OR LONGER: ICD-10-CM

## 2020-05-09 DIAGNOSIS — J44.9 CHRONIC OBSTRUCTIVE PULMONARY DISEASE, UNSPECIFIED COPD TYPE (H): ICD-10-CM

## 2020-05-09 DIAGNOSIS — J44.0 CHRONIC OBSTRUCTIVE PULMONARY DISEASE WITH ACUTE LOWER RESPIRATORY INFECTION (H): ICD-10-CM

## 2020-05-12 RX ORDER — BUDESONIDE AND FORMOTEROL FUMARATE DIHYDRATE 80; 4.5 UG/1; UG/1
AEROSOL RESPIRATORY (INHALATION)
Qty: 30.6 G | OUTPATIENT
Start: 2020-05-12

## 2020-05-12 NOTE — TELEPHONE ENCOUNTER
Routing refill request to provider for review/approval because:  Needs provider approval. Previous smaller dose of Symbicort was requested, this was refused and current dose on med list pended for approval.  Shawna Painting RN

## 2020-05-13 RX ORDER — BUDESONIDE AND FORMOTEROL FUMARATE DIHYDRATE 160; 4.5 UG/1; UG/1
2 AEROSOL RESPIRATORY (INHALATION) 2 TIMES DAILY
Qty: 10.2 G | Refills: 0 | Status: SHIPPED | OUTPATIENT
Start: 2020-05-13

## 2020-05-13 RX ORDER — ALBUTEROL SULFATE 90 UG/1
AEROSOL, METERED RESPIRATORY (INHALATION)
Qty: 1 INHALER | Refills: 0 | Status: SHIPPED | OUTPATIENT
Start: 2020-05-13

## 2020-05-13 NOTE — TELEPHONE ENCOUNTER
[facility] and [facility] them [facility] through [facility]. Declined a visit at this time.

## 2020-05-13 NOTE — TELEPHONE ENCOUNTER
amelie is due for a visit with me. Schedule him for a virtual (video or phone based on patient preference. Always promote a video visit first) visit with me.

## 2021-01-10 ENCOUNTER — HEALTH MAINTENANCE LETTER (OUTPATIENT)
Age: 66
End: 2021-01-10

## 2021-03-18 ENCOUNTER — TRANSCRIBE ORDERS (OUTPATIENT)
Dept: OTHER | Age: 66
End: 2021-03-18

## 2021-03-18 DIAGNOSIS — J44.9 CHRONIC OBSTRUCTIVE PULMONARY DISEASE, UNSPECIFIED COPD TYPE (H): Primary | ICD-10-CM

## 2021-04-08 ENCOUNTER — HOSPITAL ENCOUNTER (OUTPATIENT)
Dept: CARDIAC REHAB | Facility: CLINIC | Age: 66
End: 2021-04-08
Attending: FAMILY MEDICINE
Payer: COMMERCIAL

## 2021-04-08 DIAGNOSIS — J44.9 CHRONIC OBSTRUCTIVE PULMONARY DISEASE, UNSPECIFIED COPD TYPE (H): ICD-10-CM

## 2021-04-08 PROCEDURE — G0237 THERAPEUTIC PROCD STRG ENDUR: HCPCS

## 2021-04-08 PROCEDURE — G0238 OTH RESP PROC, INDIV: HCPCS

## 2021-04-13 ENCOUNTER — HOSPITAL ENCOUNTER (OUTPATIENT)
Dept: CARDIAC REHAB | Facility: CLINIC | Age: 66
End: 2021-04-13
Attending: FAMILY MEDICINE
Payer: COMMERCIAL

## 2021-04-13 PROCEDURE — G0424 PULMONARY REHAB W EXER: HCPCS

## 2021-04-15 ENCOUNTER — HOSPITAL ENCOUNTER (OUTPATIENT)
Dept: CARDIAC REHAB | Facility: CLINIC | Age: 66
End: 2021-04-15
Attending: FAMILY MEDICINE
Payer: COMMERCIAL

## 2021-04-15 PROCEDURE — G0424 PULMONARY REHAB W EXER: HCPCS

## 2021-04-22 ENCOUNTER — HOSPITAL ENCOUNTER (OUTPATIENT)
Dept: CARDIAC REHAB | Facility: CLINIC | Age: 66
End: 2021-04-22
Attending: FAMILY MEDICINE
Payer: COMMERCIAL

## 2021-04-22 PROCEDURE — G0424 PULMONARY REHAB W EXER: HCPCS | Performed by: REHABILITATION PRACTITIONER

## 2021-04-29 ENCOUNTER — HOSPITAL ENCOUNTER (OUTPATIENT)
Dept: CARDIAC REHAB | Facility: CLINIC | Age: 66
End: 2021-04-29
Attending: FAMILY MEDICINE
Payer: COMMERCIAL

## 2021-04-29 PROCEDURE — G0424 PULMONARY REHAB W EXER: HCPCS | Performed by: REHABILITATION PRACTITIONER

## 2021-05-08 ENCOUNTER — HEALTH MAINTENANCE LETTER (OUTPATIENT)
Age: 66
End: 2021-05-08

## 2021-05-11 ENCOUNTER — MEDICAL CORRESPONDENCE (OUTPATIENT)
Dept: CARDIAC REHAB | Facility: CLINIC | Age: 66
End: 2021-05-11

## 2021-06-10 ENCOUNTER — MEDICAL CORRESPONDENCE (OUTPATIENT)
Dept: CARDIAC REHAB | Facility: CLINIC | Age: 66
End: 2021-06-10

## 2021-08-20 ENCOUNTER — TRANSCRIBE ORDERS (OUTPATIENT)
Dept: OTHER | Age: 66
End: 2021-08-20

## 2021-08-20 DIAGNOSIS — I25.10 ATHEROSCLEROTIC HEART DISEASE OF NATIVE CORONARY ARTERY WITHOUT ANGINA PECTORIS: Primary | ICD-10-CM

## 2021-08-28 ENCOUNTER — HEALTH MAINTENANCE LETTER (OUTPATIENT)
Age: 66
End: 2021-08-28

## 2021-10-23 ENCOUNTER — HEALTH MAINTENANCE LETTER (OUTPATIENT)
Age: 66
End: 2021-10-23

## 2021-10-26 ENCOUNTER — HOSPITAL ENCOUNTER (OUTPATIENT)
Dept: CARDIAC REHAB | Facility: CLINIC | Age: 66
End: 2021-10-26
Attending: NURSE PRACTITIONER
Payer: COMMERCIAL

## 2021-10-26 DIAGNOSIS — I25.10 ATHEROSCLEROTIC HEART DISEASE OF NATIVE CORONARY ARTERY WITHOUT ANGINA PECTORIS: ICD-10-CM

## 2021-10-26 PROCEDURE — 93797 PHYS/QHP OP CAR RHAB WO ECG: CPT | Mod: 59

## 2021-10-26 PROCEDURE — 93798 PHYS/QHP OP CAR RHAB W/ECG: CPT

## 2021-11-01 ENCOUNTER — HOSPITAL ENCOUNTER (OUTPATIENT)
Dept: CARDIAC REHAB | Facility: CLINIC | Age: 66
End: 2021-11-01
Attending: NURSE PRACTITIONER
Payer: COMMERCIAL

## 2021-11-01 PROCEDURE — 93798 PHYS/QHP OP CAR RHAB W/ECG: CPT

## 2021-11-03 ENCOUNTER — HOSPITAL ENCOUNTER (OUTPATIENT)
Dept: CARDIAC REHAB | Facility: CLINIC | Age: 66
End: 2021-11-03
Attending: NURSE PRACTITIONER
Payer: COMMERCIAL

## 2021-11-03 PROCEDURE — 93798 PHYS/QHP OP CAR RHAB W/ECG: CPT

## 2021-11-08 ENCOUNTER — HOSPITAL ENCOUNTER (OUTPATIENT)
Dept: CARDIAC REHAB | Facility: CLINIC | Age: 66
End: 2021-11-08
Attending: NURSE PRACTITIONER
Payer: COMMERCIAL

## 2021-11-08 PROCEDURE — 93798 PHYS/QHP OP CAR RHAB W/ECG: CPT

## 2021-11-10 ENCOUNTER — HOSPITAL ENCOUNTER (OUTPATIENT)
Dept: CARDIAC REHAB | Facility: CLINIC | Age: 66
End: 2021-11-10
Attending: NURSE PRACTITIONER
Payer: COMMERCIAL

## 2021-11-10 PROCEDURE — 93798 PHYS/QHP OP CAR RHAB W/ECG: CPT

## 2021-11-15 ENCOUNTER — HOSPITAL ENCOUNTER (OUTPATIENT)
Dept: CARDIAC REHAB | Facility: CLINIC | Age: 66
End: 2021-11-15
Attending: NURSE PRACTITIONER
Payer: COMMERCIAL

## 2021-11-15 PROCEDURE — 93798 PHYS/QHP OP CAR RHAB W/ECG: CPT

## 2021-11-17 ENCOUNTER — HOSPITAL ENCOUNTER (OUTPATIENT)
Dept: CARDIAC REHAB | Facility: CLINIC | Age: 66
End: 2021-11-17
Attending: NURSE PRACTITIONER
Payer: COMMERCIAL

## 2021-11-17 PROCEDURE — 93798 PHYS/QHP OP CAR RHAB W/ECG: CPT

## 2021-11-22 ENCOUNTER — HOSPITAL ENCOUNTER (OUTPATIENT)
Dept: CARDIAC REHAB | Facility: CLINIC | Age: 66
End: 2021-11-22
Attending: NURSE PRACTITIONER
Payer: COMMERCIAL

## 2021-11-22 PROCEDURE — 93798 PHYS/QHP OP CAR RHAB W/ECG: CPT

## 2022-01-03 ENCOUNTER — HOSPITAL ENCOUNTER (OUTPATIENT)
Dept: CARDIAC REHAB | Facility: CLINIC | Age: 67
End: 2022-01-03
Attending: NURSE PRACTITIONER
Payer: COMMERCIAL

## 2022-01-03 PROCEDURE — 93798 PHYS/QHP OP CAR RHAB W/ECG: CPT | Performed by: CLINICAL EXERCISE PHYSIOLOGIST

## 2022-06-28 NOTE — PROGRESS NOTES
Cardiac Rehab Discharge Summary    Reason for discharge:    Patient/family request discontinuation of services. Pt cancelled all sessions due to back injury.    Progress towards goals:  Goals not met.  Barriers to achieving goals:   limited tolerance for therapy.    Recommendation(s):    Continue home exercise program.

## 2022-10-28 NOTE — PROGRESS NOTES
SUBJECTIVE:                                                    Santos Rajan is a 62 year old male who presents to clinic today for the following health issues:      Joint Pain     Onset: after vascular surgery within the past 6 weeks    Description:   Location: bilateral legs  Character: throbbing usually when sitting    Intensity: moderate    Progression of Symptoms: intermittent    Accompanying Signs & Symptoms:  Other symptoms: none   History:   Previous similar pain: no       Precipitating factors:   Trauma or overuse: YES- noted after surgery    Alleviating factors:  Improved by: walking       Therapies Tried and outcome: none      Off and on stabbing pains in both anterolateral quads. No numbness or tingling.  No low back pain. No poikilothermic changes in the legs. Symptoms present more with sitting, but really at anytime. Notes a mild right thigh ache currently.      Problem list and histories reviewed & adjusted, as indicated.  Additional history: as documented        Reviewed and updated as needed this visit by clinical staff  Tobacco  Meds  Med Hx  Surg Hx  Fam Hx  Soc Hx      Reviewed and updated as needed this visit by Provider         All other systems negative except as outline above  OBJECTIVE:  BACK: Lumbosacral spine area reveals local tenderness.  Painful and reduced LS ROM noted. Straight leg raise is negative.  DTR's, motor strength and sensation normal, including heel and toe gait.  Perifpheral pulses are palpable.  Hipes and knees have full range of motion without pain.  No abdominal tenderness, mass or organomegaly.  Scar tissue in inguinal area bilaterally  Mild leg edema  Cms to lower extremities normal.   Knees reveal full range of motion, no tenderness, masses, effusion or ligamentous instability.    Santos was seen today for musculoskeletal problem.    Diagnoses and all orders for this visit:    Meralgia paresthetica of both lower extremities  -     predniSONE (DELTASONE) 10  MG tablet; Take 60 mg days 1 and 2, 50 mg days 3 and 4, 40 mg days 5 and 6, 30 mg days 7 and 8, 20 mg days 9 and 10, 10 mg days 11 and 12    Lumbar disc disorder  -     gabapentin (NEURONTIN) 100 MG capsule; Take 1 capsule (100 mg) by mouth 3 times daily    Aorto-iliac atherosclerosis (H)  -     clopidogrel (PLAVIX) 75 MG tablet; Take 1 tablet (75 mg) by mouth daily  -     cilostazol (PLETAL) 50 MG tablet; Take 1 tablet (50 mg) by mouth 2 times daily  -     rosuvastatin (CRESTOR) 10 MG tablet; Take 1 tablet (10 mg) by mouth At Bedtime    Benign essential hypertension  -     amLODIPine-benazepril (LOTREL) 10-40 MG per capsule; Take 1 capsule by mouth daily    Pain in both thighs  -     predniSONE (DELTASONE) 10 MG tablet; Take 60 mg days 1 and 2, 50 mg days 3 and 4, 40 mg days 5 and 6, 30 mg days 7 and 8, 20 mg days 9 and 10, 10 mg days 11 and 12  -     US Lower Extremity Venous Duplex Bilateral; Future      Advised supportive and symptomatic treatment.  Follow up with Provider - if condition persists or worsens.      adult consistency food

## 2024-06-17 PROBLEM — Z76.89 HEALTH CARE HOME: Status: RESOLVED | Noted: 2017-05-18 | Resolved: 2024-06-17

## 2024-10-20 NOTE — TELEPHONE ENCOUNTER
[facility] Call Center    Phone Message    Name of Caller: EROS SUTTON    Phone Number: Cell number on file:    Telephone Information:   Mobile [phone]       Best time to return call: TODAY.  Pt would like an MRI order (per last visit discussion with Dr Alva).  Patient has lower back/hip pain and would like a call back with information for an MRI as soon as possible. Thanks.     May a detailed message be left on voicemail: yes    Relation to patient: Self    Reason for Call: Other: Please call patient with MRI ok and appt.      Action Taken: Message routed to:  Adult Clinics: Sports Medicine p [id number]     20-Oct-2024 19:48

## 2024-12-12 NOTE — MR AVS SNAPSHOT
After Visit Summary   11/3/2017    Santos Rajan    MRN: [id number]           Patient Information     Date Of Birth          1955        Visit Information        Provider Department      11/3/2017 10:00 AM Roly Tomas PA-C [facility]        Today's Diagnoses     Acute bronchitis, unspecified organism    -  1    Benign essential hypertension        Aorto-iliac atherosclerosis (H)        Coronary atherosclerosis due to lipid rich plaque        Hip pain, left        Immunization due        Tobacco use disorder           Follow-ups after your visit        Who to contact     Normal or non-critical lab and imaging results will be communicated to you by [employer], letter or phone within 4 business days after the clinic has received the results. If you do not hear from us within 7 days, please contact the clinic through [employer] or phone. If you have a critical or abnormal lab result, we will notify you by phone as soon as possible.  Submit refill requests through [employer] or call your pharmacy and they will forward the refill request to us. Please allow 3 business days for your refill to be completed.          If you need to speak with a  for additional information , please call: [phone]             Additional Information About Your Visit        MyChart Information     [employer] gives you secure access to your electronic health record. If you see a primary care provider, you can also send messages to your care team and make appointments. If you have questions, please call your primary care clinic.  If you do not have a primary care provider, please call [phone] and they will assist you.        Care EveryWhere ID     This is your Care EveryWhere ID. This could be used by other organizations to access your [place] medical records  [id number]        Your Vitals Were     Pulse Temperature Height Pulse Oximetry BMI (Body Mass Index)       77 97.4  F (36.3  C)  Detail Level: Detailed "(Tympanic) 5' 10\" (1.778 m) 97% 35.5 kg/m2        Blood Pressure from Last 3 Encounters:   11/03/17 115/76   08/03/17 132/81   07/13/17 130/88    Weight from Last 3 Encounters:   11/03/17 247 lb 6.4 oz (112.2 kg)   08/03/17 242 lb 6.4 oz (110 kg)   07/13/17 250 lb (113.4 kg)              We Performed the Following     PNEUMOCOCCAL CONJ VACCINE 13 VALENT IM          Today's Medication Changes          These changes are accurate as of: 11/3/17 10:35 AM.  If you have any questions, ask your nurse or doctor.               Start taking these medicines.        Dose/Directions    albuterol 108 (90 BASE) MCG/ACT Inhaler   Commonly known as:  PROAIR HFA/PROVENTIL HFA/VENTOLIN HFA   Used for:  Acute bronchitis, unspecified organism   Started by:  Roly Tomas PA-C        Dose:  2 puff   Inhale 2 puffs into the lungs every 6 hours as needed for shortness of breath / dyspnea or wheezing   Quantity:  1 Inhaler   Refills:  1       azithromycin 250 MG tablet   Commonly known as:  ZITHROMAX   Used for:  Acute bronchitis, unspecified organism   Started by:  Roly Tomas PA-C        Two tablets first day, then one tablet daily for four days.   Quantity:  6 tablet   Refills:  0       predniSONE 20 MG tablet   Commonly known as:  DELTASONE   Used for:  Acute bronchitis, unspecified organism   Started by:  Roly oTmas PA-C        Dose:  40 mg   Take 2 tablets (40 mg) by mouth daily for 5 days   Quantity:  10 tablet   Refills:  0       * varenicline 1 MG tablet   Commonly known as:  CHANTIX   Used for:  Tobacco use disorder   Started by:  Roly Tomas PA-C        Dose:  1 mg   Take 1 tablet (1 mg) by mouth 2 times daily   Quantity:  56 tablet   Refills:  2       * varenicline 0.5 MG X 11 & 1 MG X 42 tablet   Commonly known as:  CHANTIX STARTING MONTH PAK   Used for:  Tobacco use disorder   Started by:  Roly Tomas PA-C        Take 0.5 mg tab daily for 3 days, then 0.5 mg tab twice daily for 4 days, then 1 " mg twice daily.   Quantity:  53 tablet   Refills:  0       * Notice:  This list has 2 medication(s) that are the same as other medications prescribed for you. Read the directions carefully, and ask your doctor or other care provider to review them with you.      These medicines have changed or have updated prescriptions.        Dose/Directions    aspirin 81 MG EC tablet   This may have changed:  when to take this   Used for:  Coronary atherosclerosis due to lipid rich plaque        Dose:  81 mg   Take 1 tablet (81 mg) by mouth daily   Quantity:  60 tablet   Refills:  3       cilostazol 50 MG tablet   Commonly known as:  PLETAL   This may have changed:  See the new instructions.   Used for:  Aorto-iliac atherosclerosis (H)   Changed by:  Roly Tomas PA-C        TAKE 1 TABLET(50 MG) BY MOUTH TWICE DAILY   Quantity:  180 tablet   Refills:  0       rosuvastatin 20 MG tablet   Commonly known as:  CRESTOR   This may have changed:  when to take this   Used for:  Coronary atherosclerosis due to lipid rich plaque   Changed by:  Roly Tomas PA-C        Dose:  20 mg   Take 1 tablet (20 mg) by mouth daily   Quantity:  90 tablet   Refills:  3            Where to get your medicines      These medications were sent to [facility] [address] [place], [place] - [address] [facility] [address] [facility] [address]  [address] [facility], [facility] [address]     Phone:  [phone]     albuterol 108 (90 BASE) MCG/ACT Inhaler    amLODIPine-benazepril 10-40 MG per capsule    azithromycin 250 MG tablet    cilostazol 50 MG tablet    clopidogrel 75 MG tablet    metoprolol 25 MG 24 hr tablet    predniSONE 20 MG tablet    rosuvastatin 20 MG tablet    varenicline 0.5 MG X 11 & 1 MG X 42 tablet    varenicline 1 MG tablet         Some of these will need a paper prescription and others can be bought over the counter.  Ask your nurse if you have questions.     Bring a paper prescription for each of these medications     traMADol 50 MG  tablet                Primary Care Provider Office Phone # Fax #    Roly Tomas PA-C [phone]       [address] [facility] [address]        Equal Access to Services     BISMARK ALVAREZ : Hadii aad ku hadmonicao Soomaali, waaxda luqadaha, qaybta kaalmada adeegyada, waxcollin idiin erneston jw gan laHenryeddi wild. So [facility] [phone].    ATENCIÓN: Si habla español, tiene a hernandez disposición servicios gratuitos de asistencia lingüística. [facility] [phone].    We comply with applicable federal civil rights laws and [place] laws. We do not discriminate on the basis of race, color, national origin, age, disability, sex, sexual orientation, or gender identity.            Thank you!     Thank you for choosing [facility]  for your care. Our goal is always to provide you with excellent care. Hearing back from our patients is one way we can continue to improve our services. Please take a few minutes to complete the written survey that you may receive in the mail after your visit with us. Thank you!             Your Updated Medication List - Protect others around you: Learn how to safely use, store and throw away your medicines at www.disposemymeds.org.          This list is accurate as of: 11/3/17 10:35 AM.  Always use your most recent med list.                   Brand Name Dispense Instructions for use Diagnosis    albuterol 108 (90 BASE) MCG/ACT Inhaler    PROAIR HFA/PROVENTIL HFA/VENTOLIN HFA    1 Inhaler    Inhale 2 puffs into the lungs every 6 hours as needed for shortness of breath / dyspnea or wheezing    Acute bronchitis, unspecified organism       amLODIPine-benazepril 10-40 MG per capsule    LOTREL    90 capsule    Take 1 capsule by mouth daily    Benign essential hypertension       aspirin 81 MG EC tablet     60 tablet    Take 1 tablet (81 mg) by mouth daily    Coronary atherosclerosis due to lipid rich plaque       azithromycin 250 MG tablet    ZITHROMAX    6 tablet    Two tablets  first day, then one tablet daily for four days.    Acute bronchitis, unspecified organism       cilostazol 50 MG tablet    PLETAL    180 tablet    TAKE 1 TABLET(50 MG) BY MOUTH TWICE DAILY    Aorto-iliac atherosclerosis (H)       clopidogrel 75 MG tablet    PLAVIX    90 tablet    Take 1 tablet (75 mg) by mouth daily    Aorto-iliac atherosclerosis (H)       metoprolol 25 MG 24 hr tablet    TOPROL-XL    90 tablet    Take 1 tablet (25 mg) by mouth every evening    Coronary atherosclerosis due to lipid rich plaque       predniSONE 20 MG tablet    DELTASONE    10 tablet    Take 2 tablets (40 mg) by mouth daily for 5 days    Acute bronchitis, unspecified organism       rosuvastatin 20 MG tablet    CRESTOR    90 tablet    Take 1 tablet (20 mg) by mouth daily    Coronary atherosclerosis due to lipid rich plaque       traMADol 50 MG tablet    ULTRAM    30 tablet    Take 1 tablet (50 mg) by mouth every 6 hours as needed for pain    Hip pain, left       * varenicline 1 MG tablet    CHANTIX    56 tablet    Take 1 tablet (1 mg) by mouth 2 times daily    Tobacco use disorder       * varenicline 0.5 MG X 11 & 1 MG X 42 tablet    CHANTIX STARTING MONTH CHARLES    53 tablet    Take 0.5 mg tab daily for 3 days, then 0.5 mg tab twice daily for 4 days, then 1 mg twice daily.    Tobacco use disorder       * Notice:  This list has 2 medication(s) that are the same as other medications prescribed for you. Read the directions carefully, and ask your doctor or other care provider to review them with you.       Detail Level: Zone Detail Level: Simple

## (undated) DEVICE — SU SILK 0 TIE 6X30" A306H

## (undated) DEVICE — SU VICRYL 3-0 SH 27" J316H

## (undated) DEVICE — PREP CHLORHEXIDINE 4% 4OZ (HIBICLENS) 57504

## (undated) DEVICE — Device

## (undated) DEVICE — LINEN TOWEL PACK X5 5464

## (undated) DEVICE — SU VICRYL 2-0 CT-1 27" UND J259H

## (undated) DEVICE — DRAPE CV SPLIT II 147X106" 9158

## (undated) DEVICE — BNDG COBAN 2"X5YDS UNSTERILE 2082

## (undated) DEVICE — WIRE GUIDE LUNDERQUIST 0.035"X260CM DBL CVD

## (undated) DEVICE — INFLATION DEVICE ENCORE  26 PRESSURE GAUGE M001151050

## (undated) DEVICE — DRAPE SHEET HALF 40X60" 9358

## (undated) DEVICE — DRSG TELFA 3X8" 1238

## (undated) DEVICE — DRAPE U SPLIT 74X120" 29440

## (undated) DEVICE — BOWL 32OZ STERILE 01232

## (undated) DEVICE — GUIDEWIRE TERUMO .035X260 3CM STR TIP GS3504

## (undated) DEVICE — SU MONOCRYL 4-0 PS-2 27" UND Y426H

## (undated) DEVICE — CLOSURE DEVICE 6FR VASC PROGLIDE MEDICATED SUTURE 12673-03

## (undated) DEVICE — CAST FIBERGLASS 3" ROLL WHITE 73458-00002-00

## (undated) DEVICE — DRSG PREVENA NEGATIVE PRESSURE 20CM WND PRE1001US

## (undated) DEVICE — DECANTER BAG 2002S

## (undated) DEVICE — CATH ANGIO MARINER KUMPE 5FRX100CM 11732703

## (undated) DEVICE — INTRO SHEATH BRITE TIP 6FRX11CM 401-611M

## (undated) DEVICE — APPLICATOR COTTON TIP 6"X2 STERILE LF 6012

## (undated) DEVICE — SU VICRYL 2-0 SH 27" UND J417H

## (undated) DEVICE — DRSG TEGADERM 4X4 3/4" 1626W

## (undated) DEVICE — DRSG KERLIX 4 1/2"X4YDS ROLL 6715

## (undated) DEVICE — SU SILK 2-0 TIE 12X30" A305H

## (undated) DEVICE — COVER EASY EQUIP BAG W/BAND LATEX FREE EZ-28

## (undated) DEVICE — SU DERMABOND ADVANCED .7ML DNX12

## (undated) DEVICE — VESSEL LOOPS RED MINI 31145710

## (undated) DEVICE — PREP SKIN SCRUB TRAY 4461A

## (undated) DEVICE — INTRO SHEATH BRITE TIP 5FRX11CM 401-511M

## (undated) DEVICE — SYR 03ML LL W/O NDL 309657

## (undated) DEVICE — KIT MICRO-INTRODUCER STIFFEN 4FR 7274V

## (undated) DEVICE — SYR 10ML LL W/O NDL

## (undated) DEVICE — SYR MEDRAD 150ML MARK 7 ARTERION ART700SYR

## (undated) DEVICE — SU SILK 4-0 TIE 12X30" A303H

## (undated) DEVICE — SU PROLENE 5-0 24" BV-1 9702H

## (undated) DEVICE — DRAPE SHEET REV FOLD 3/4 9349

## (undated) DEVICE — GUIDEWIRE TERUMO .035X260CM EXCHANGE ANG GS3509

## (undated) DEVICE — DRAPE STERI TOWEL LG 1010

## (undated) DEVICE — LINEN TOWEL PACK X6 WHITE 5487

## (undated) DEVICE — CLIP HORIZON MED BLUE 002200

## (undated) DEVICE — WIRE GUIDE 0.035"X260CM CEREBRAL BENTSON STR TSFB-35-260-BH

## (undated) DEVICE — INTRO TERUMO 7FRX25CM W/MARKER RSB703

## (undated) DEVICE — DRAPE IOBAN INCISE 23X17" 6650EZ

## (undated) DEVICE — DEVICE MULTI TORQUE TD01

## (undated) DEVICE — SOL NACL 0.9% INJ 1000ML BAG 2B1324X

## (undated) DEVICE — GUIDEWIRE TERUMO .035X260CM STIFF EXCHANGE GS3509

## (undated) DEVICE — SUCTION MANIFOLD DORNOCH ULTRA CART UL-CL500

## (undated) DEVICE — CATH ANGION PIGTAIL ACCU-VU 5FRX70CM SIZING 13709801

## (undated) DEVICE — BLANKET BAIR HUGGER UNDERBODY AU63500

## (undated) DEVICE — INTRO SHEATH BRITE TIP 7FRX11CM 401-711M

## (undated) DEVICE — CATH ANGIO MARINER KUMPE 5FRX65CM 11732701

## (undated) DEVICE — SU PROLENE 6-0 24" 8726H

## (undated) DEVICE — GUIDEWIRE ROSEN CVD .035X260CM G01253 THSCF-35-260-1.5-ROSEN

## (undated) DEVICE — SUTURE BOOTS 051003PBX

## (undated) DEVICE — COVER NEOPROBE SOFTFLEX 5X96" W/BANDS 20-PC596

## (undated) DEVICE — DRSG TEGADERM 2 3/8X2 3/4" 1624W

## (undated) DEVICE — VESSEL LOOPS YELLOW MAXI 31145694

## (undated) DEVICE — CATH BALLOON DILATION MUSTANG 5X40X75CM H74939171050470

## (undated) DEVICE — CATH EP SPECTRANETICS QUICK CROSS 4.8-3.7FRX150CM 518-038

## (undated) DEVICE — CLIP HORIZON SM RED WIDE SLOT 001201

## (undated) DEVICE — DRAPE C-ARM W/STRAPS 42X72" 07-CA104

## (undated) DEVICE — TUBING MEDRAD 48" HIGH PRESSURE MX694

## (undated) DEVICE — INTRO SHEATH BRITE TIP 4FRX11CM 401-411M

## (undated) RX ORDER — SODIUM CHLORIDE 9 MG/ML
INJECTION, SOLUTION INTRAVENOUS
Status: DISPENSED
Start: 2017-03-21

## (undated) RX ORDER — ADENOSINE 3 MG/ML
INJECTION, SOLUTION INTRAVENOUS
Status: DISPENSED
Start: 2017-03-21

## (undated) RX ORDER — BUPIVACAINE HYDROCHLORIDE AND EPINEPHRINE 2.5; 5 MG/ML; UG/ML
INJECTION, SOLUTION EPIDURAL; INFILTRATION; INTRACAUDAL; PERINEURAL
Status: DISPENSED
Start: 2017-04-18

## (undated) RX ORDER — FENTANYL CITRATE 50 UG/ML
INJECTION, SOLUTION INTRAMUSCULAR; INTRAVENOUS
Status: DISPENSED
Start: 2017-03-21

## (undated) RX ORDER — HYDROMORPHONE HYDROCHLORIDE 1 MG/ML
INJECTION, SOLUTION INTRAMUSCULAR; INTRAVENOUS; SUBCUTANEOUS
Status: DISPENSED
Start: 2017-04-18

## (undated) RX ORDER — CLOPIDOGREL BISULFATE 75 MG/1
TABLET ORAL
Status: DISPENSED
Start: 2017-04-18

## (undated) RX ORDER — CEFAZOLIN SODIUM 2 G/100ML
INJECTION, SOLUTION INTRAVENOUS
Status: DISPENSED
Start: 2017-04-18

## (undated) RX ORDER — NITROGLYCERIN 5 MG/ML
VIAL (ML) INTRAVENOUS
Status: DISPENSED
Start: 2017-03-21

## (undated) RX ORDER — HEPARIN SODIUM 1000 [USP'U]/ML
INJECTION, SOLUTION INTRAVENOUS; SUBCUTANEOUS
Status: DISPENSED
Start: 2017-04-18

## (undated) RX ORDER — VERAPAMIL HYDROCHLORIDE 2.5 MG/ML
INJECTION, SOLUTION INTRAVENOUS
Status: DISPENSED
Start: 2017-03-21

## (undated) RX ORDER — IOPAMIDOL 755 MG/ML
INJECTION, SOLUTION INTRAVASCULAR
Status: DISPENSED
Start: 2017-04-18

## (undated) RX ORDER — FENTANYL CITRATE 50 UG/ML
INJECTION, SOLUTION INTRAMUSCULAR; INTRAVENOUS
Status: DISPENSED
Start: 2017-04-18

## (undated) RX ORDER — HEPARIN SODIUM 1000 [USP'U]/ML
INJECTION, SOLUTION INTRAVENOUS; SUBCUTANEOUS
Status: DISPENSED
Start: 2017-03-21

## (undated) RX ORDER — LIDOCAINE HYDROCHLORIDE AND EPINEPHRINE 10; 10 MG/ML; UG/ML
INJECTION, SOLUTION INFILTRATION; PERINEURAL
Status: DISPENSED
Start: 2017-04-18